# Patient Record
Sex: FEMALE | Race: BLACK OR AFRICAN AMERICAN | NOT HISPANIC OR LATINO | URBAN - METROPOLITAN AREA
[De-identification: names, ages, dates, MRNs, and addresses within clinical notes are randomized per-mention and may not be internally consistent; named-entity substitution may affect disease eponyms.]

---

## 2017-04-14 ENCOUNTER — OUTPATIENT (OUTPATIENT)
Dept: OUTPATIENT SERVICES | Facility: HOSPITAL | Age: 47
LOS: 1 days | End: 2017-04-14
Payer: COMMERCIAL

## 2017-04-14 ENCOUNTER — APPOINTMENT (OUTPATIENT)
Dept: ULTRASOUND IMAGING | Facility: IMAGING CENTER | Age: 47
End: 2017-04-14

## 2017-04-14 ENCOUNTER — APPOINTMENT (OUTPATIENT)
Dept: MAMMOGRAPHY | Facility: IMAGING CENTER | Age: 47
End: 2017-04-14

## 2017-04-14 DIAGNOSIS — Z00.8 ENCOUNTER FOR OTHER GENERAL EXAMINATION: ICD-10-CM

## 2017-04-14 DIAGNOSIS — Z33.2 ENCOUNTER FOR ELECTIVE TERMINATION OF PREGNANCY: Chronic | ICD-10-CM

## 2017-04-14 DIAGNOSIS — O03.9 COMPLETE OR UNSPECIFIED SPONTANEOUS ABORTION WITHOUT COMPLICATION: Chronic | ICD-10-CM

## 2017-04-14 DIAGNOSIS — D25.9 LEIOMYOMA OF UTERUS, UNSPECIFIED: Chronic | ICD-10-CM

## 2017-04-14 PROCEDURE — 76856 US EXAM PELVIC COMPLETE: CPT

## 2017-04-14 PROCEDURE — 76830 TRANSVAGINAL US NON-OB: CPT

## 2017-04-14 PROCEDURE — 77063 BREAST TOMOSYNTHESIS BI: CPT

## 2017-04-14 PROCEDURE — 77067 SCR MAMMO BI INCL CAD: CPT

## 2018-06-02 ENCOUNTER — APPOINTMENT (OUTPATIENT)
Dept: MAMMOGRAPHY | Facility: IMAGING CENTER | Age: 48
End: 2018-06-02

## 2018-07-12 ENCOUNTER — APPOINTMENT (OUTPATIENT)
Dept: MAMMOGRAPHY | Facility: IMAGING CENTER | Age: 48
End: 2018-07-12

## 2019-02-20 PROBLEM — Z00.00 ENCOUNTER FOR PREVENTIVE HEALTH EXAMINATION: Noted: 2019-02-20

## 2019-02-21 ENCOUNTER — OUTPATIENT (OUTPATIENT)
Dept: OUTPATIENT SERVICES | Facility: HOSPITAL | Age: 49
LOS: 1 days | End: 2019-02-21
Payer: COMMERCIAL

## 2019-02-21 ENCOUNTER — APPOINTMENT (OUTPATIENT)
Dept: MAMMOGRAPHY | Facility: IMAGING CENTER | Age: 49
End: 2019-02-21
Payer: COMMERCIAL

## 2019-02-21 DIAGNOSIS — Z00.00 ENCOUNTER FOR GENERAL ADULT MEDICAL EXAMINATION WITHOUT ABNORMAL FINDINGS: ICD-10-CM

## 2019-02-21 DIAGNOSIS — Z33.2 ENCOUNTER FOR ELECTIVE TERMINATION OF PREGNANCY: Chronic | ICD-10-CM

## 2019-02-21 DIAGNOSIS — D25.9 LEIOMYOMA OF UTERUS, UNSPECIFIED: Chronic | ICD-10-CM

## 2019-02-21 DIAGNOSIS — O03.9 COMPLETE OR UNSPECIFIED SPONTANEOUS ABORTION WITHOUT COMPLICATION: Chronic | ICD-10-CM

## 2019-02-21 PROCEDURE — 77067 SCR MAMMO BI INCL CAD: CPT | Mod: 26

## 2019-02-21 PROCEDURE — 77067 SCR MAMMO BI INCL CAD: CPT

## 2019-02-21 PROCEDURE — 77063 BREAST TOMOSYNTHESIS BI: CPT

## 2019-02-21 PROCEDURE — 77063 BREAST TOMOSYNTHESIS BI: CPT | Mod: 26

## 2019-03-06 ENCOUNTER — APPOINTMENT (OUTPATIENT)
Dept: ULTRASOUND IMAGING | Facility: IMAGING CENTER | Age: 49
End: 2019-03-06
Payer: COMMERCIAL

## 2019-03-06 ENCOUNTER — OUTPATIENT (OUTPATIENT)
Dept: OUTPATIENT SERVICES | Facility: HOSPITAL | Age: 49
LOS: 1 days | End: 2019-03-06
Payer: COMMERCIAL

## 2019-03-06 DIAGNOSIS — O03.9 COMPLETE OR UNSPECIFIED SPONTANEOUS ABORTION WITHOUT COMPLICATION: Chronic | ICD-10-CM

## 2019-03-06 DIAGNOSIS — Z00.8 ENCOUNTER FOR OTHER GENERAL EXAMINATION: ICD-10-CM

## 2019-03-06 DIAGNOSIS — Z33.2 ENCOUNTER FOR ELECTIVE TERMINATION OF PREGNANCY: Chronic | ICD-10-CM

## 2019-03-06 DIAGNOSIS — D25.9 LEIOMYOMA OF UTERUS, UNSPECIFIED: Chronic | ICD-10-CM

## 2019-03-06 PROCEDURE — 76775 US EXAM ABDO BACK WALL LIM: CPT | Mod: 26

## 2019-03-06 PROCEDURE — 76775 US EXAM ABDO BACK WALL LIM: CPT

## 2019-03-13 ENCOUNTER — APPOINTMENT (OUTPATIENT)
Dept: DERMATOLOGY | Facility: CLINIC | Age: 49
End: 2019-03-13
Payer: COMMERCIAL

## 2019-03-13 VITALS
DIASTOLIC BLOOD PRESSURE: 100 MMHG | HEIGHT: 64 IN | WEIGHT: 209 LBS | BODY MASS INDEX: 35.68 KG/M2 | SYSTOLIC BLOOD PRESSURE: 170 MMHG

## 2019-03-13 PROCEDURE — 99201 OFFICE OUTPATIENT NEW 10 MINUTES: CPT

## 2019-03-13 RX ORDER — AMLODIPINE BESYLATE 10 MG/1
10 TABLET ORAL
Qty: 90 | Refills: 0 | Status: ACTIVE | COMMUNITY
Start: 2018-09-27

## 2019-03-19 ENCOUNTER — APPOINTMENT (OUTPATIENT)
Dept: DERMATOLOGY | Facility: CLINIC | Age: 49
End: 2019-03-19
Payer: COMMERCIAL

## 2019-03-19 VITALS — SYSTOLIC BLOOD PRESSURE: 150 MMHG | DIASTOLIC BLOOD PRESSURE: 90 MMHG

## 2019-03-19 PROCEDURE — 99213 OFFICE O/P EST LOW 20 MIN: CPT

## 2019-05-21 ENCOUNTER — APPOINTMENT (OUTPATIENT)
Dept: DERMATOLOGY | Facility: CLINIC | Age: 49
End: 2019-05-21
Payer: COMMERCIAL

## 2019-05-21 PROCEDURE — 99214 OFFICE O/P EST MOD 30 MIN: CPT

## 2019-05-23 ENCOUNTER — LABORATORY RESULT (OUTPATIENT)
Age: 49
End: 2019-05-23

## 2019-05-23 ENCOUNTER — APPOINTMENT (OUTPATIENT)
Dept: DERMATOLOGY | Facility: CLINIC | Age: 49
End: 2019-05-23
Payer: COMMERCIAL

## 2019-05-23 DIAGNOSIS — L65.9 NONSCARRING HAIR LOSS, UNSPECIFIED: ICD-10-CM

## 2019-05-23 PROCEDURE — 11105 PUNCH BX SKIN EA SEP/ADDL: CPT

## 2019-05-23 PROCEDURE — 11104 PUNCH BX SKIN SINGLE LESION: CPT | Mod: 59

## 2019-05-23 PROCEDURE — 99213 OFFICE O/P EST LOW 20 MIN: CPT | Mod: 25

## 2019-06-03 ENCOUNTER — APPOINTMENT (OUTPATIENT)
Dept: DERMATOLOGY | Facility: CLINIC | Age: 49
End: 2019-06-03
Payer: COMMERCIAL

## 2019-06-03 PROCEDURE — 99212 OFFICE O/P EST SF 10 MIN: CPT

## 2019-06-27 ENCOUNTER — MEDICATION RENEWAL (OUTPATIENT)
Age: 49
End: 2019-06-27

## 2019-07-01 ENCOUNTER — APPOINTMENT (OUTPATIENT)
Dept: DERMATOLOGY | Facility: CLINIC | Age: 49
End: 2019-07-01
Payer: COMMERCIAL

## 2019-07-01 PROCEDURE — 99214 OFFICE O/P EST MOD 30 MIN: CPT

## 2019-07-29 LAB
ACE BLD-CCNC: 64 U/L
ALBUMIN SERPL ELPH-MCNC: 4 G/DL
ALP BLD-CCNC: 102 U/L
ALT SERPL-CCNC: 32 U/L
ANA PAT FLD IF-IMP: ABNORMAL
ANA SER IF-ACNC: ABNORMAL
ANION GAP SERPL CALC-SCNC: 11 MMOL/L
AST SERPL-CCNC: 31 U/L
BASOPHILS # BLD AUTO: 0.02 K/UL
BASOPHILS NFR BLD AUTO: 0.4 %
BILIRUB SERPL-MCNC: 0.2 MG/DL
BUN SERPL-MCNC: 27 MG/DL
CALCIUM SERPL-MCNC: 9.1 MG/DL
CHLORIDE SERPL-SCNC: 102 MMOL/L
CO2 SERPL-SCNC: 28 MMOL/L
CREAT SERPL-MCNC: 1.95 MG/DL
EOSINOPHIL # BLD AUTO: 0.25 K/UL
EOSINOPHIL NFR BLD AUTO: 5.2 %
GLUCOSE SERPL-MCNC: 129 MG/DL
HCT VFR BLD CALC: 37 %
HGB BLD-MCNC: 10.9 G/DL
IMM GRANULOCYTES NFR BLD AUTO: 0.2 %
LYMPHOCYTES # BLD AUTO: 1.23 K/UL
LYMPHOCYTES NFR BLD AUTO: 25.6 %
MAN DIFF?: NORMAL
MCHC RBC-ENTMCNC: 23.5 PG
MCHC RBC-ENTMCNC: 29.5 GM/DL
MCV RBC AUTO: 79.9 FL
MONOCYTES # BLD AUTO: 0.84 K/UL
MONOCYTES NFR BLD AUTO: 17.5 %
NEUTROPHILS # BLD AUTO: 2.45 K/UL
NEUTROPHILS NFR BLD AUTO: 51.1 %
PLATELET # BLD AUTO: 334 K/UL
POTASSIUM SERPL-SCNC: 3.7 MMOL/L
PROT SERPL-MCNC: 6.8 G/DL
RBC # BLD: 4.63 M/UL
RBC # FLD: 15.3 %
SODIUM SERPL-SCNC: 141 MMOL/L
WBC # FLD AUTO: 4.8 K/UL

## 2019-12-20 ENCOUNTER — RESULT REVIEW (OUTPATIENT)
Age: 49
End: 2019-12-20

## 2020-01-31 ENCOUNTER — APPOINTMENT (OUTPATIENT)
Dept: DERMATOLOGY | Facility: CLINIC | Age: 50
End: 2020-01-31
Payer: COMMERCIAL

## 2020-01-31 DIAGNOSIS — L30.4 ERYTHEMA INTERTRIGO: ICD-10-CM

## 2020-01-31 PROCEDURE — 99213 OFFICE O/P EST LOW 20 MIN: CPT

## 2020-02-05 LAB
M TB IFN-G BLD-IMP: NEGATIVE
QUANTIFERON TB PLUS MITOGEN MINUS NIL: 4.17 IU/ML
QUANTIFERON TB PLUS NIL: 0.08 IU/ML
QUANTIFERON TB PLUS TB1 MINUS NIL: -0.01 IU/ML
QUANTIFERON TB PLUS TB2 MINUS NIL: -0.02 IU/ML

## 2020-02-11 ENCOUNTER — APPOINTMENT (OUTPATIENT)
Dept: ULTRASOUND IMAGING | Facility: IMAGING CENTER | Age: 50
End: 2020-02-11
Payer: COMMERCIAL

## 2020-02-11 ENCOUNTER — OUTPATIENT (OUTPATIENT)
Dept: OUTPATIENT SERVICES | Facility: HOSPITAL | Age: 50
LOS: 1 days | End: 2020-02-11
Payer: COMMERCIAL

## 2020-02-11 DIAGNOSIS — Z33.2 ENCOUNTER FOR ELECTIVE TERMINATION OF PREGNANCY: Chronic | ICD-10-CM

## 2020-02-11 DIAGNOSIS — O03.9 COMPLETE OR UNSPECIFIED SPONTANEOUS ABORTION WITHOUT COMPLICATION: Chronic | ICD-10-CM

## 2020-02-11 DIAGNOSIS — D25.9 LEIOMYOMA OF UTERUS, UNSPECIFIED: Chronic | ICD-10-CM

## 2020-02-11 DIAGNOSIS — Z00.8 ENCOUNTER FOR OTHER GENERAL EXAMINATION: ICD-10-CM

## 2020-02-11 PROCEDURE — 76856 US EXAM PELVIC COMPLETE: CPT | Mod: 26

## 2020-02-11 PROCEDURE — 76830 TRANSVAGINAL US NON-OB: CPT

## 2020-02-11 PROCEDURE — 76856 US EXAM PELVIC COMPLETE: CPT

## 2020-02-11 PROCEDURE — 76830 TRANSVAGINAL US NON-OB: CPT | Mod: 26

## 2020-02-18 ENCOUNTER — APPOINTMENT (OUTPATIENT)
Dept: ULTRASOUND IMAGING | Facility: IMAGING CENTER | Age: 50
End: 2020-02-18
Payer: COMMERCIAL

## 2020-02-18 ENCOUNTER — APPOINTMENT (OUTPATIENT)
Dept: RADIOLOGY | Facility: IMAGING CENTER | Age: 50
End: 2020-02-18
Payer: COMMERCIAL

## 2020-02-18 ENCOUNTER — OUTPATIENT (OUTPATIENT)
Dept: OUTPATIENT SERVICES | Facility: HOSPITAL | Age: 50
LOS: 1 days | End: 2020-02-18
Payer: COMMERCIAL

## 2020-02-18 DIAGNOSIS — Z00.8 ENCOUNTER FOR OTHER GENERAL EXAMINATION: ICD-10-CM

## 2020-02-18 DIAGNOSIS — Z33.2 ENCOUNTER FOR ELECTIVE TERMINATION OF PREGNANCY: Chronic | ICD-10-CM

## 2020-02-18 DIAGNOSIS — D25.9 LEIOMYOMA OF UTERUS, UNSPECIFIED: Chronic | ICD-10-CM

## 2020-02-18 DIAGNOSIS — O03.9 COMPLETE OR UNSPECIFIED SPONTANEOUS ABORTION WITHOUT COMPLICATION: Chronic | ICD-10-CM

## 2020-02-18 PROCEDURE — 73564 X-RAY EXAM KNEE 4 OR MORE: CPT | Mod: 26,50

## 2020-02-18 PROCEDURE — 93970 EXTREMITY STUDY: CPT

## 2020-02-18 PROCEDURE — 73564 X-RAY EXAM KNEE 4 OR MORE: CPT

## 2020-02-18 PROCEDURE — 93970 EXTREMITY STUDY: CPT | Mod: 26

## 2020-02-24 ENCOUNTER — APPOINTMENT (OUTPATIENT)
Dept: ULTRASOUND IMAGING | Facility: IMAGING CENTER | Age: 50
End: 2020-02-24
Payer: COMMERCIAL

## 2020-02-24 ENCOUNTER — APPOINTMENT (OUTPATIENT)
Dept: MAMMOGRAPHY | Facility: IMAGING CENTER | Age: 50
End: 2020-02-24
Payer: COMMERCIAL

## 2020-02-24 ENCOUNTER — OUTPATIENT (OUTPATIENT)
Dept: OUTPATIENT SERVICES | Facility: HOSPITAL | Age: 50
LOS: 1 days | End: 2020-02-24
Payer: COMMERCIAL

## 2020-02-24 DIAGNOSIS — O03.9 COMPLETE OR UNSPECIFIED SPONTANEOUS ABORTION WITHOUT COMPLICATION: Chronic | ICD-10-CM

## 2020-02-24 DIAGNOSIS — Z33.2 ENCOUNTER FOR ELECTIVE TERMINATION OF PREGNANCY: Chronic | ICD-10-CM

## 2020-02-24 DIAGNOSIS — D25.9 LEIOMYOMA OF UTERUS, UNSPECIFIED: Chronic | ICD-10-CM

## 2020-02-24 DIAGNOSIS — Z00.8 ENCOUNTER FOR OTHER GENERAL EXAMINATION: ICD-10-CM

## 2020-02-24 PROCEDURE — 77063 BREAST TOMOSYNTHESIS BI: CPT

## 2020-02-24 PROCEDURE — 77067 SCR MAMMO BI INCL CAD: CPT | Mod: 26

## 2020-02-24 PROCEDURE — 77063 BREAST TOMOSYNTHESIS BI: CPT | Mod: 26

## 2020-02-24 PROCEDURE — 93975 VASCULAR STUDY: CPT

## 2020-02-24 PROCEDURE — 93975 VASCULAR STUDY: CPT | Mod: 26

## 2020-02-24 PROCEDURE — 77067 SCR MAMMO BI INCL CAD: CPT

## 2020-03-11 ENCOUNTER — NON-APPOINTMENT (OUTPATIENT)
Age: 50
End: 2020-03-11

## 2020-03-11 ENCOUNTER — APPOINTMENT (OUTPATIENT)
Dept: CARDIOLOGY | Facility: CLINIC | Age: 50
End: 2020-03-11
Payer: COMMERCIAL

## 2020-03-11 VITALS — DIASTOLIC BLOOD PRESSURE: 90 MMHG | SYSTOLIC BLOOD PRESSURE: 160 MMHG

## 2020-03-11 VITALS
BODY MASS INDEX: 36.54 KG/M2 | WEIGHT: 214 LBS | SYSTOLIC BLOOD PRESSURE: 169 MMHG | DIASTOLIC BLOOD PRESSURE: 95 MMHG | HEART RATE: 91 BPM | HEIGHT: 64 IN | OXYGEN SATURATION: 97 %

## 2020-03-11 DIAGNOSIS — R79.89 OTHER SPECIFIED ABNORMAL FINDINGS OF BLOOD CHEMISTRY: ICD-10-CM

## 2020-03-11 DIAGNOSIS — Z87.59 PERSONAL HISTORY OF OTHER COMPLICATIONS OF PREGNANCY, CHILDBIRTH AND THE PUERPERIUM: ICD-10-CM

## 2020-03-11 DIAGNOSIS — Z82.49 FAMILY HISTORY OF ISCHEMIC HEART DISEASE AND OTHER DISEASES OF THE CIRCULATORY SYSTEM: ICD-10-CM

## 2020-03-11 PROCEDURE — 99205 OFFICE O/P NEW HI 60 MIN: CPT

## 2020-03-11 PROCEDURE — 93000 ELECTROCARDIOGRAM COMPLETE: CPT

## 2020-03-11 RX ORDER — CLOBETASOL PROPIONATE 0.5 MG/G
0.05 CREAM TOPICAL
Qty: 30 | Refills: 0 | Status: DISCONTINUED | COMMUNITY
Start: 2019-01-04 | End: 2020-03-11

## 2020-03-11 RX ORDER — FOLIC ACID 1 MG/1
1 TABLET ORAL DAILY
Qty: 90 | Refills: 2 | Status: ACTIVE | COMMUNITY
Start: 2020-03-11

## 2020-03-11 RX ORDER — DOXYCYCLINE 100 MG/1
100 CAPSULE ORAL
Qty: 20 | Refills: 0 | Status: DISCONTINUED | COMMUNITY
Start: 2019-03-13 | End: 2020-03-11

## 2020-03-11 RX ORDER — HALOBETASOL PROPIONATE 0.5 MG/G
0.05 OINTMENT TOPICAL
Qty: 1 | Refills: 0 | Status: DISCONTINUED | COMMUNITY
Start: 2019-05-21 | End: 2020-03-11

## 2020-03-11 RX ORDER — DOXYCYCLINE 100 MG/1
100 CAPSULE ORAL TWICE DAILY
Qty: 60 | Refills: 3 | Status: DISCONTINUED | COMMUNITY
Start: 2019-07-01 | End: 2020-03-11

## 2020-03-11 RX ORDER — ASPIRIN 81 MG/1
81 TABLET ORAL DAILY
Qty: 90 | Refills: 3 | Status: ACTIVE | COMMUNITY
Start: 2020-03-11

## 2020-03-11 RX ORDER — CLOTRIMAZOLE AND BETAMETHASONE DIPROPIONATE 10; .5 MG/G; MG/G
1-0.05 CREAM TOPICAL
Qty: 30 | Refills: 0 | Status: DISCONTINUED | COMMUNITY
Start: 2018-09-27 | End: 2020-03-11

## 2020-03-11 RX ORDER — HYDROCORTISONE 25 MG/G
2.5 CREAM TOPICAL
Qty: 1 | Refills: 1 | Status: DISCONTINUED | COMMUNITY
Start: 2019-05-21 | End: 2020-03-11

## 2020-03-11 RX ORDER — LIDOCAINE HYDROCHLORIDE 20 MG/ML
2 JELLY TOPICAL
Qty: 30 | Refills: 0 | Status: DISCONTINUED | COMMUNITY
Start: 2018-09-29 | End: 2020-03-11

## 2020-03-11 RX ORDER — TRETINOIN 0.25 MG/G
0.03 CREAM TOPICAL
Qty: 1 | Refills: 2 | Status: DISCONTINUED | COMMUNITY
Start: 2019-07-02 | End: 2020-03-11

## 2020-03-11 RX ORDER — KETOCONAZOLE 20 MG/G
2 CREAM TOPICAL
Qty: 1 | Refills: 0 | Status: DISCONTINUED | COMMUNITY
Start: 2019-05-21 | End: 2020-03-11

## 2020-03-11 RX ORDER — LOSARTAN POTASSIUM AND HYDROCHLOROTHIAZIDE 12.5; 5 MG/1; MG/1
50-12.5 TABLET ORAL
Qty: 30 | Refills: 0 | Status: DISCONTINUED | COMMUNITY
Start: 2018-10-08 | End: 2020-03-11

## 2020-03-11 RX ORDER — TACROLIMUS 1 MG/G
0.1 OINTMENT TOPICAL TWICE DAILY
Qty: 1 | Refills: 1 | Status: DISCONTINUED | COMMUNITY
Start: 2020-01-31 | End: 2020-03-11

## 2020-03-29 NOTE — PHYSICAL EXAM
[General Appearance - Well Developed] : well developed [Normal Appearance] : normal appearance [Well Groomed] : well groomed [General Appearance - Well Nourished] : well nourished [No Deformities] : no deformities [General Appearance - In No Acute Distress] : no acute distress [Normal Conjunctiva] : the conjunctiva exhibited no abnormalities [Eyelids - No Xanthelasma] : the eyelids demonstrated no xanthelasmas [Normal Oral Mucosa] : normal oral mucosa [No Oral Pallor] : no oral pallor [No Oral Cyanosis] : no oral cyanosis [Normal Oropharynx] : normal oropharynx [Normal Jugular Venous A Waves Present] : normal jugular venous A waves present [Normal Jugular Venous V Waves Present] : normal jugular venous V waves present [No Jugular Venous Gaines A Waves] : no jugular venous gaines A waves [Heart Rate And Rhythm] : heart rate and rhythm were normal [Heart Sounds] : normal S1 and S2 [Murmurs] : no murmurs present [Arterial Pulses Normal] : the arterial pulses were normal [Edema] : no peripheral edema present [Veins - Varicosity Changes] : no varicosital changes were noted in the lower extremities [] : no respiratory distress [Respiration, Rhythm And Depth] : normal respiratory rhythm and effort [Exaggerated Use Of Accessory Muscles For Inspiration] : no accessory muscle use [Auscultation Breath Sounds / Voice Sounds] : lungs were clear to auscultation bilaterally [Bowel Sounds] : normal bowel sounds [Abdomen Soft] : soft [Abdomen Tenderness] : non-tender [Abnormal Walk] : normal gait [Gait - Sufficient For Exercise Testing] : the gait was sufficient for exercise testing [Nail Clubbing] : no clubbing of the fingernails [Cyanosis, Localized] : no localized cyanosis [Skin Color & Pigmentation] : normal skin color and pigmentation [No Venous Stasis] : no venous stasis [No Xanthoma] : no  xanthoma was observed [Oriented To Time, Place, And Person] : oriented to person, place, and time [Impaired Insight] : insight and judgment were intact [Affect] : the affect was normal [Mood] : the mood was normal [No Anxiety] : not feeling anxious

## 2020-03-29 NOTE — DISCUSSION/SUMMARY
[FreeTextEntry1] : Patient is a 49 year-old woman with recent CVA in the setting of hypertensive crisis. Blood pressure remains elevated at today's visit. Will titrate up antihypertensive regimen. \par Currently at maximally tolerated doses of losartan, amlodipine, HCTZ, and doxazosin. Will increase labetalol.\par Diet and exercise with goal of adding lean muscle and reducing fat to address insulin resistant state. Repeat A1c in 3 months.\par Weight loss will also likely improve blood pressure control.\par \par Plan for repeat MRI with Dr. Bro tomorrow.\par Patient will attempt to obtain echocardiogram results from [facility].

## 2020-03-29 NOTE — HISTORY OF PRESENT ILLNESS
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 She reports that this resolved with medications but without intervention.\par \par Dermatologic biopsy with concern for sarcoidosis in 2020. No work-up has been done to evaluate heart or lungs for sarcoidosis, but patient has no history of syncope, palpitations, or conduction disease on ECG.\par \par PMD: Boaz Galvez MD [phone]\par Dermatologist: \par Gyn: Crys Fiore MD [phone]\par Hematologist: Socorro Trujillo MD [phone]

## 2020-03-29 NOTE — REASON FOR VISIT
[Initial Evaluation] : an initial evaluation of [Hypertension] : hypertension [FreeTextEntry1] : Patient referred here by her neurologist, Silvana Bro MD.

## 2020-07-27 ENCOUNTER — APPOINTMENT (OUTPATIENT)
Dept: CARDIOLOGY | Facility: CLINIC | Age: 50
End: 2020-07-27
Payer: COMMERCIAL

## 2020-07-27 ENCOUNTER — NON-APPOINTMENT (OUTPATIENT)
Age: 50
End: 2020-07-27

## 2020-07-27 VITALS
DIASTOLIC BLOOD PRESSURE: 86 MMHG | WEIGHT: 198 LBS | TEMPERATURE: 97.4 F | HEIGHT: 60 IN | OXYGEN SATURATION: 100 % | BODY MASS INDEX: 38.87 KG/M2 | SYSTOLIC BLOOD PRESSURE: 136 MMHG | RESPIRATION RATE: 17 BRPM | HEART RATE: 78 BPM

## 2020-07-27 PROCEDURE — 99215 OFFICE O/P EST HI 40 MIN: CPT

## 2020-07-27 PROCEDURE — 93000 ELECTROCARDIOGRAM COMPLETE: CPT

## 2020-07-27 NOTE — HISTORY OF PRESENT ILLNESS
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 She reports that this resolved with medications but without intervention.\par \par Dermatologic biopsy with concern for sarcoidosis in 2020. No work-up has been done to evaluate heart or lungs for sarcoidosis, but patient has no history of syncope, palpitations, or conduction disease on ECG.\par \par PMD: Boaz Galvez MD [phone]\par Dermatologist: \par Gyn: Crys Fiore MD [phone]\par Hematologist: Socorro Trujillo MD [phone]\par Nephrologist: Khai Alaniz MD [phone]

## 2020-07-27 NOTE — REASON FOR VISIT
[Initial Evaluation] : an initial evaluation of [Hypertension] : hypertension [FreeTextEntry1] : Patient referred here by her neurologist, Silvana Bro MD.\par \par July 2020 - On July 15, 2020, patient was at work and noted right arm and right leg weakness. She dragged herself to the ER. Stroke protocol was called. By the time of the CT scan, her symptoms had entirely resolved. She was discharged the same day.

## 2020-07-27 NOTE — DISCUSSION/SUMMARY
[FreeTextEntry1] : Patient is a 49 year-old woman with recent CVA in the setting of hypertensive crisis. Blood pressure is better controlled at today's visit. Will continue antihypertensive regimen. \par Currently at maximally tolerated doses of losartan, amlodipine, HCTZ, and doxazosin. \par Will continue labetalol at 400 mg BID.\par \par Diet and exercise with goal of adding lean muscle and reducing fat to address insulin resistant state. Repeat A1c in 3 months.\par Weight loss will also likely improve blood pressure control.\par \par In light of recent cryptogenic CVA, will check RITESH (patient provided with number to call [facility], per her preference), Holter, and MCOT to evaluate for PAF.

## 2020-07-27 NOTE — PHYSICAL EXAM
[General Appearance - Well Developed] : well developed [Normal Appearance] : normal appearance [Well Groomed] : well groomed [General Appearance - Well Nourished] : well nourished [No Deformities] : no deformities [General Appearance - In No Acute Distress] : no acute distress [Normal Oral Mucosa] : normal oral mucosa [Normal Conjunctiva] : the conjunctiva exhibited no abnormalities [Eyelids - No Xanthelasma] : the eyelids demonstrated no xanthelasmas [No Oral Cyanosis] : no oral cyanosis [No Oral Pallor] : no oral pallor [Normal Oropharynx] : normal oropharynx [Normal Jugular Venous A Waves Present] : normal jugular venous A waves present [Normal Jugular Venous V Waves Present] : normal jugular venous V waves present [No Jugular Venous Gaines A Waves] : no jugular venous gaines A waves [] : no respiratory distress [Exaggerated Use Of Accessory Muscles For Inspiration] : no accessory muscle use [Respiration, Rhythm And Depth] : normal respiratory rhythm and effort [Auscultation Breath Sounds / Voice Sounds] : lungs were clear to auscultation bilaterally [Heart Sounds] : normal S1 and S2 [Murmurs] : no murmurs present [Heart Rate And Rhythm] : heart rate and rhythm were normal [Arterial Pulses Normal] : the arterial pulses were normal [Edema] : no peripheral edema present [Abdomen Soft] : soft [Veins - Varicosity Changes] : no varicosital changes were noted in the lower extremities [Bowel Sounds] : normal bowel sounds [Gait - Sufficient For Exercise Testing] : the gait was sufficient for exercise testing [Abnormal Walk] : normal gait [Abdomen Tenderness] : non-tender [Cyanosis, Localized] : no localized cyanosis [Nail Clubbing] : no clubbing of the fingernails [Skin Color & Pigmentation] : normal skin color and pigmentation [No Venous Stasis] : no venous stasis [No Xanthoma] : no  xanthoma was observed [Oriented To Time, Place, And Person] : oriented to person, place, and time [Impaired Insight] : insight and judgment were intact [Mood] : the mood was normal [Affect] : the affect was normal [No Anxiety] : not feeling anxious

## 2020-07-29 LAB
25(OH)D3 SERPL-MCNC: 10.3 NG/ML
ALBUMIN SERPL ELPH-MCNC: 4.3 G/DL
ALP BLD-CCNC: 187 U/L
ALT SERPL-CCNC: 36 U/L
ANION GAP SERPL CALC-SCNC: 16 MMOL/L
AST SERPL-CCNC: 37 U/L
BASOPHILS # BLD AUTO: 0.02 K/UL
BASOPHILS NFR BLD AUTO: 0.3 %
BILIRUB SERPL-MCNC: 0.2 MG/DL
BUN SERPL-MCNC: 44 MG/DL
CALCIUM SERPL-MCNC: 10.5 MG/DL
CHLORIDE SERPL-SCNC: 99 MMOL/L
CHOLEST SERPL-MCNC: 143 MG/DL
CHOLEST/HDLC SERPL: 2.9 RATIO
CO2 SERPL-SCNC: 25 MMOL/L
CREAT SERPL-MCNC: 3.31 MG/DL
EOSINOPHIL # BLD AUTO: 0.29 K/UL
EOSINOPHIL NFR BLD AUTO: 4.9 %
ESTIMATED AVERAGE GLUCOSE: 235 MG/DL
GLUCOSE SERPL-MCNC: 122 MG/DL
HBA1C MFR BLD HPLC: 9.8 %
HCT VFR BLD CALC: 36.6 %
HDLC SERPL-MCNC: 49 MG/DL
HGB BLD-MCNC: 11.1 G/DL
IMM GRANULOCYTES NFR BLD AUTO: 0.3 %
LDLC SERPL CALC-MCNC: 68 MG/DL
LYMPHOCYTES # BLD AUTO: 1.25 K/UL
LYMPHOCYTES NFR BLD AUTO: 21.1 %
MAN DIFF?: NORMAL
MCHC RBC-ENTMCNC: 28 PG
MCHC RBC-ENTMCNC: 30.3 GM/DL
MCV RBC AUTO: 92.4 FL
MONOCYTES # BLD AUTO: 0.78 K/UL
MONOCYTES NFR BLD AUTO: 13.2 %
NEUTROPHILS # BLD AUTO: 3.56 K/UL
NEUTROPHILS NFR BLD AUTO: 60.2 %
PLATELET # BLD AUTO: 339 K/UL
POTASSIUM SERPL-SCNC: 4.1 MMOL/L
PROT SERPL-MCNC: 7.2 G/DL
RBC # BLD: 3.96 M/UL
RBC # FLD: 12.9 %
SARS-COV-2 IGG SERPL IA-ACNC: 0.72 INDEX
SARS-COV-2 IGG SERPL QL IA: NEGATIVE
SODIUM SERPL-SCNC: 139 MMOL/L
TRIGL SERPL-MCNC: 131 MG/DL
TSH SERPL-ACNC: 1.5 UIU/ML
WBC # FLD AUTO: 5.92 K/UL

## 2020-08-03 ENCOUNTER — NON-APPOINTMENT (OUTPATIENT)
Age: 50
End: 2020-08-03

## 2020-08-12 ENCOUNTER — APPOINTMENT (OUTPATIENT)
Dept: CARDIOLOGY | Facility: CLINIC | Age: 50
End: 2020-08-12

## 2020-08-13 ENCOUNTER — NON-APPOINTMENT (OUTPATIENT)
Age: 50
End: 2020-08-13

## 2020-11-19 ENCOUNTER — NON-APPOINTMENT (OUTPATIENT)
Age: 50
End: 2020-11-19

## 2020-11-19 ENCOUNTER — APPOINTMENT (OUTPATIENT)
Dept: CARDIOLOGY | Facility: CLINIC | Age: 50
End: 2020-11-19
Payer: COMMERCIAL

## 2020-11-19 VITALS — DIASTOLIC BLOOD PRESSURE: 80 MMHG | SYSTOLIC BLOOD PRESSURE: 140 MMHG

## 2020-11-19 VITALS
WEIGHT: 194 LBS | SYSTOLIC BLOOD PRESSURE: 152 MMHG | OXYGEN SATURATION: 100 % | TEMPERATURE: 97.3 F | BODY MASS INDEX: 37.89 KG/M2 | HEART RATE: 85 BPM | DIASTOLIC BLOOD PRESSURE: 88 MMHG

## 2020-11-19 PROCEDURE — 99072 ADDL SUPL MATRL&STAF TM PHE: CPT

## 2020-11-19 PROCEDURE — 93000 ELECTROCARDIOGRAM COMPLETE: CPT

## 2020-11-19 PROCEDURE — 99215 OFFICE O/P EST HI 40 MIN: CPT

## 2020-11-19 NOTE — PHYSICAL EXAM
[General Appearance - Well Developed] : well developed [Normal Appearance] : normal appearance [Well Groomed] : well groomed [General Appearance - Well Nourished] : well nourished [No Deformities] : no deformities [General Appearance - In No Acute Distress] : no acute distress [Normal Conjunctiva] : the conjunctiva exhibited no abnormalities [Eyelids - No Xanthelasma] : the eyelids demonstrated no xanthelasmas [Normal Oral Mucosa] : normal oral mucosa [No Oral Pallor] : no oral pallor [No Oral Cyanosis] : no oral cyanosis [Normal Oropharynx] : normal oropharynx [Normal Jugular Venous A Waves Present] : normal jugular venous A waves present [Normal Jugular Venous V Waves Present] : normal jugular venous V waves present [No Jugular Venous Gaines A Waves] : no jugular venous gaines A waves [] : no respiratory distress [Respiration, Rhythm And Depth] : normal respiratory rhythm and effort [Exaggerated Use Of Accessory Muscles For Inspiration] : no accessory muscle use [Auscultation Breath Sounds / Voice Sounds] : lungs were clear to auscultation bilaterally [Heart Rate And Rhythm] : heart rate and rhythm were normal [Heart Sounds] : normal S1 and S2 [Murmurs] : no murmurs present [Arterial Pulses Normal] : the arterial pulses were normal [Edema] : no peripheral edema present [Veins - Varicosity Changes] : no varicosital changes were noted in the lower extremities [Bowel Sounds] : normal bowel sounds [Abdomen Soft] : soft [Abdomen Tenderness] : non-tender [Abnormal Walk] : normal gait [Gait - Sufficient For Exercise Testing] : the gait was sufficient for exercise testing [Nail Clubbing] : no clubbing of the fingernails [Cyanosis, Localized] : no localized cyanosis [Skin Color & Pigmentation] : normal skin color and pigmentation [No Venous Stasis] : no venous stasis [No Xanthoma] : no  xanthoma was observed [Oriented To Time, Place, And Person] : oriented to person, place, and time [Impaired Insight] : insight and judgment were intact [Affect] : the affect was normal [Mood] : the mood was normal [No Anxiety] : not feeling anxious

## 2020-11-20 LAB
25(OH)D3 SERPL-MCNC: 35.9 NG/ML
ALBUMIN SERPL ELPH-MCNC: 4.2 G/DL
ALP BLD-CCNC: 159 U/L
ALT SERPL-CCNC: 23 U/L
ANION GAP SERPL CALC-SCNC: 11 MMOL/L
AST SERPL-CCNC: 25 U/L
BASOPHILS # BLD AUTO: 0.03 K/UL
BASOPHILS NFR BLD AUTO: 0.4 %
BILIRUB SERPL-MCNC: <0.2 MG/DL
BUN SERPL-MCNC: 46 MG/DL
CALCIUM SERPL-MCNC: 11 MG/DL
CHLORIDE SERPL-SCNC: 99 MMOL/L
CHOLEST SERPL-MCNC: 109 MG/DL
CO2 SERPL-SCNC: 28 MMOL/L
CREAT SERPL-MCNC: 3.73 MG/DL
EOSINOPHIL # BLD AUTO: 0.42 K/UL
EOSINOPHIL NFR BLD AUTO: 6.1 %
ESTIMATED AVERAGE GLUCOSE: 151 MG/DL
GLUCOSE SERPL-MCNC: 118 MG/DL
HBA1C MFR BLD HPLC: 6.9 %
HCT VFR BLD CALC: 35.3 %
HDLC SERPL-MCNC: 45 MG/DL
HGB BLD-MCNC: 10.6 G/DL
IMM GRANULOCYTES NFR BLD AUTO: 0.3 %
LDLC SERPL CALC-MCNC: 32 MG/DL
LDLC SERPL DIRECT ASSAY-MCNC: 41 MG/DL
LYMPHOCYTES # BLD AUTO: 1.45 K/UL
LYMPHOCYTES NFR BLD AUTO: 21.1 %
MAN DIFF?: NORMAL
MCHC RBC-ENTMCNC: 26.6 PG
MCHC RBC-ENTMCNC: 30 GM/DL
MCV RBC AUTO: 88.5 FL
MONOCYTES # BLD AUTO: 0.98 K/UL
MONOCYTES NFR BLD AUTO: 14.2 %
NEUTROPHILS # BLD AUTO: 3.98 K/UL
NEUTROPHILS NFR BLD AUTO: 57.9 %
NONHDLC SERPL-MCNC: 65 MG/DL
PLATELET # BLD AUTO: 292 K/UL
POTASSIUM SERPL-SCNC: 4 MMOL/L
PROT SERPL-MCNC: 7 G/DL
RBC # BLD: 3.99 M/UL
RBC # FLD: 14.1 %
SARS-COV-2 IGG SERPL IA-ACNC: 0.09 INDEX
SARS-COV-2 IGG SERPL QL IA: NEGATIVE
SODIUM SERPL-SCNC: 137 MMOL/L
TRIGL SERPL-MCNC: 165 MG/DL
TSH SERPL-ACNC: 2.03 UIU/ML
WBC # FLD AUTO: 6.88 K/UL

## 2020-12-01 NOTE — DISCUSSION/SUMMARY
[FreeTextEntry1] : Patient is a 50 year-old woman with recent CVA in the setting of hypertensive crisis. Blood pressure is better controlled at today's visit. Will continue antihypertensive regimen. \par Currently at maximally tolerated doses of losartan, amlodipine, HCTZ, and doxazosin. \par Will continue labetalol at 400 mg BID.\par Creatinine has continued to rise - followed by nephrology.\par \par Diet and exercise with goal of adding lean muscle and reducing fat to address insulin resistant state. Repeat A1c in 3 months.\par Weight loss will also likely improve blood pressure control.\par \par Holter and 6 week monitor were negative for paroxysmal atrial fibrillation. In light of recent cryptogenic CVA, patient encouraged to have RITESH (patient provided with number to call to schedule at [facility], per her preference) to evaluate for PFO and cardiac source of emboli.

## 2020-12-01 NOTE — HISTORY OF PRESENT ILLNESS
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 She reports that this resolved with medications but without intervention.\par \par Dermatologic biopsy with concern for sarcoidosis in 2020. No work-up has been done to evaluate heart or lungs for sarcoidosis, but patient has no history of syncope, palpitations, or conduction disease on ECG.\par \par 2020 - On July 15, 2020, patient was at work and noted right arm and right leg weakness. She dragged herself to the ER. Stroke protocol was called. By the time of the CT scan, her symptoms had entirely resolved. She was discharged the same day. \par \par PMD: Boaz Galvez MD [phone]\par Dermatologist: \par Gyn: Crys Fiore MD [phone]\par Hematologist: Socorro Trujillo MD [phone]\par Nephrologist: Khai Alaniz MD [phone]

## 2020-12-01 NOTE — REASON FOR VISIT
[Follow-Up - Clinic] : a clinic follow-up of [Hypertension] : hypertension [FreeTextEntry1] : Patient referred here by her neurologist, Silvana Bro MD.\par \par November 2020 - Patient returns today in her usual state of health. She has not had recurrence of neurologic symptoms. She has also not called [facility] to schedule her RITESH because she is nervous about the procedure. In the interim since her last visit, her creatinine has risen and she was told to schedule a renal biopsy. She has not yet scheduled this procedure either.\par

## 2020-12-16 ENCOUNTER — APPOINTMENT (OUTPATIENT)
Dept: CARDIOLOGY | Facility: CLINIC | Age: 50
End: 2020-12-16

## 2021-02-17 ENCOUNTER — RESULT REVIEW (OUTPATIENT)
Age: 51
End: 2021-02-17

## 2021-03-08 ENCOUNTER — APPOINTMENT (OUTPATIENT)
Dept: ULTRASOUND IMAGING | Facility: IMAGING CENTER | Age: 51
End: 2021-03-08

## 2021-03-31 ENCOUNTER — OUTPATIENT (OUTPATIENT)
Dept: OUTPATIENT SERVICES | Facility: HOSPITAL | Age: 51
LOS: 1 days | End: 2021-03-31
Payer: COMMERCIAL

## 2021-03-31 ENCOUNTER — APPOINTMENT (OUTPATIENT)
Dept: MAMMOGRAPHY | Facility: IMAGING CENTER | Age: 51
End: 2021-03-31
Payer: COMMERCIAL

## 2021-03-31 DIAGNOSIS — D25.9 LEIOMYOMA OF UTERUS, UNSPECIFIED: Chronic | ICD-10-CM

## 2021-03-31 DIAGNOSIS — O03.9 COMPLETE OR UNSPECIFIED SPONTANEOUS ABORTION WITHOUT COMPLICATION: Chronic | ICD-10-CM

## 2021-03-31 DIAGNOSIS — Z33.2 ENCOUNTER FOR ELECTIVE TERMINATION OF PREGNANCY: Chronic | ICD-10-CM

## 2021-03-31 DIAGNOSIS — Z00.8 ENCOUNTER FOR OTHER GENERAL EXAMINATION: ICD-10-CM

## 2021-03-31 PROCEDURE — 77067 SCR MAMMO BI INCL CAD: CPT

## 2021-03-31 PROCEDURE — 77063 BREAST TOMOSYNTHESIS BI: CPT

## 2021-03-31 PROCEDURE — 77063 BREAST TOMOSYNTHESIS BI: CPT | Mod: 26

## 2021-03-31 PROCEDURE — 77067 SCR MAMMO BI INCL CAD: CPT | Mod: 26

## 2021-05-07 ENCOUNTER — EMERGENCY (EMERGENCY)
Facility: HOSPITAL | Age: 51
LOS: 1 days | Discharge: HOME | End: 2021-05-07
Attending: EMERGENCY MEDICINE | Admitting: EMERGENCY MEDICINE
Payer: COMMERCIAL

## 2021-05-07 VITALS
RESPIRATION RATE: 16 BRPM | TEMPERATURE: 98 F | HEIGHT: 64 IN | HEART RATE: 92 BPM | SYSTOLIC BLOOD PRESSURE: 163 MMHG | DIASTOLIC BLOOD PRESSURE: 85 MMHG | OXYGEN SATURATION: 100 %

## 2021-05-07 DIAGNOSIS — D25.9 LEIOMYOMA OF UTERUS, UNSPECIFIED: Chronic | ICD-10-CM

## 2021-05-07 DIAGNOSIS — Z33.2 ENCOUNTER FOR ELECTIVE TERMINATION OF PREGNANCY: Chronic | ICD-10-CM

## 2021-05-07 DIAGNOSIS — O03.9 COMPLETE OR UNSPECIFIED SPONTANEOUS ABORTION WITHOUT COMPLICATION: Chronic | ICD-10-CM

## 2021-05-07 PROCEDURE — 99285 EMERGENCY DEPT VISIT HI MDM: CPT

## 2021-05-07 NOTE — ED ADULT TRIAGE NOTE - CHIEF COMPLAINT QUOTE
c/o vaginal bleeding x 2 weeks. passing large clots (larger than fist). has been having generalized weakness. denies any pain. denies dizziness, sob. takes daily aspirin. hx fibroids, stroke 1.5 years ago, HTN.

## 2021-05-08 VITALS — HEIGHT: 64 IN | WEIGHT: 197.98 LBS

## 2021-05-08 LAB
ALBUMIN SERPL ELPH-MCNC: 3.8 G/DL — SIGNIFICANT CHANGE UP (ref 3.3–5)
ALP SERPL-CCNC: 138 U/L — HIGH (ref 40–120)
ALT FLD-CCNC: 14 U/L — SIGNIFICANT CHANGE UP (ref 4–33)
ANION GAP SERPL CALC-SCNC: 11 MMOL/L — SIGNIFICANT CHANGE UP (ref 7–14)
AST SERPL-CCNC: 15 U/L — SIGNIFICANT CHANGE UP (ref 4–32)
BASOPHILS # BLD AUTO: 0.02 K/UL — SIGNIFICANT CHANGE UP (ref 0–0.2)
BASOPHILS NFR BLD AUTO: 0.2 % — SIGNIFICANT CHANGE UP (ref 0–2)
BILIRUB SERPL-MCNC: <0.2 MG/DL — SIGNIFICANT CHANGE UP (ref 0.2–1.2)
BUN SERPL-MCNC: 26 MG/DL — HIGH (ref 7–23)
CALCIUM SERPL-MCNC: 10.1 MG/DL — SIGNIFICANT CHANGE UP (ref 8.4–10.5)
CHLORIDE SERPL-SCNC: 100 MMOL/L — SIGNIFICANT CHANGE UP (ref 98–107)
CO2 SERPL-SCNC: 22 MMOL/L — SIGNIFICANT CHANGE UP (ref 22–31)
CREAT SERPL-MCNC: 2.82 MG/DL — HIGH (ref 0.5–1.3)
EOSINOPHIL # BLD AUTO: 0.21 K/UL — SIGNIFICANT CHANGE UP (ref 0–0.5)
EOSINOPHIL NFR BLD AUTO: 2.5 % — SIGNIFICANT CHANGE UP (ref 0–6)
GLUCOSE SERPL-MCNC: 339 MG/DL — HIGH (ref 70–99)
HCG SERPL-ACNC: <5 MIU/ML — SIGNIFICANT CHANGE UP
HCT VFR BLD CALC: 34.3 % — LOW (ref 34.5–45)
HGB BLD-MCNC: 10.7 G/DL — LOW (ref 11.5–15.5)
IANC: 5.99 K/UL — SIGNIFICANT CHANGE UP (ref 1.5–8.5)
IMM GRANULOCYTES NFR BLD AUTO: 0.5 % — SIGNIFICANT CHANGE UP (ref 0–1.5)
LYMPHOCYTES # BLD AUTO: 1.4 K/UL — SIGNIFICANT CHANGE UP (ref 1–3.3)
LYMPHOCYTES # BLD AUTO: 16.4 % — SIGNIFICANT CHANGE UP (ref 13–44)
MCHC RBC-ENTMCNC: 27.3 PG — SIGNIFICANT CHANGE UP (ref 27–34)
MCHC RBC-ENTMCNC: 31.2 GM/DL — LOW (ref 32–36)
MCV RBC AUTO: 87.5 FL — SIGNIFICANT CHANGE UP (ref 80–100)
MONOCYTES # BLD AUTO: 0.88 K/UL — SIGNIFICANT CHANGE UP (ref 0–0.9)
MONOCYTES NFR BLD AUTO: 10.3 % — SIGNIFICANT CHANGE UP (ref 2–14)
NEUTROPHILS # BLD AUTO: 5.99 K/UL — SIGNIFICANT CHANGE UP (ref 1.8–7.4)
NEUTROPHILS NFR BLD AUTO: 70.1 % — SIGNIFICANT CHANGE UP (ref 43–77)
NRBC # BLD: 0 /100 WBCS — SIGNIFICANT CHANGE UP
NRBC # FLD: 0 K/UL — SIGNIFICANT CHANGE UP
PLATELET # BLD AUTO: 198 K/UL — SIGNIFICANT CHANGE UP (ref 150–400)
POTASSIUM SERPL-MCNC: 4 MMOL/L — SIGNIFICANT CHANGE UP (ref 3.5–5.3)
POTASSIUM SERPL-SCNC: 4 MMOL/L — SIGNIFICANT CHANGE UP (ref 3.5–5.3)
PROT SERPL-MCNC: 7.1 G/DL — SIGNIFICANT CHANGE UP (ref 6–8.3)
RBC # BLD: 3.92 M/UL — SIGNIFICANT CHANGE UP (ref 3.8–5.2)
RBC # FLD: 13 % — SIGNIFICANT CHANGE UP (ref 10.3–14.5)
SODIUM SERPL-SCNC: 133 MMOL/L — LOW (ref 135–145)
WBC # BLD: 8.54 K/UL — SIGNIFICANT CHANGE UP (ref 3.8–10.5)
WBC # FLD AUTO: 8.54 K/UL — SIGNIFICANT CHANGE UP (ref 3.8–10.5)

## 2021-05-08 PROCEDURE — 76830 TRANSVAGINAL US NON-OB: CPT | Mod: 26

## 2021-05-08 NOTE — ED PROVIDER NOTE - PSH
delivery delivered  2002  Fibroid  excision of fibroid in   H/O myomectomy  2005  History of  Section    Miscarriage  x 2  Termination of pregnancy (fetus)  x 2

## 2021-05-08 NOTE — ED PROVIDER NOTE - CLINICAL SUMMARY MEDICAL DECISION MAKING FREE TEXT BOX
Joseph Frankel PGY2: 51 yo F with known fibroids presenting for 2 weeks of vaginal bleeding. VSS. Patient looks well and is non toxic appearing. PE as above. Most likely fibroids vs menopause. Also consider pregnancy(ectopic/miscarraige). Must consider secondary anemia as patient has been bleeding for two weeks but is not symptomatic. Will get labs, tvus, reassess.

## 2021-05-08 NOTE — ED PROVIDER NOTE - PATIENT PORTAL LINK FT
You can access the FollowMyHealth Patient Portal offered by [facility] by registering at the following website: [email or web address]. By joining [facility]’s FollowMyHealth portal, you will also be able to view your health information using other applications (apps) compatible with our system.

## 2021-05-08 NOTE — ED PROVIDER NOTE - NS ED ROS FT
Gen: Denies fever, chills, generalized weakness  CV: Denies chest pain, palpitations  HEENT: Denies neck pain, headache, vision changes  Skin: Denies rash, erythema, color changes  Resp: Denies SOB, cough  Endo: Denies sensitivity to heat, cold, increased urination  GI: Denies constipation, nausea, vomiting, diarrhea  Msk: Denies LE swelling, extremity pain  : Denies dysuria, increased frequency  Neuro: Denies LOC, focal weakness, numbness, tingling  Psych: Denies hx of psych, SI, HI

## 2021-05-08 NOTE — ED PROVIDER NOTE - ATTENDING CONTRIBUTION TO CARE
51 y/o F with h/o HTN, fibroids here with vaginal bleeding.  Pt reports her periods are typically regular every 28 days, lasting 5-7 days.  Last normal period was at the end of April.  2 weeks later pt started to have vaginal bleeding again, using 2-3 pads per day.  No associated abd pain, weakness, lightheadedness, ha, vision changes, cp, sob, n/v.  Well appearing, lying comfortably in stretcher, awake and alert, nontoxic.  VSS.  Lungs cta bl.  Cards nl S1/S2, RRR, no MRG.  Abd soft ntnd.  AUB likely due to known uterine fibroids, will r/o pregnancy, anemia, reassess for outpatient GYN follow-up.

## 2021-05-08 NOTE — ED ADULT NURSE NOTE - OBJECTIVE STATEMENT
received pt in room 17 c/o vaginal bleeding after intercourse last week.  states bleeding decreased and had intercourse the following day and bleeding increased. also passing clots. using 2-3 pads an hour.  c/o weakness but no cp, sob, nausea, sweating, dizziness.  occ abd cramping and chronic lower back pain.  states her periods are regular- every 28 days.  lmp 2 weeks ago.  hs of cva 1 1/2 yrs ago with residual right sided weakness.  being examined by md.  for labs, urine and sono.

## 2021-05-08 NOTE — ED PROVIDER NOTE - PMH
CHF (congestive heart failure)  episode prior to placed on heart medications  CRI (Chronic Renal Insufficiency)    DM (Diabetes Mellitus)    Fibroids  uterine fibroids S/P Surgical Resolution  HTN (Hypertension)    Hypertension    Leiomyoma

## 2021-05-08 NOTE — ED PROVIDER NOTE - OBJECTIVE STATEMENT
49 yo F with pmh of HTN, stroke last year, CKD? presenting for vaginal bleeding x2 weeks. Not on AC. Has not had menopause yet. LMP 2 weeks ago. States she has known fibroids. Not using contraception. Is going through 2 pads an hour. Mild pelvic and back cramping. Denies n/v, sob, generalized weakness, cp, lightheadedness, syncope.

## 2021-05-08 NOTE — ED PROVIDER NOTE - NSFOLLOWUPINSTRUCTIONS_ED_ALL_ED_FT
You were seen for vaginal bleeding.     It is most likely caused by fibroids.    Please follow up with OBGYN this week.    Your creatinine was also elevated. Please follow up with your doctor for this.     SEEK IMMEDIATE MEDICAL CARE IF YOU HAVE ANY OF THE FOLLOWING SYMPTOMS: extreme weakness/chest pain/shortness of breath, black or bloody stools, vomiting blood, fainting, fever, or any signs of dehydration.

## 2021-05-08 NOTE — ED PROVIDER NOTE - PROGRESS NOTE DETAILS
Joseph Frankel PGY2: Patient with mild anemia of 10.7 but she reports history of anemia. Patient also has known creatinine elevation. TVUS with evidence of fibroid. Patient has obgyn and renal follow up. Encouraged to see them this wee. Discussed plan and return precautions with patient who understands and agrees. All questions answered.

## 2021-05-08 NOTE — ED PROVIDER NOTE - PHYSICAL EXAMINATION
Gen: Alert and oriented. Lying comfortably in bed. Answering questions appropriately  HEENT: extra occular movements intact, no nasal discharge, mucous membranes moist  CV: Regular rate and rhythm, +S1/S2, no murmurs/rubs/gallops,   Resp: Clear to ausculation bilaterally, no wheezes/rhonchi/rales  GI: Abdomen soft non-distended, non tender to palpation, no masses  Pelvic (chaperoned by RN Mock): mild amount of blood coming from os. Not a strong flow. No other discharge appreciated.  MSK: No open wounds, no bruising, no LE edema, Homans sign negative bl  Neuro: A&Ox4, following commands, moving all four extremities spontaneously  Psych: appropriate mood

## 2021-05-12 ENCOUNTER — RESULT REVIEW (OUTPATIENT)
Age: 51
End: 2021-05-12

## 2021-06-17 ENCOUNTER — INPATIENT (INPATIENT)
Facility: HOSPITAL | Age: 51
LOS: 2 days | Discharge: HOME | End: 2021-06-20
Attending: HOSPITALIST | Admitting: HOSPITALIST
Payer: COMMERCIAL

## 2021-06-17 ENCOUNTER — OUTPATIENT (OUTPATIENT)
Dept: OUTPATIENT SERVICES | Facility: HOSPITAL | Age: 51
LOS: 1 days | End: 2021-06-17
Payer: COMMERCIAL

## 2021-06-17 VITALS
DIASTOLIC BLOOD PRESSURE: 81 MMHG | RESPIRATION RATE: 16 BRPM | HEART RATE: 86 BPM | SYSTOLIC BLOOD PRESSURE: 137 MMHG | HEIGHT: 63 IN | TEMPERATURE: 98 F | OXYGEN SATURATION: 100 %

## 2021-06-17 VITALS
RESPIRATION RATE: 14 BRPM | TEMPERATURE: 99 F | OXYGEN SATURATION: 97 % | SYSTOLIC BLOOD PRESSURE: 132 MMHG | HEART RATE: 77 BPM | WEIGHT: 182.1 LBS | DIASTOLIC BLOOD PRESSURE: 84 MMHG | HEIGHT: 63 IN

## 2021-06-17 DIAGNOSIS — D25.9 LEIOMYOMA OF UTERUS, UNSPECIFIED: Chronic | ICD-10-CM

## 2021-06-17 DIAGNOSIS — E11.65 TYPE 2 DIABETES MELLITUS WITH HYPERGLYCEMIA: ICD-10-CM

## 2021-06-17 DIAGNOSIS — Z33.2 ENCOUNTER FOR ELECTIVE TERMINATION OF PREGNANCY: Chronic | ICD-10-CM

## 2021-06-17 DIAGNOSIS — O03.9 COMPLETE OR UNSPECIFIED SPONTANEOUS ABORTION WITHOUT COMPLICATION: Chronic | ICD-10-CM

## 2021-06-17 DIAGNOSIS — Z98.890 OTHER SPECIFIED POSTPROCEDURAL STATES: Chronic | ICD-10-CM

## 2021-06-17 DIAGNOSIS — N92.0 EXCESSIVE AND FREQUENT MENSTRUATION WITH REGULAR CYCLE: ICD-10-CM

## 2021-06-17 DIAGNOSIS — N92.1 EXCESSIVE AND FREQUENT MENSTRUATION WITH IRREGULAR CYCLE: ICD-10-CM

## 2021-06-17 LAB
A1C WITH ESTIMATED AVERAGE GLUCOSE RESULT: 15.8 % — HIGH (ref 4–5.6)
ALBUMIN SERPL ELPH-MCNC: 3.7 G/DL — SIGNIFICANT CHANGE UP (ref 3.3–5)
ALP SERPL-CCNC: 107 U/L — SIGNIFICANT CHANGE UP (ref 40–120)
ALT FLD-CCNC: 18 U/L — SIGNIFICANT CHANGE UP (ref 4–33)
ANION GAP SERPL CALC-SCNC: 11 MMOL/L — SIGNIFICANT CHANGE UP (ref 7–14)
ANION GAP SERPL CALC-SCNC: 11 MMOL/L — SIGNIFICANT CHANGE UP (ref 7–14)
AST SERPL-CCNC: 39 U/L — HIGH (ref 4–32)
B-OH-BUTYR SERPL-SCNC: 0.2 MMOL/L — SIGNIFICANT CHANGE UP (ref 0–0.4)
BASE EXCESS BLDV CALC-SCNC: 0.9 MMOL/L — SIGNIFICANT CHANGE UP (ref -3–2)
BASOPHILS # BLD AUTO: 0.03 K/UL — SIGNIFICANT CHANGE UP (ref 0–0.2)
BASOPHILS NFR BLD AUTO: 0.4 % — SIGNIFICANT CHANGE UP (ref 0–2)
BILIRUB SERPL-MCNC: <0.2 MG/DL — SIGNIFICANT CHANGE UP (ref 0.2–1.2)
BLOOD GAS VENOUS - CREATININE: 2.4 MG/DL — HIGH (ref 0.5–1.3)
BLOOD GAS VENOUS COMPREHENSIVE RESULT: SIGNIFICANT CHANGE UP
BUN SERPL-MCNC: 28 MG/DL — HIGH (ref 7–23)
BUN SERPL-MCNC: 30 MG/DL — HIGH (ref 7–23)
CALCIUM SERPL-MCNC: 9.5 MG/DL — SIGNIFICANT CHANGE UP (ref 8.4–10.5)
CALCIUM SERPL-MCNC: 9.8 MG/DL — SIGNIFICANT CHANGE UP (ref 8.4–10.5)
CHLORIDE BLDV-SCNC: 95 MMOL/L — LOW (ref 96–108)
CHLORIDE SERPL-SCNC: 90 MMOL/L — LOW (ref 98–107)
CHLORIDE SERPL-SCNC: 95 MMOL/L — LOW (ref 98–107)
CO2 SERPL-SCNC: 22 MMOL/L — SIGNIFICANT CHANGE UP (ref 22–31)
CO2 SERPL-SCNC: 24 MMOL/L — SIGNIFICANT CHANGE UP (ref 22–31)
CREAT SERPL-MCNC: 2.24 MG/DL — HIGH (ref 0.5–1.3)
CREAT SERPL-MCNC: 2.4 MG/DL — HIGH (ref 0.5–1.3)
EOSINOPHIL # BLD AUTO: 0.08 K/UL — SIGNIFICANT CHANGE UP (ref 0–0.5)
EOSINOPHIL NFR BLD AUTO: 1.1 % — SIGNIFICANT CHANGE UP (ref 0–6)
ESTIMATED AVERAGE GLUCOSE: 407 MG/DL — HIGH (ref 68–114)
GAS PNL BLDV: 123 MMOL/L — LOW (ref 136–146)
GLUCOSE BLDV-MCNC: 802 MG/DL — CRITICAL HIGH (ref 70–99)
GLUCOSE SERPL-MCNC: 620 MG/DL — CRITICAL HIGH (ref 70–99)
GLUCOSE SERPL-MCNC: 807 MG/DL — CRITICAL HIGH (ref 70–99)
HCG SERPL-ACNC: <5 MIU/ML — SIGNIFICANT CHANGE UP
HCO3 BLDV-SCNC: 23 MMOL/L — SIGNIFICANT CHANGE UP (ref 20–27)
HCT VFR BLD CALC: 39.1 % — SIGNIFICANT CHANGE UP (ref 34.5–45)
HCT VFR BLD CALC: 40.2 % — SIGNIFICANT CHANGE UP (ref 34.5–45)
HCT VFR BLDA CALC: 41.2 % — SIGNIFICANT CHANGE UP (ref 34.5–46.5)
HGB BLD CALC-MCNC: 13.4 G/DL — SIGNIFICANT CHANGE UP (ref 11.5–15.5)
HGB BLD-MCNC: 12.6 G/DL — SIGNIFICANT CHANGE UP (ref 11.5–15.5)
HGB BLD-MCNC: 12.8 G/DL — SIGNIFICANT CHANGE UP (ref 11.5–15.5)
IANC: 5.17 K/UL — SIGNIFICANT CHANGE UP (ref 1.5–8.5)
IMM GRANULOCYTES NFR BLD AUTO: 0.6 % — SIGNIFICANT CHANGE UP (ref 0–1.5)
LACTATE BLDV-MCNC: 1.1 MMOL/L — SIGNIFICANT CHANGE UP (ref 0.5–2)
LYMPHOCYTES # BLD AUTO: 1.25 K/UL — SIGNIFICANT CHANGE UP (ref 1–3.3)
LYMPHOCYTES # BLD AUTO: 17.4 % — SIGNIFICANT CHANGE UP (ref 13–44)
MCHC RBC-ENTMCNC: 27.8 PG — SIGNIFICANT CHANGE UP (ref 27–34)
MCHC RBC-ENTMCNC: 28.5 PG — SIGNIFICANT CHANGE UP (ref 27–34)
MCHC RBC-ENTMCNC: 31.3 GM/DL — LOW (ref 32–36)
MCHC RBC-ENTMCNC: 32.7 GM/DL — SIGNIFICANT CHANGE UP (ref 32–36)
MCV RBC AUTO: 87.1 FL — SIGNIFICANT CHANGE UP (ref 80–100)
MCV RBC AUTO: 88.5 FL — SIGNIFICANT CHANGE UP (ref 80–100)
MONOCYTES # BLD AUTO: 0.63 K/UL — SIGNIFICANT CHANGE UP (ref 0–0.9)
MONOCYTES NFR BLD AUTO: 8.8 % — SIGNIFICANT CHANGE UP (ref 2–14)
NEUTROPHILS # BLD AUTO: 5.17 K/UL — SIGNIFICANT CHANGE UP (ref 1.8–7.4)
NEUTROPHILS NFR BLD AUTO: 71.7 % — SIGNIFICANT CHANGE UP (ref 43–77)
NRBC # BLD: 0 /100 WBCS — SIGNIFICANT CHANGE UP
NRBC # BLD: 0 /100 WBCS — SIGNIFICANT CHANGE UP
NRBC # FLD: 0 K/UL — SIGNIFICANT CHANGE UP
NRBC # FLD: 0 K/UL — SIGNIFICANT CHANGE UP
NT-PROBNP SERPL-SCNC: 334 PG/ML — HIGH
PCO2 BLDV: 50 MMHG — SIGNIFICANT CHANGE UP (ref 41–51)
PH BLDV: 7.34 — SIGNIFICANT CHANGE UP (ref 7.32–7.43)
PLATELET # BLD AUTO: 231 K/UL — SIGNIFICANT CHANGE UP (ref 150–400)
PLATELET # BLD AUTO: 241 K/UL — SIGNIFICANT CHANGE UP (ref 150–400)
PO2 BLDV: 27 MMHG — LOW (ref 35–40)
POTASSIUM BLDV-SCNC: 4.6 MMOL/L — HIGH (ref 3.4–4.5)
POTASSIUM SERPL-MCNC: 4.4 MMOL/L — SIGNIFICANT CHANGE UP (ref 3.5–5.3)
POTASSIUM SERPL-MCNC: 5.8 MMOL/L — HIGH (ref 3.5–5.3)
POTASSIUM SERPL-SCNC: 4.4 MMOL/L — SIGNIFICANT CHANGE UP (ref 3.5–5.3)
POTASSIUM SERPL-SCNC: 5.8 MMOL/L — HIGH (ref 3.5–5.3)
PROT SERPL-MCNC: 7.3 G/DL — SIGNIFICANT CHANGE UP (ref 6–8.3)
RBC # BLD: 4.49 M/UL — SIGNIFICANT CHANGE UP (ref 3.8–5.2)
RBC # BLD: 4.54 M/UL — SIGNIFICANT CHANGE UP (ref 3.8–5.2)
RBC # FLD: 13.2 % — SIGNIFICANT CHANGE UP (ref 10.3–14.5)
RBC # FLD: 13.3 % — SIGNIFICANT CHANGE UP (ref 10.3–14.5)
SAO2 % BLDV: 44.7 % — LOW (ref 60–85)
SARS-COV-2 RNA SPEC QL NAA+PROBE: SIGNIFICANT CHANGE UP
SODIUM SERPL-SCNC: 123 MMOL/L — LOW (ref 135–145)
SODIUM SERPL-SCNC: 130 MMOL/L — LOW (ref 135–145)
TROPONIN T, HIGH SENSITIVITY RESULT: 50 NG/L — SIGNIFICANT CHANGE UP
WBC # BLD: 6.14 K/UL — SIGNIFICANT CHANGE UP (ref 3.8–10.5)
WBC # BLD: 7.2 K/UL — SIGNIFICANT CHANGE UP (ref 3.8–10.5)
WBC # FLD AUTO: 6.14 K/UL — SIGNIFICANT CHANGE UP (ref 3.8–10.5)
WBC # FLD AUTO: 7.2 K/UL — SIGNIFICANT CHANGE UP (ref 3.8–10.5)

## 2021-06-17 PROCEDURE — 76700 US EXAM ABDOM COMPLETE: CPT | Mod: 26

## 2021-06-17 PROCEDURE — 93010 ELECTROCARDIOGRAM REPORT: CPT

## 2021-06-17 RX ORDER — INSULIN LISPRO 100/ML
VIAL (ML) SUBCUTANEOUS
Refills: 0 | Status: DISCONTINUED | OUTPATIENT
Start: 2021-06-17 | End: 2021-06-18

## 2021-06-17 RX ORDER — GLUCAGON INJECTION, SOLUTION 0.5 MG/.1ML
1 INJECTION, SOLUTION SUBCUTANEOUS ONCE
Refills: 0 | Status: DISCONTINUED | OUTPATIENT
Start: 2021-06-17 | End: 2021-06-18

## 2021-06-17 RX ORDER — DEXTROSE 50 % IN WATER 50 %
12.5 SYRINGE (ML) INTRAVENOUS ONCE
Refills: 0 | Status: DISCONTINUED | OUTPATIENT
Start: 2021-06-17 | End: 2021-06-18

## 2021-06-17 RX ORDER — SODIUM CHLORIDE 9 MG/ML
1000 INJECTION, SOLUTION INTRAVENOUS
Refills: 0 | Status: DISCONTINUED | OUTPATIENT
Start: 2021-06-17 | End: 2021-06-18

## 2021-06-17 RX ORDER — SODIUM CHLORIDE 9 MG/ML
1000 INJECTION, SOLUTION INTRAVENOUS ONCE
Refills: 0 | Status: COMPLETED | OUTPATIENT
Start: 2021-06-17 | End: 2021-06-17

## 2021-06-17 RX ORDER — DEXTROSE 50 % IN WATER 50 %
15 SYRINGE (ML) INTRAVENOUS ONCE
Refills: 0 | Status: DISCONTINUED | OUTPATIENT
Start: 2021-06-17 | End: 2021-06-18

## 2021-06-17 RX ORDER — DEXTROSE 50 % IN WATER 50 %
25 SYRINGE (ML) INTRAVENOUS ONCE
Refills: 0 | Status: DISCONTINUED | OUTPATIENT
Start: 2021-06-17 | End: 2021-06-18

## 2021-06-17 RX ORDER — INSULIN GLARGINE 100 [IU]/ML
13 INJECTION, SOLUTION SUBCUTANEOUS AT BEDTIME
Refills: 0 | Status: DISCONTINUED | OUTPATIENT
Start: 2021-06-17 | End: 2021-06-18

## 2021-06-17 RX ORDER — SODIUM CHLORIDE 9 MG/ML
1000 INJECTION INTRAMUSCULAR; INTRAVENOUS; SUBCUTANEOUS ONCE
Refills: 0 | Status: COMPLETED | OUTPATIENT
Start: 2021-06-17 | End: 2021-06-17

## 2021-06-17 RX ADMIN — SODIUM CHLORIDE 1000 MILLILITER(S): 9 INJECTION, SOLUTION INTRAVENOUS at 22:10

## 2021-06-17 RX ADMIN — SODIUM CHLORIDE 1000 MILLILITER(S): 9 INJECTION INTRAMUSCULAR; INTRAVENOUS; SUBCUTANEOUS at 19:09

## 2021-06-17 NOTE — H&P PST ADULT - NSICDXPROBLEM_GEN_ALL_CORE_FT
PROBLEM DIAGNOSES  Problem: Excessive and frequent menstruation  Assessment and Plan: Pt scheduled for dilation curettage hysteroscopy on 6/28/2021.  labs done results pending, ekg done. Urine cup provided.  Preop teaching done, pt able to verbalize understanding.   dm- no diabetic medications day of procedure  meds day of procedure-  clonidine, labetolol, amlodipin, doxazosin, pepcid.  hx of chf, cva pt instructed to continue asa, discussed with surgical coordinator Merna.  Pt scheduled for medical eval as per surgeon, pst will also request due to mulitple comorbidities

## 2021-06-17 NOTE — H&P PST ADULT - NSICDXPASTMEDICALHX_GEN_ALL_CORE_FT
PAST MEDICAL HISTORY:  CHF (congestive heart failure) 2011    CVA (cerebral vascular accident) ischemic 2019    Diabetes mellitus type 2    Excessive and frequent menstruation     Hyperlipidemia     Hypertension

## 2021-06-17 NOTE — ED PROVIDER NOTE - NS ED ROS FT
General: denies fever, chills  HENT: denies nasal congestion, rhinorrhea  Eyes: denies visual changes, blurred vision  CV: denies chest pain, palpitations  Resp: denies difficulty breathing, cough  Abdominal: positive abdominal pain; denies nausea, vomiting, diarrhea  : increased urination   MSK: denies muscle aches, leg swelling  Neuro: denies headaches, numbness, tingling  Skin: denies rashes, bruises

## 2021-06-17 NOTE — ED PROVIDER NOTE - OBJECTIVE STATEMENT
49yo F w/ history of fibroids, HTN (on clonidine patch) coming in for new onset diabetes. Patient was at presurgical testing (scheduled for D&C) and was found to have glucose in the 600s. Patient notes for the past month she's had increased fatigue that was initially attributed to the vaginal bleeding but patient also notes increased urinary and thirst as well as occasional upper abdominal pain but no nausea/vomiting/diarrhea. Patient otherwise has been in her normal state of health; denies fevers/chills, chest pain, or SOB. 51yo F w/ history of fibroids, HTN (on clonidine patch) coming in for uncontrolled diabetes. Patient was at presurgical testing (scheduled for D&C) and was found to have glucose in the 600s. Patient notes for the past month she's had increased fatigue that was initially attributed to the vaginal bleeding but patient also notes increased urinary and thirst as well as occasional upper abdominal pain but no nausea/vomiting/diarrhea. Patient otherwise has been in her normal state of health; denies fevers/chills, chest pain, or SOB. Patient typically takes 10 units of insulin and 50 of glipizide but notes she has not taken the glipizide for ~3-4 weeks now for unknown reasons.

## 2021-06-17 NOTE — ED PROVIDER NOTE - PROGRESS NOTE DETAILS
Joseph, PGY1: Patient reassessed and sugars continue to be in the +600s and is on the 3rd bolus of fluids. Will provide 13 of lantis as per endo recs and will admit to hospital given Cr 2.4 (baseline 1.9)

## 2021-06-17 NOTE — ED ADULT NURSE NOTE - NSIMPLEMENTINTERV_GEN_ALL_ED
Implemented All Universal Safety Interventions:  [place] to call system. Call bell, personal items and telephone within reach. Instruct patient to call for assistance. Room bathroom lighting operational. Non-slip footwear when patient is off stretcher. Physically safe environment: no spills, clutter or unnecessary equipment. Stretcher in lowest position, wheels locked, appropriate side rails in place.

## 2021-06-17 NOTE — PROVIDER CONTACT NOTE (CRITICAL VALUE NOTIFICATION) - BACKGROUND
50yr old female with h/o CHF, DM, CVA, presents to PST for pre surgical evaluation for D&C on6/28/21.

## 2021-06-17 NOTE — H&P PST ADULT - HISTORY OF PRESENT ILLNESS
49y/o female scheduled for dilation curettage hysteroscopy on 6/28/2021.  Pt states, " abnormal normal from 4/2021 to 5/2021, US shows fibroids, attempted endometrial bx."

## 2021-06-17 NOTE — H&P PST ADULT - NSANTHOSAYNRD_GEN_A_CORE
No. JANETTE screening performed.  STOP BANG Legend: 0-2 = LOW Risk; 3-4 = INTERMEDIATE Risk; 5-8 = HIGH Risk

## 2021-06-17 NOTE — ED PROVIDER NOTE - ATTENDING CONTRIBUTION TO CARE
ming: pt here with weakness, went to pre-op for vaginal bleeding, but found to have glucose >600.  here it is 800, no gap, bump in creatinine.  hct is ok.  pt awake, alert and appropriate.  abd soft nt. no signs of acute infection.  has not been taking januvia recently (3 weeks) but has been taking her prescribed insulin.  a1c is 15.  hydrating and will admit to manage incrased insulin in th setting of renal insuffiency    I performed a history and physical exam of the patient and discussed their management with the resident and /or advanced care provider. I reviewed the resident and /or ACP's note and agree with the documented findings and plan of care. My medical decison making and observations are found above.

## 2021-06-17 NOTE — ED ADULT NURSE NOTE - PMH
CHF (congestive heart failure)  2011  CVA (cerebral vascular accident)  ischemic 2019  Diabetes mellitus  type 2  Excessive and frequent menstruation    Hyperlipidemia    Hypertension

## 2021-06-17 NOTE — ED PROVIDER NOTE - CLINICAL SUMMARY MEDICAL DECISION MAKING FREE TEXT BOX
51yo F w/ history of fibroids, HTN (on clonidine patch) coming in for new onset diabetes + RUQ, likely secondary to cholelithasis vs choledocolithasis  Plan: labs + iv hydration + RUQUS; likely CDU vs admit for endo consult 49yo F w/ history of fibroids, HTN (on clonidine patch) coming in for new onset diabetes + RUQ, likely secondary to cholelithasis vs choledocolithasis  Plan: labs + iv hydration + RUQUS; likely CDU vs admit for endo consult    ming: pt here with weakness, went to pre-op for vaginal bleeding, but found to have glucose >600.  here it is 800, no gap, bump in creatinine.  hct is ok.  pt awake, alert and appropriate.  abd soft nt. no signs of acute infection.  has not been taking januvia recently (3 weeks) but has been taking her prescribed insulin.  a1c is 15.  hydrating and will admit to manage incrased insulin in th setting of renal insuffiency

## 2021-06-17 NOTE — ED ADULT NURSE REASSESSMENT NOTE - NS ED NURSE REASSESS COMMENT FT1
Pt returned from US. Pt aaox4. Vital signs reassessed as noted. FS reassessed; MD aware. Pt denies N+V, abdominal pain. Will continue to monitor.

## 2021-06-17 NOTE — PROVIDER CONTACT NOTE (CRITICAL VALUE NOTIFICATION) - SITUATION
50yr old female presents to [facility] for pre surgical evaluation for D&C on6/28/21. Serum glucose 620

## 2021-06-17 NOTE — CHART NOTE - NSCHARTNOTEFT_GEN_A_CORE
51yo F w/ history of fibroids, HTN (on clonidine patch) coming in for new onset diabetes. Patient was at presurgical testing (scheduled for D&C) and was found to have glucose in the 600s. Patient notes for the past month she's had increased fatigue that was initially attributed to the vaginal bleeding but patient also notes increased urinary and thirst as well as occasional upper abdominal pain but no nausea/vomiting/diarrhea. Patient otherwise has been in her normal state of health; denies fevers/chills, chest pain, or SOB. HbA1    Endocrine consulted with question of whether pt can be DCd with prompt outpatient Endocrine follow up once glucose levels improve.   Recommend to monitor pt at least in CDU to ensure safer discharge. Pt will need insulin teaching, diabetes education/nutrition consult, and scripts for insulin and supplies prior to DC. Will also allow for assessment of insulin requirement and help in insulin dosing on DC.  A1C 15.8, with BMP glucose 620 on arrival, not in DKA, so unlikely Type 1 DM. No weight in system. GFR 24-28.     Recommend moderate correction scale q4h until BG<300, then can start CC diet with 51yo F w/ history of fibroids, HTN (on clonidine patch) coming in for new onset diabetes. Patient was at presurgical testing (scheduled for D&C) and was found to have glucose in the 600s. Patient notes for the past month she's had increased fatigue that was initially attributed to the vaginal bleeding but patient also notes increased urinary and thirst as well as occasional upper abdominal pain but no nausea/vomiting/diarrhea. Patient otherwise has been in her normal state of health; denies fevers/chills, chest pain, or SOB.     Endocrine consulted with question of whether pt can be DCd with prompt outpatient Endocrine follow up once glucose levels improve.   Recommend to monitor pt at least in CDU to ensure safer discharge. Pt will need insulin teaching, diabetes education/nutrition consult, and scripts for insulin and supplies prior to DC. Will also allow for assessment of insulin requirement and help in insulin dosing on DC.  A1C 15.8, with BMP glucose 620 on arrival. Not in DKA, so unlikely Type 1 DM. No weight in system. GFR 24-28.     No weight in system still. Per team, planned to do weight based dosing with 0.3-0.5 u/kg. Given CKD4, advise 0.3 u/kg for TTD (half as lantus dose and half divided into three for pre-meal admelog). Recommend to hold diet for now and give moderate correction scale q4h until BG<300, then can start CC diet with pre-meal admelog and low pre-meal and low bedtime correction scales.    DW Team    Shirlene Velez DO, Endocrine Fellow   Pager [phone] from 9-5PM. After hours and on weekends please call [phone]. 49yo F w/ history of fibroids, HTN (on clonidine patch) coming in for new onset diabetes. Patient was at presurgical testing (scheduled for D&C) and was found to have glucose in the 600s. Patient notes for the past month she's had increased fatigue that was initially attributed to the vaginal bleeding but patient also notes increased urinary and thirst as well as occasional upper abdominal pain but no nausea/vomiting/diarrhea. Patient otherwise has been in her normal state of health; denies fevers/chills, chest pain, or SOB.     Endocrine consulted with question of whether pt can be DCd with prompt outpatient Endocrine follow up once glucose levels improve.   Recommend to monitor pt at least in CDU to ensure safer discharge. Pt will need insulin teaching, diabetes education/nutrition consult, and scripts for insulin and supplies prior to DC. Will also allow for assessment of insulin requirement and help in insulin dosing on DC.  A1C 15.8, with BMP glucose 620 on arrival. Not in DKA, so unlikely Type 1 DM. No weight in system. GFR 24-28.     No weight in system still. Per team, planned to do weight based dosing with 0.3-0.5 u/kg. Given CKD4, advise 0.3 u/kg for TTD (half as lantus dose and half divided into three for pre-meal admelog). Recommend give lantus dose STAT and STAT dose of moderate correction scale. Recommend to hold diet for now and give moderate correction scale q4h until BG<300, then can start CC diet with pre-meal admelog and low pre-meal and low bedtime correction scales.    DW Team    Shirlene Veelz DO, Endocrine Fellow   Pager [phone] from 9-5PM. After hours and on weekends please call [phone].

## 2021-06-17 NOTE — PROVIDER CONTACT NOTE (CRITICAL VALUE NOTIFICATION) - ACTION/TREATMENT ORDERED:
Notified PCP Dr Garcia. spoke to Quynh his RN. will follow up with pt. unable to reach call the pt multiple time. fs on the day of surgery. Medical evaluation prior surgery. Notified PCP Dr Garcia. spoke to Quynh his RN. will follow up with pt. unable to reach call the pt multiple time till 5pm instructed the pt to go to [facility] on the day of surgery. Medical evaluation prior surgery.

## 2021-06-17 NOTE — ED ADULT TRIAGE NOTE - CHIEF COMPLAINT QUOTE
Pt states she was getting pre surgical testing done and was told her a1c was 15 and bs was 652. Pt only c/o fatigue. PMH: DM2, HTN   FS HI

## 2021-06-17 NOTE — ED PROVIDER NOTE - PHYSICAL EXAMINATION
GENERAL: well appearing in no acute distress, non-toxic appearing  HEAD: normocephalic, atraumatic  HENT: airway intact, neck supple; oral mucosa dry  EYES: normal conjunctiva, EOMI  CARDIAC: regular rate and rhythm, normal S1S2, no appreciable murmurs, 2+ pulses in UE/LE b/l  PULM: normal breath sounds, clear to ascultation bilaterally, no rales, rhonchi, wheezing  GI: abdomen nondistended, soft, RUQ TTP w/ + valdez's sign; no guarding, rebound tenderness  : no CVA tenderness b/l, no suprapubic tenderness  NEURO: no focal motor or sensory deficits  MSK: no peripheral edema, no calf tenderness b/l  SKIN: well-perfused, extremities warm, no visible rashes

## 2021-06-18 DIAGNOSIS — E78.5 HYPERLIPIDEMIA, UNSPECIFIED: ICD-10-CM

## 2021-06-18 DIAGNOSIS — E11.65 TYPE 2 DIABETES MELLITUS WITH HYPERGLYCEMIA: ICD-10-CM

## 2021-06-18 DIAGNOSIS — I10 ESSENTIAL (PRIMARY) HYPERTENSION: ICD-10-CM

## 2021-06-18 DIAGNOSIS — N18.9 CHRONIC KIDNEY DISEASE, UNSPECIFIED: ICD-10-CM

## 2021-06-18 DIAGNOSIS — I50.9 HEART FAILURE, UNSPECIFIED: ICD-10-CM

## 2021-06-18 DIAGNOSIS — D21.9 BENIGN NEOPLASM OF CONNECTIVE AND OTHER SOFT TISSUE, UNSPECIFIED: ICD-10-CM

## 2021-06-18 DIAGNOSIS — Z29.9 ENCOUNTER FOR PROPHYLACTIC MEASURES, UNSPECIFIED: ICD-10-CM

## 2021-06-18 DIAGNOSIS — E11.22 TYPE 2 DIABETES MELLITUS WITH DIABETIC CHRONIC KIDNEY DISEASE: ICD-10-CM

## 2021-06-18 DIAGNOSIS — I63.9 CEREBRAL INFARCTION, UNSPECIFIED: ICD-10-CM

## 2021-06-18 LAB
ALBUMIN SERPL ELPH-MCNC: 3.4 G/DL — SIGNIFICANT CHANGE UP (ref 3.3–5)
ALP SERPL-CCNC: 90 U/L — SIGNIFICANT CHANGE UP (ref 40–120)
ALT FLD-CCNC: 12 U/L — SIGNIFICANT CHANGE UP (ref 4–33)
ANION GAP SERPL CALC-SCNC: 11 MMOL/L — SIGNIFICANT CHANGE UP (ref 7–14)
AST SERPL-CCNC: 17 U/L — SIGNIFICANT CHANGE UP (ref 4–32)
BILIRUB SERPL-MCNC: 0.2 MG/DL — SIGNIFICANT CHANGE UP (ref 0.2–1.2)
BUN SERPL-MCNC: 21 MG/DL — SIGNIFICANT CHANGE UP (ref 7–23)
CALCIUM SERPL-MCNC: 10.1 MG/DL — SIGNIFICANT CHANGE UP (ref 8.4–10.5)
CHLORIDE SERPL-SCNC: 104 MMOL/L — SIGNIFICANT CHANGE UP (ref 98–107)
CO2 SERPL-SCNC: 23 MMOL/L — SIGNIFICANT CHANGE UP (ref 22–31)
CREAT SERPL-MCNC: 1.79 MG/DL — HIGH (ref 0.5–1.3)
GLUCOSE SERPL-MCNC: 78 MG/DL — SIGNIFICANT CHANGE UP (ref 70–99)
HCT VFR BLD CALC: 36.3 % — SIGNIFICANT CHANGE UP (ref 34.5–45)
HGB BLD-MCNC: 11.9 G/DL — SIGNIFICANT CHANGE UP (ref 11.5–15.5)
MAGNESIUM SERPL-MCNC: 2 MG/DL — SIGNIFICANT CHANGE UP (ref 1.6–2.6)
MCHC RBC-ENTMCNC: 28.5 PG — SIGNIFICANT CHANGE UP (ref 27–34)
MCHC RBC-ENTMCNC: 32.8 GM/DL — SIGNIFICANT CHANGE UP (ref 32–36)
MCV RBC AUTO: 86.8 FL — SIGNIFICANT CHANGE UP (ref 80–100)
NRBC # BLD: 0 /100 WBCS — SIGNIFICANT CHANGE UP
NRBC # FLD: 0 K/UL — SIGNIFICANT CHANGE UP
PHOSPHATE SERPL-MCNC: 2 MG/DL — LOW (ref 2.5–4.5)
PLATELET # BLD AUTO: 223 K/UL — SIGNIFICANT CHANGE UP (ref 150–400)
POTASSIUM SERPL-MCNC: 3.7 MMOL/L — SIGNIFICANT CHANGE UP (ref 3.5–5.3)
POTASSIUM SERPL-SCNC: 3.7 MMOL/L — SIGNIFICANT CHANGE UP (ref 3.5–5.3)
PROT SERPL-MCNC: 5.9 G/DL — LOW (ref 6–8.3)
RBC # BLD: 4.18 M/UL — SIGNIFICANT CHANGE UP (ref 3.8–5.2)
RBC # FLD: 13.2 % — SIGNIFICANT CHANGE UP (ref 10.3–14.5)
SODIUM SERPL-SCNC: 138 MMOL/L — SIGNIFICANT CHANGE UP (ref 135–145)
WBC # BLD: 6.13 K/UL — SIGNIFICANT CHANGE UP (ref 3.8–10.5)
WBC # FLD AUTO: 6.13 K/UL — SIGNIFICANT CHANGE UP (ref 3.8–10.5)

## 2021-06-18 PROCEDURE — 99223 1ST HOSP IP/OBS HIGH 75: CPT

## 2021-06-18 RX ORDER — GLUCAGON INJECTION, SOLUTION 0.5 MG/.1ML
1 INJECTION, SOLUTION SUBCUTANEOUS ONCE
Refills: 0 | Status: DISCONTINUED | OUTPATIENT
Start: 2021-06-18 | End: 2021-06-20

## 2021-06-18 RX ORDER — DEXTROSE 50 % IN WATER 50 %
25 SYRINGE (ML) INTRAVENOUS ONCE
Refills: 0 | Status: DISCONTINUED | OUTPATIENT
Start: 2021-06-18 | End: 2021-06-18

## 2021-06-18 RX ORDER — INSULIN LISPRO 100/ML
VIAL (ML) SUBCUTANEOUS AT BEDTIME
Refills: 0 | Status: DISCONTINUED | OUTPATIENT
Start: 2021-06-18 | End: 2021-06-18

## 2021-06-18 RX ORDER — INSULIN LISPRO 100/ML
5 VIAL (ML) SUBCUTANEOUS
Refills: 0 | Status: DISCONTINUED | OUTPATIENT
Start: 2021-06-18 | End: 2021-06-19

## 2021-06-18 RX ORDER — SODIUM CHLORIDE 9 MG/ML
1000 INJECTION, SOLUTION INTRAVENOUS
Refills: 0 | Status: DISCONTINUED | OUTPATIENT
Start: 2021-06-18 | End: 2021-06-20

## 2021-06-18 RX ORDER — SODIUM CHLORIDE 9 MG/ML
1000 INJECTION, SOLUTION INTRAVENOUS
Refills: 0 | Status: DISCONTINUED | OUTPATIENT
Start: 2021-06-18 | End: 2021-06-18

## 2021-06-18 RX ORDER — INSULIN LISPRO 100/ML
VIAL (ML) SUBCUTANEOUS AT BEDTIME
Refills: 0 | Status: DISCONTINUED | OUTPATIENT
Start: 2021-06-18 | End: 2021-06-20

## 2021-06-18 RX ORDER — AMLODIPINE BESYLATE 2.5 MG/1
10 TABLET ORAL DAILY
Refills: 0 | Status: DISCONTINUED | OUTPATIENT
Start: 2021-06-18 | End: 2021-06-20

## 2021-06-18 RX ORDER — INSULIN LISPRO 100/ML
VIAL (ML) SUBCUTANEOUS EVERY 4 HOURS
Refills: 0 | Status: DISCONTINUED | OUTPATIENT
Start: 2021-06-18 | End: 2021-06-18

## 2021-06-18 RX ORDER — DEXTROSE 50 % IN WATER 50 %
25 SYRINGE (ML) INTRAVENOUS ONCE
Refills: 0 | Status: DISCONTINUED | OUTPATIENT
Start: 2021-06-18 | End: 2021-06-20

## 2021-06-18 RX ORDER — DEXTROSE 50 % IN WATER 50 %
12.5 SYRINGE (ML) INTRAVENOUS ONCE
Refills: 0 | Status: DISCONTINUED | OUTPATIENT
Start: 2021-06-18 | End: 2021-06-20

## 2021-06-18 RX ORDER — INSULIN LISPRO 100/ML
4 VIAL (ML) SUBCUTANEOUS
Refills: 0 | Status: DISCONTINUED | OUTPATIENT
Start: 2021-06-18 | End: 2021-06-18

## 2021-06-18 RX ORDER — LABETALOL HCL 100 MG
400 TABLET ORAL
Refills: 0 | Status: DISCONTINUED | OUTPATIENT
Start: 2021-06-18 | End: 2021-06-20

## 2021-06-18 RX ORDER — GLUCAGON INJECTION, SOLUTION 0.5 MG/.1ML
1 INJECTION, SOLUTION SUBCUTANEOUS ONCE
Refills: 0 | Status: DISCONTINUED | OUTPATIENT
Start: 2021-06-18 | End: 2021-06-18

## 2021-06-18 RX ORDER — INSULIN GLARGINE 100 [IU]/ML
12 INJECTION, SOLUTION SUBCUTANEOUS AT BEDTIME
Refills: 0 | Status: DISCONTINUED | OUTPATIENT
Start: 2021-06-18 | End: 2021-06-18

## 2021-06-18 RX ORDER — DOXAZOSIN MESYLATE 4 MG
8 TABLET ORAL AT BEDTIME
Refills: 0 | Status: DISCONTINUED | OUTPATIENT
Start: 2021-06-18 | End: 2021-06-20

## 2021-06-18 RX ORDER — ACETAMINOPHEN 500 MG
650 TABLET ORAL EVERY 6 HOURS
Refills: 0 | Status: DISCONTINUED | OUTPATIENT
Start: 2021-06-18 | End: 2021-06-20

## 2021-06-18 RX ORDER — ERGOCALCIFEROL 1.25 MG/1
50000 CAPSULE ORAL
Refills: 0 | Status: DISCONTINUED | OUTPATIENT
Start: 2021-06-18 | End: 2021-06-20

## 2021-06-18 RX ORDER — INSULIN GLARGINE 100 [IU]/ML
16 INJECTION, SOLUTION SUBCUTANEOUS AT BEDTIME
Refills: 0 | Status: DISCONTINUED | OUTPATIENT
Start: 2021-06-18 | End: 2021-06-18

## 2021-06-18 RX ORDER — DEXTROSE 50 % IN WATER 50 %
15 SYRINGE (ML) INTRAVENOUS ONCE
Refills: 0 | Status: DISCONTINUED | OUTPATIENT
Start: 2021-06-18 | End: 2021-06-18

## 2021-06-18 RX ORDER — LOSARTAN POTASSIUM 100 MG/1
25 TABLET, FILM COATED ORAL DAILY
Refills: 0 | Status: DISCONTINUED | OUTPATIENT
Start: 2021-06-18 | End: 2021-06-20

## 2021-06-18 RX ORDER — DEXTROSE 50 % IN WATER 50 %
15 SYRINGE (ML) INTRAVENOUS ONCE
Refills: 0 | Status: DISCONTINUED | OUTPATIENT
Start: 2021-06-18 | End: 2021-06-20

## 2021-06-18 RX ORDER — INSULIN LISPRO 100/ML
6 VIAL (ML) SUBCUTANEOUS
Refills: 0 | Status: DISCONTINUED | OUTPATIENT
Start: 2021-06-18 | End: 2021-06-18

## 2021-06-18 RX ORDER — INSULIN GLARGINE 100 [IU]/ML
16 INJECTION, SOLUTION SUBCUTANEOUS AT BEDTIME
Refills: 0 | Status: DISCONTINUED | OUTPATIENT
Start: 2021-06-18 | End: 2021-06-19

## 2021-06-18 RX ORDER — INSULIN LISPRO 100/ML
VIAL (ML) SUBCUTANEOUS
Refills: 0 | Status: DISCONTINUED | OUTPATIENT
Start: 2021-06-18 | End: 2021-06-20

## 2021-06-18 RX ORDER — FOLIC ACID 0.8 MG
1 TABLET ORAL DAILY
Refills: 0 | Status: DISCONTINUED | OUTPATIENT
Start: 2021-06-18 | End: 2021-06-20

## 2021-06-18 RX ORDER — ATORVASTATIN CALCIUM 80 MG/1
80 TABLET, FILM COATED ORAL AT BEDTIME
Refills: 0 | Status: DISCONTINUED | OUTPATIENT
Start: 2021-06-18 | End: 2021-06-20

## 2021-06-18 RX ORDER — DEXTROSE 50 % IN WATER 50 %
12.5 SYRINGE (ML) INTRAVENOUS ONCE
Refills: 0 | Status: DISCONTINUED | OUTPATIENT
Start: 2021-06-18 | End: 2021-06-18

## 2021-06-18 RX ORDER — ASPIRIN/CALCIUM CARB/MAGNESIUM 324 MG
81 TABLET ORAL DAILY
Refills: 0 | Status: DISCONTINUED | OUTPATIENT
Start: 2021-06-18 | End: 2021-06-20

## 2021-06-18 RX ADMIN — Medication 81 MILLIGRAM(S): at 12:23

## 2021-06-18 RX ADMIN — Medication 3: at 17:34

## 2021-06-18 RX ADMIN — Medication 1 MILLIGRAM(S): at 12:23

## 2021-06-18 RX ADMIN — Medication 400 MILLIGRAM(S): at 17:34

## 2021-06-18 RX ADMIN — Medication 5 UNIT(S): at 17:35

## 2021-06-18 RX ADMIN — Medication 8 MILLIGRAM(S): at 22:23

## 2021-06-18 RX ADMIN — AMLODIPINE BESYLATE 10 MILLIGRAM(S): 2.5 TABLET ORAL at 12:23

## 2021-06-18 RX ADMIN — INSULIN GLARGINE 13 UNIT(S): 100 INJECTION, SOLUTION SUBCUTANEOUS at 00:22

## 2021-06-18 RX ADMIN — ATORVASTATIN CALCIUM 80 MILLIGRAM(S): 80 TABLET, FILM COATED ORAL at 22:27

## 2021-06-18 RX ADMIN — Medication 3: at 22:23

## 2021-06-18 RX ADMIN — INSULIN GLARGINE 16 UNIT(S): 100 INJECTION, SOLUTION SUBCUTANEOUS at 22:22

## 2021-06-18 RX ADMIN — Medication 12: at 02:07

## 2021-06-18 RX ADMIN — Medication 2: at 05:52

## 2021-06-18 NOTE — H&P ADULT - PROBLEM SELECTOR PLAN 3
- Continue PO Rosuvastatin 20mg QD  - Check lipids  -  on proper diet and exercise - Continue statin  - Check lipids  -  on proper diet and exercise

## 2021-06-18 NOTE — H&P ADULT - PROBLEM SELECTOR PLAN 2
- Pt scheduled for outpatient D&C for fibroid removal  - Tylenol 650mg Q6 PRN for abdominal pain  - Monitor for AUB  - Check CBCs  - f/u outpatient GYN - Pt scheduled for outpatient D&C for fibroid removal  - Tylenol 650mg Q6 PRN for abdominal pain  - Monitor for AUB  - Check CBCs  - consider GYN c/s

## 2021-06-18 NOTE — H&P ADULT - NSHPSOCIALHISTORY_GEN_ALL_CORE
Marital Status: Lives in [place] in a private home with  and her son.    Occupation: Registered Nurse @ [employer]    Tobacco Use: Denies past or current use    ETOH Use: Socially - about 2 beers on weekends    Drug Use: Denies past or current use    Flu Vaccine:                            Pneumonia Vaccine:                                 COVID Vaccine:

## 2021-06-18 NOTE — H&P ADULT - HISTORY OF PRESENT ILLNESS
51 y/o female c pmhx significant for CHF, CVA (2019) on aspirin, HTN, fibroids, HLD, and T2DM, presents to the [facility] ED with uncontrolled diabetes. Patient was being seen at pre-surgical testing (for schedueld D&C) where she was found to have a glucose level of >600. Patient was asymptomatic at the time of arrival. Her diabetic medications are Insulin 10units QD and Januvia 50mg QD. Patient admitted that she likely hasn't taken her Januvia in "about 3 weeks". She is unsure why she did not take the Januvia, saying that "it is normally in a case with my other medications but the other day I checked and it wasn't there". She noted fatigue since early May, which she attributed to her vaginal bleeding that start right around the same time. She admits to increased thirst and to increased urinary frequency. Pt admits to dizziness, lightheadedness and abdominal pain 2/2 her fibroid at this time. She denies any nausea, vomiting, diarrhea, SOB, fevers, chills, chest pain, palpitations. She denies recent sick contacts or illness. 51 y/o female, with a PmHx of CHF, CVA (2019) on aspirin, CKD (baseline Cr: 1.8-1.9), HTN, uterine fibroids, HLD, and T2DM, presents to the [facility] ED with uncontrolled diabetes. Patient was being seen at pre-surgical testing (for scheduled D&C). She was getting a D & C for a uterine fibroid that was causing her to have irregular heavy bleeding and the plan for to do a biopsy and a D & C. While in pre-surgical testing, she was found to have a blood glucose > 600 so she was sent to the [facility] ED for an evaluation. Patient was asymptomatic at the time of arrival. Her diabetic medications are Insulin 10units QD and Januvia 50mg QD. Patient admitted that she likely hasn't taken her Januvia in "about 3 weeks". She states she ran out of the Januvia, saying that "it is normally in a case with my other medications but the other day I checked and it wasn't there". She was still taking her basal insulin. She noted fatigue since early May, which she attributed to her vaginal bleeding that start right around the same time. She also admits to increased thirst and to increased urinary frequency. Pt admits to dizziness, lightheadedness and abdominal pain 2/2 her fibroid at this time. She denies any nausea, vomiting, diarrhea, SOB, fevers, chills, chest pain, palpitations. She denies recent sick contacts or illness. In the [facility] ED, she was found to have a serum glucose of 807. She was seen by [facility]. She was not in DKA since she had a neg Beta Hydroxy Butyrate. She is now being admitted to medicine for EMILY on CKD and for uncontrolled DM with a HgbA1-c of 15.2.

## 2021-06-18 NOTE — H&P ADULT - PROBLEM SELECTOR PLAN 8
- DVT ppx: SCDs, early ambulation as tolerated - hold dvt chemical prophylaxis due to fibroid bleeding; will order HAL stockings for now

## 2021-06-18 NOTE — H&P ADULT - NEGATIVE GENERAL GENITOURINARY SYMPTOMS
no hematuria/no renal colic/no flank pain L/no flank pain R/no urine discoloration/no gas in urine/no bladder infections/no incontinence/no dysuria/no urinary hesitancy

## 2021-06-18 NOTE — H&P ADULT - NSHPPHYSICALEXAM_GEN_ALL_CORE
Vital Signs Last 24 Hrs  T(C): 37.1 (18 Jun 2021 08:46), Max: 37.1 (18 Jun 2021 08:46)  T(F): 98.8 (18 Jun 2021 08:46), Max: 98.8 (18 Jun 2021 08:46)  HR: 70 (18 Jun 2021 08:46) (60 - 86)  BP: 161/94 (18 Jun 2021 08:46) (137/81 - 168/60)  BP(mean): --  RR: 17 (18 Jun 2021 08:46) (16 - 18)  SpO2: 100% (18 Jun 2021 08:46) (100% - 100%)    EKG: NSR @ 64, QTc 444, T-wave inversion Leads I, aVL Vital Signs Last 24 Hrs  T(C): 37.1 (18 Jun 2021 08:46), Max: 37.1 (18 Jun 2021 08:46)  T(F): 98.8 (18 Jun 2021 08:46), Max: 98.8 (18 Jun 2021 08:46)  HR: 70 (18 Jun 2021 08:46) (60 - 86)  BP: 161/94 (18 Jun 2021 08:46) (137/81 - 168/60)  BP(mean): --  RR: 17 (18 Jun 2021 08:46) (16 - 18)  SpO2: 100% (18 Jun 2021 08:46) (100% - 100%)

## 2021-06-18 NOTE — H&P ADULT - ASSESSMENT
49 y/o female c pmhx significant for CHF, CVA (2019) on aspirin, HTN, fibroids, HLD, and T2DM, presents to the [facility] ED with uncontrolled diabetes. In the ED, patient was found to have blood glucose of >800 w/ A1c >15. Patient being admitted for diabetic management. 49 y/o female, with a PmHx of CHF, CVA (2019) on aspirin, CKD, HTN, uterine fibroids, HLD, and T2DM, presents to the [facility] ED with uncontrolled diabetes. Found to have EMILY on CKD and uncontrolled DM with a HgbA1-c of 15.2.

## 2021-06-18 NOTE — H&P ADULT - NEGATIVE ENMT SYMPTOMS
no hearing difficulty/no ear pain/no tinnitus/no vertigo/no sinus symptoms/no nasal congestion/no nasal discharge/no nasal obstruction/no post-nasal discharge/no nose bleeds/no recurrent cold sores/no abnormal taste sensation/no gum bleeding/no throat pain/no dysphagia

## 2021-06-18 NOTE — CONSULT NOTE ADULT - PROBLEM SELECTOR RECOMMENDATION 9
- A1c 15.2%, goal is < 7%  - discussed with pt complications of uncontrolled DM2, which include retinopathy, need for dialysis, amputations, etc  - adjust Lantus to 16 units qhs  - adjust Admelog to 6 units before meals  - low correction scale qac and qhs  - consistent carb diet  - check FS qac and qhs   - RD consult  - will follow  - for discharge: dc on basal bolus insulin (which she is agreeable to), doses TBD. She can follow up in the office if she wishes - [phone] - [address], [address], [facility] - A1c 15.2%, goal is < 7%  - discussed with pt complications of uncontrolled DM2, which include retinopathy, need for dialysis, amputations, etc  - adjust Lantus to 16 units qhs  - adjust Admelog to 5 units before meals  - low correction scale qac and qhs  - consistent carb diet  - check FS qac and qhs   - RD consult  - will follow  - for discharge: dc on basal bolus insulin (which she is agreeable to), doses TBD. She can follow up in the office if she wishes - [phone] - [address], [address], [facility]

## 2021-06-18 NOTE — CONSULT NOTE ADULT - PROBLEM SELECTOR RECOMMENDATION 2
- pt hypertensive, goal BP < 130/80  - on Amlodipine, Clonidine, and Labetalol - adjust as needed to achieve goal BP

## 2021-06-18 NOTE — CONSULT NOTE ADULT - ASSESSMENT
50 year old woman with PMH CHF, CVA (2019) on aspirin, HTN, HLD, fibroids, uncontrolled DM2, presents to the [facility] ED with severe hyperglycemia to the 800s, in setting of severely uncontrolled DM2 with A1c of 15.2%. BG goal 100-180 mg/dL. High risk patient with high level decision making, at high risk of worsening microvascular and macrovascular complications.

## 2021-06-18 NOTE — H&P ADULT - PROBLEM SELECTOR PLAN 6
- Monitor for JVD, edema, pulmonary congestion  - Monitor fluid input/output  - Check BMP - Monitor for JVD, edema, pulmonary congestion  - Monitor fluid input/output

## 2021-06-18 NOTE — CONSULT NOTE ADULT - SUBJECTIVE AND OBJECTIVE BOX
HPI:  Patient is a 50 year old woman with PMH CHF, CVA (2019) on aspirin, HTN, HLD, fibroids, uncontrolled DM2, presents to the [facility] ED with severe hyperglycemia to the 800s. Patient was being seen at pre-surgical testing (for schedueld D&C) where she was found to have a glucose level of >600, was instructed to come to the ED.  HbA1c is 15.2%.  Patient states that she was diagnosed with DM2 years ago, follows with her PCP. She is on Lantus 10 units qhs (misses 1-2 times a week) and also takes Januvia 50 mg daily. However, she states that she keeps her pills in a pill box, and it was missing the Januvia, so she hasn't taken it in weeks. Checks BG twice a day and notes that her BG has been running in the 300s and higher, sometimes her glucometer would read high. Despite this, she states that she had no idea her DM was uncontrolled, until she went to presurgical testing yesterday and was told that her BG was high. She does not have sx of neuropathy. She has not seen optho recently, no known retinopathy, has never received laser or injections to the eyes. She has CKD stage 3b. She states that a creatinine of 1.8 is normal for her. Diet wise, she does not think she has made any changes to her diet recently, although she does have an erratic eating schedule. 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     PAST MEDICAL & SURGICAL HISTORY:  Hypertension    Diabetes mellitus  type 2    Hyperlipidemia    CHF (congestive heart failure)      CVA (cerebral vascular accident)  ischemic     Excessive and frequent menstruation    Fibroid    S/P myomectomy  2005     delivery delivered        FAMILY HISTORY:  FH: HTN (hypertension)    DM2 in father       Social History:  no cigarette use  no alcohol use  registered nurse at [employer], works in the ED    Outpatient Medications:  · 	cloNIDine 0.2 mg/24 hr transdermal film, extended release: Last Dose Taken:  , 1 patch transdermal once a week, Sat  · 	labetalol 200 mg oral tablet: Last Dose Taken:  , 2 tab(s) orally 2 times a day  · 	rosuvastatin 20 mg oral tablet: Last Dose Taken:  , 1 tab(s) orally once a day  · 	olmesartan 5 mg oral tablet: Last Dose Taken:  , 2 tab(s) orally once a day  · 	Januvia 50 mg oral tablet: Last Dose Taken:  , 1 tab(s) orally once a day  · 	folic acid 1 mg oral tablet: Last Dose Taken:  , 2 tab(s) orally once a day  · 	amLODIPine 10 mg oral tablet: Last Dose Taken:  , 1 tab(s) orally once a day  · 	aspirin 81 mg oral tablet: Last Dose Taken:  , 1 tab(s) orally once a day  · 	doxazosin 8 mg oral tablet: Last Dose Taken:  , 1 tab(s) orally once a day (at bedtime)  · 	Vitamin D2 50,000 intl units (1.25 mg) oral capsule: Last Dose Taken:  , 1 cap(s) orally once a week on   · 	insulin glargine 100 units/mL subcutaneous solution: Last Dose Taken:  , 10 unit(s) subcutaneous once a day (at bedtime)      MEDICATIONS  (STANDING):  amLODIPine   Tablet 10 milliGRAM(s) Oral daily  aspirin enteric coated 81 milliGRAM(s) Oral daily  atorvastatin 80 milliGRAM(s) Oral at bedtime  cloNIDine Patch 0.2 mG/24Hr(s) 1 patch Transdermal <User Schedule>  dextrose 40% Gel 15 Gram(s) Oral once  dextrose 5%. 1000 milliLiter(s) (50 mL/Hr) IV Continuous <Continuous>  dextrose 5%. 1000 milliLiter(s) (100 mL/Hr) IV Continuous <Continuous>  dextrose 50% Injectable 25 Gram(s) IV Push once  dextrose 50% Injectable 12.5 Gram(s) IV Push once  dextrose 50% Injectable 25 Gram(s) IV Push once  doxazosin 8 milliGRAM(s) Oral at bedtime  ergocalciferol 51789 Unit(s) Oral <User Schedule>  folic acid 1 milliGRAM(s) Oral daily  glucagon  Injectable 1 milliGRAM(s) IntraMuscular once  insulin glargine Injectable (LANTUS) 12 Unit(s) SubCutaneous at bedtime  insulin lispro (ADMELOG) corrective regimen sliding scale   SubCutaneous three times a day before meals  insulin lispro (ADMELOG) corrective regimen sliding scale   SubCutaneous at bedtime  insulin lispro Injectable (ADMELOG) 4 Unit(s) SubCutaneous three times a day before meals  labetalol 400 milliGRAM(s) Oral two times a day    MEDICATIONS  (PRN):  acetaminophen   Tablet .. 650 milliGRAM(s) Oral every 6 hours PRN Temp greater or equal to 38C (100.4F), Mild Pain (1 - 3), Moderate Pain (4 - 6)      Allergies  No Known Allergies    Review of Systems:  Constitutional: No fever  Eyes: No blurry vision  Neuro: No tremors  HEENT: No pain  Cardiovascular: No chest pain, palpitations  Respiratory: No SOB, no cough  GI: No nausea, vomiting, abdominal pain  : No dysuria  Skin: no rash  Endocrine: + polyuria, polydipsia    ALL OTHER SYSTEMS REVIEWED AND NEGATIVE    PHYSICAL EXAM:  VITALS: T(C): 37.1 (21 @ 08:46)  T(F): 98.8 (21 @ 08:46), Max: 98.8 (21 @ 08:46)  HR: 70 (21 @ 08:46) (60 - 86)  BP: 161/94 (21 @ 08:46) (137/81 - 168/60)  RR:  (16 - 18)  SpO2:  (100% - 100%)  Wt(kg): --  GENERAL: NAD, well-developed  EYES: No proptosis, anicteric  HEENT:  Atraumatic, Normocephalic  THYROID: Normal size, no palpable nodules  RESPIRATORY: Clear to auscultation bilaterally; No rales, rhonchi, wheezing  CARDIOVASCULAR: Regular rate and rhythm; No murmurs; no peripheral edema  GI: Soft, nontender, non distended, normal bowel sounds  SKIN: Dry, intact, No ulcers on feet  MUSCULOSKELETAL: Full range of motion, normal strength  PSYCH: Alert and oriented x 3, reactive affect, euthymic mood       POCT Blood Glucose.: 176 mg/dL (21 @ 05:48) A 2  POCT Blood Glucose.: 426 mg/dL (21 @ 01:47) A 12  POCT Blood Glucose.: 456 mg/dL (21 @ 00:17) L 13  POCT Blood Glucose.: >600 mg/dL (21 @ 21:35)  POCT Blood Glucose.: >600 mg/dL (21 @ 18:12)                          11.9   6.13  )-----------( 223      ( 2021 08:12 )             36.3       18    138  |  104  |  21  ----------------------------<  78  3.7   |  23  |  1.79<H>    EGFR if : 37<L>  EGFR if non : 32<L>    Ca    10.1        Mg     2.0       Phos  2.0     18    TPro  5.9<L>  /  Alb  3.4  /  TBili  0.2  /  DBili  x   /  AST  17  /  ALT  12  /  AlkPhos  90        HbA1c 15.2%

## 2021-06-18 NOTE — H&P ADULT - GASTROINTESTINAL COMMENTS
Admits to recent decrease in BM frequency (usually 1 per day; Last BM over 2 days ago); abdominal pain 2/2 fibroid Large palpable mass in RLQ, crosses midline, known fibroid; Negative Muir's Sign

## 2021-06-18 NOTE — H&P ADULT - PROBLEM SELECTOR PLAN 4
- Continue Clonidine patch, PO Labetalol 400mg BID, Amlodipine 10mg QD, Doxazosin 8mg QD  - DASH Diet  - Monitor vitals  -  on proper diet and exercise - Continue Clonidine patch, PO Labetalol 400mg BID, Amlodipine 10mg QD, Doxazosin 8mg QD  - DASH Diet  - Monitor BP  -  on proper diet and exercise

## 2021-06-18 NOTE — H&P ADULT - NSICDXPASTMEDICALHX_GEN_ALL_CORE_FT
There are no Wet Read(s) to document.
PAST MEDICAL HISTORY:  CHF (congestive heart failure) 2011    CVA (cerebral vascular accident) ischemic 2019    Diabetes mellitus type 2    Excessive and frequent menstruation     Fibroid     Hyperlipidemia     Hypertension

## 2021-06-18 NOTE — H&P ADULT - PROBLEM SELECTOR PLAN 5
- Avoid nephrotoxic medications, renally dose meds  - Check UA  - Monitor GFR, Creatinine, BUN  - Monitor Electrolytes, CBC - Avoid nephrotoxic medications, renally dose meds  - Check UA  - Monitor GFR, Creatinine, BUN  - Monitor Electrolytes, CBC  - Consider renal consult if worsens

## 2021-06-18 NOTE — CONSULT NOTE ADULT - PROBLEM SELECTOR RECOMMENDATION 3
- c/w Lipitor 80 mg qhs    Lilia Johnson MD   Pager # [phone]  On evenings and weekends, please call the office at [phone] or page endocrine fellow on call. Please note that this patient may be followed by different provider tomorrow. If no answer, contact the office.

## 2021-06-18 NOTE — H&P ADULT - PROBLEM SELECTOR PLAN 1
- Pt with uncontrolled T2DM, on 10units Insulin and non-compliant with Januvia 50mg. Pt found to have Glucose >800 on ED arrival, no at 78. A1c 15.2. Admitted for glucose control and monitoring  - Hold oral hypoglycemics  - Start Lantus 13 units QPM  - Start Insulin Sliding Scale, monitor Carbohydrates  - Gentle Hydration with NS  - Check Glucose FS before and after meals  - Monitor CBC, Electrolytes,   - Monitor for S&S of DKA  - Teach proper use of medications, ensure w/ teach back method  -  on proper diet and exercise - Pt with uncontrolled T2DM, on 10units Insulin and non-compliant with Januvia 50mg. Pt found to have Glucose >800 on ED arrival, no at 78. A1c 15.2. Admitted for glucose control and monitoring  - Hold oral hypoglycemics  - Start Lantus 12 units QPM  - House Endocrinology c/s following  - Start Insulin Sliding Scale, monitor Carbohydrates  - Check Glucose FS before and after meals  - Monitor CBC, Electrolytes,   - Teach proper use of medications, ensure w/ teach back method  -  on proper diet and exercise

## 2021-06-18 NOTE — H&P ADULT - ATTENDING COMMENTS
50 year old woman PMH CHF, CKD3 (baseline Cr 1.8-1.9), CVA (2019) on aspirin, HTN, HLD, menorrhagia in setting fibroids pending D&C, uncontrolled DM type 2 on insulin presents after uncontrolled FS noted on outpatient presurgical testing for D&C. Admitted with severe hyperglycemia to the 800s, in setting of severely uncontrolled DM2 with A1c of 15.2% and EMILY.     Patient reports feeling better  Euvolemic on exam    Started on Lantus 16 units qhs, Admelog 6 units TID before meals, FS achs, SSI premeals. Appreciate Endocrine recs  goal SBP<130: c/w Amlodipine, Clonidine, and Labetalol.   Can resume ARB now that Cr has downtrended back to baseline  c/w lipitor 80mg for HLD  Needs outpatient optho and podiatry followup. Obtain RD consult  No longer bleeding and stopped approximately 3 weeks ago. LMP: end of May, regular cycles, usually heavy. H/H stable  EKG with T-wave inversion Leads I, aVL but no active angina but high risk for cardiac disease in setting of uncontrolled diabetes.   No previous EKG to review. Obtain TTE. Euvolemic given hx of CHF (unclear if systolic vs diastolic)  consistent carb-DASH diet

## 2021-06-19 LAB
ALBUMIN SERPL ELPH-MCNC: 3.3 G/DL — SIGNIFICANT CHANGE UP (ref 3.3–5)
ALP SERPL-CCNC: 90 U/L — SIGNIFICANT CHANGE UP (ref 40–120)
ALT FLD-CCNC: 14 U/L — SIGNIFICANT CHANGE UP (ref 4–33)
ANION GAP SERPL CALC-SCNC: 11 MMOL/L — SIGNIFICANT CHANGE UP (ref 7–14)
AST SERPL-CCNC: 21 U/L — SIGNIFICANT CHANGE UP (ref 4–32)
BILIRUB SERPL-MCNC: 0.2 MG/DL — SIGNIFICANT CHANGE UP (ref 0.2–1.2)
BUN SERPL-MCNC: 20 MG/DL — SIGNIFICANT CHANGE UP (ref 7–23)
CALCIUM SERPL-MCNC: 9.9 MG/DL — SIGNIFICANT CHANGE UP (ref 8.4–10.5)
CHLORIDE SERPL-SCNC: 102 MMOL/L — SIGNIFICANT CHANGE UP (ref 98–107)
CO2 SERPL-SCNC: 23 MMOL/L — SIGNIFICANT CHANGE UP (ref 22–31)
COVID-19 SPIKE DOMAIN AB INTERP: POSITIVE
COVID-19 SPIKE DOMAIN ANTIBODY RESULT: 202 U/ML — HIGH
CREAT SERPL-MCNC: 1.81 MG/DL — HIGH (ref 0.5–1.3)
GLUCOSE SERPL-MCNC: 286 MG/DL — HIGH (ref 70–99)
HCT VFR BLD CALC: 39.2 % — SIGNIFICANT CHANGE UP (ref 34.5–45)
HGB BLD-MCNC: 12.6 G/DL — SIGNIFICANT CHANGE UP (ref 11.5–15.5)
MCHC RBC-ENTMCNC: 28.2 PG — SIGNIFICANT CHANGE UP (ref 27–34)
MCHC RBC-ENTMCNC: 32.1 GM/DL — SIGNIFICANT CHANGE UP (ref 32–36)
MCV RBC AUTO: 87.7 FL — SIGNIFICANT CHANGE UP (ref 80–100)
NRBC # BLD: 0 /100 WBCS — SIGNIFICANT CHANGE UP
NRBC # FLD: 0 K/UL — SIGNIFICANT CHANGE UP
PLATELET # BLD AUTO: 224 K/UL — SIGNIFICANT CHANGE UP (ref 150–400)
POTASSIUM SERPL-MCNC: 3.9 MMOL/L — SIGNIFICANT CHANGE UP (ref 3.5–5.3)
POTASSIUM SERPL-SCNC: 3.9 MMOL/L — SIGNIFICANT CHANGE UP (ref 3.5–5.3)
PROT SERPL-MCNC: 6.2 G/DL — SIGNIFICANT CHANGE UP (ref 6–8.3)
RBC # BLD: 4.47 M/UL — SIGNIFICANT CHANGE UP (ref 3.8–5.2)
RBC # FLD: 13.2 % — SIGNIFICANT CHANGE UP (ref 10.3–14.5)
SARS-COV-2 IGG+IGM SERPL QL IA: 202 U/ML — HIGH
SARS-COV-2 IGG+IGM SERPL QL IA: POSITIVE
SODIUM SERPL-SCNC: 136 MMOL/L — SIGNIFICANT CHANGE UP (ref 135–145)
WBC # BLD: 5.19 K/UL — SIGNIFICANT CHANGE UP (ref 3.8–10.5)
WBC # FLD AUTO: 5.19 K/UL — SIGNIFICANT CHANGE UP (ref 3.8–10.5)

## 2021-06-19 PROCEDURE — 99233 SBSQ HOSP IP/OBS HIGH 50: CPT

## 2021-06-19 RX ORDER — INSULIN GLARGINE 100 [IU]/ML
20 INJECTION, SOLUTION SUBCUTANEOUS AT BEDTIME
Refills: 0 | Status: DISCONTINUED | OUTPATIENT
Start: 2021-06-19 | End: 2021-06-20

## 2021-06-19 RX ORDER — INSULIN LISPRO 100/ML
7 VIAL (ML) SUBCUTANEOUS
Refills: 0 | Status: DISCONTINUED | OUTPATIENT
Start: 2021-06-19 | End: 2021-06-20

## 2021-06-19 RX ADMIN — Medication 8 MILLIGRAM(S): at 22:36

## 2021-06-19 RX ADMIN — Medication 5 UNIT(S): at 08:58

## 2021-06-19 RX ADMIN — ERGOCALCIFEROL 50000 UNIT(S): 1.25 CAPSULE ORAL at 12:19

## 2021-06-19 RX ADMIN — Medication 5 UNIT(S): at 12:21

## 2021-06-19 RX ADMIN — Medication 1: at 17:36

## 2021-06-19 RX ADMIN — Medication 2: at 09:00

## 2021-06-19 RX ADMIN — Medication 400 MILLIGRAM(S): at 17:29

## 2021-06-19 RX ADMIN — Medication 400 MILLIGRAM(S): at 05:39

## 2021-06-19 RX ADMIN — LOSARTAN POTASSIUM 25 MILLIGRAM(S): 100 TABLET, FILM COATED ORAL at 05:39

## 2021-06-19 RX ADMIN — INSULIN GLARGINE 20 UNIT(S): 100 INJECTION, SOLUTION SUBCUTANEOUS at 23:07

## 2021-06-19 RX ADMIN — ATORVASTATIN CALCIUM 80 MILLIGRAM(S): 80 TABLET, FILM COATED ORAL at 22:36

## 2021-06-19 RX ADMIN — Medication 1 PATCH: at 09:01

## 2021-06-19 RX ADMIN — Medication 1 PATCH: at 19:43

## 2021-06-19 RX ADMIN — Medication 3: at 12:22

## 2021-06-19 RX ADMIN — Medication 3: at 23:06

## 2021-06-19 RX ADMIN — AMLODIPINE BESYLATE 10 MILLIGRAM(S): 2.5 TABLET ORAL at 05:39

## 2021-06-19 RX ADMIN — Medication 81 MILLIGRAM(S): at 12:19

## 2021-06-19 RX ADMIN — Medication 1 MILLIGRAM(S): at 12:20

## 2021-06-19 RX ADMIN — Medication 7 UNIT(S): at 17:35

## 2021-06-19 NOTE — DIETITIAN INITIAL EVALUATION ADULT. - OTHER INFO
PO intake improving during hospitalization. Denies any GI issues (nausea/vomiting/diarrhea/constipation.) Denies any chewing or swallowing difficulties at this time. NKFA.     Provided pt with handout Type 2 Diabetes Nutrition Therapy. Discussed carbohydrate sources, carbohydrate portions, protein sources, mixed meals, and nutrition label reading. Encouraged increased consumption of vegetables, fiber, and healthy fats. Snack ideas provided. Informed pt of current A1c, goal A1c, and goal fingersticks.     Concerning that patient's A1c increased rapidly from 6.7 to 15.2 in 5 months. Contacted PA regarding possibility of pancreatic insufficiency? liver enzymes WNL.

## 2021-06-19 NOTE — PROGRESS NOTE ADULT - PROBLEM SELECTOR PLAN 2
- pt hypertensive, goal BP < 130/80  - on Amlodipine, Clonidine, and Labetalol - adjust as needed to achieve goal BP  - Defer medication management to primary team

## 2021-06-19 NOTE — PROGRESS NOTE ADULT - SUBJECTIVE AND OBJECTIVE BOX
CC: uncontrolled DM 2 with hyperglycemia     HPI: Patient is a 50 year old woman with PMH CHF, CVA (2019) on aspirin, HTN, HLD, fibroids, uncontrolled DM2, presents to the [facility] ED with severe hyperglycemia to the 800s. Patient was being seen at pre-surgical testing (for schedvelasquez D&C) where she was found to have a glucose level of >600, was instructed to come to the ED.  HbA1c is 15.2%.  Patient states that she was diagnosed with DM2 years ago, follows with her PCP. She is on Lantus 10 units qhs (misses 1-2 times a week) and also takes Januvia 50 mg daily. However, she states that she keeps her pills in a pill box, and it was missing the Januvia, so she hasn't taken it in weeks. Checks BG twice a day and notes that her BG has been running in the 300s and higher, sometimes her glucometer would read high. Despite this, she states that she had no idea her DM was uncontrolled, until she went to presurgical testing yesterday and was told that her BG was high. She does not have sx of neuropathy. She has not seen optho recently, no known retinopathy, has never received laser or injections to the eyes. She has CKD stage 3b. She states that a creatinine of 1.8 is normal for her. Diet wise, she does not think she has made any changes to her diet recently, although she does have an erratic eating schedule. 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     Interval History: Denies complaints, agreeable to basal/bolus insulin, reviewed medication management/hypoglycemia management     PAST MEDICAL & SURGICAL HISTORY:  Hypertension    Diabetes mellitus  type 2    Hyperlipidemia    CHF (congestive heart failure)  2011    CVA (cerebral vascular accident)  ischemic 2019    Excessive and frequent menstruation    Fibroid    S/P myomectomy  2005     delivery delivered        FAMILY HISTORY:  FH: HTN (hypertension)    DM2 in father       Social History:  no cigarette use  no alcohol use  registered nurse at [employer], works in the ED    Outpatient Medications:  · 	cloNIDine 0.2 mg/24 hr transdermal film, extended release: Last Dose Taken:  , 1 patch transdermal once a week, Sat  · 	labetalol 200 mg oral tablet: Last Dose Taken:  , 2 tab(s) orally 2 times a day  · 	rosuvastatin 20 mg oral tablet: Last Dose Taken:  , 1 tab(s) orally once a day  · 	olmesartan 5 mg oral tablet: Last Dose Taken:  , 2 tab(s) orally once a day  · 	Januvia 50 mg oral tablet: Last Dose Taken:  , 1 tab(s) orally once a day  · 	folic acid 1 mg oral tablet: Last Dose Taken:  , 2 tab(s) orally once a day  · 	amLODIPine 10 mg oral tablet: Last Dose Taken:  , 1 tab(s) orally once a day  · 	aspirin 81 mg oral tablet: Last Dose Taken:  , 1 tab(s) orally once a day  · 	doxazosin 8 mg oral tablet: Last Dose Taken:  , 1 tab(s) orally once a day (at bedtime)  · 	Vitamin D2 50,000 intl units (1.25 mg) oral capsule: Last Dose Taken:  , 1 cap(s) orally once a week on   · 	insulin glargine 100 units/mL subcutaneous solution: Last Dose Taken:  , 10 unit(s) subcutaneous once a day (at bedtime)          Allergies  No Known Allergies    Review of Systems:  Constitutional: No fever  Eyes: No blurry vision  Neuro: No tremors  HEENT: No pain  Cardiovascular: No chest pain, palpitations  Respiratory: No SOB, no cough  GI: No nausea, vomiting, abdominal pain  : No dysuria  Skin: no rash  Endocrine: + polyuria, polydipsia    ALL OTHER SYSTEMS REVIEWED AND NEGATIVE    PHYSICAL EXAM:  Vital Signs Last 24 Hrs  T(C): 36.3 (2021 05:35), Max: 36.8 (2021 20:00)  T(F): 97.4 (2021 05:35), Max: 98.3 (2021 20:00)  HR: 80 (2021 05:35) (75 - 80)  BP: 155/90 (2021 05:35) (149/80 - 164/87)  BP(mean): --  RR: 18 (2021 05:35) (17 - 18)  SpO2: 100% (2021 05:35) (100% - 100%)  GENERAL: NAD, well-developed  EYES: No proptosis, anicteric  HEENT:  Atraumatic, Normocephalic  RESPIRATORY: Unlabored respirations   GI: Soft, nontender, non distended, normal bowel sounds  PSYCH: Alert and oriented x 3, reactive affect, euthymic mood     CAPILLARY BLOOD GLUCOSE      POCT Blood Glucose.: 291 mg/dL (2021 12:11)  POCT Blood Glucose.: 231 mg/dL (2021 08:13)  POCT Blood Glucose.: 363 mg/dL (2021 21:24)  POCT Blood Glucose.: 280 mg/dL (2021 17:28)    A1C with Estimated Average Glucose Result: 15.2 % (21 @ 19:12)  A1C with Estimated Average Glucose Result: 15.8 % (21 @ 13:50)        136  |  102  |  20  ----------------------------<  286<H>  3.9   |  23  |  1.81<H>    Ca    9.9      2021 07:47  Phos  2.0       Mg     2.0         TPro  6.2  /  Alb  3.3  /  TBili  0.2  /  DBili  x   /  AST  21  /  ALT  14  /  AlkPhos  90   Chol 70 LDL -- HDL 32<L> Trig 77    Diet, DASH/TLC:   Sodium & Cholesterol Restricted  Consistent Carbohydrate No Snacks (CSTCHO)  Low Fat (LOWFAT)  No Concentrated Potassium  No Concentrated Phosphorus (21 @ 13:16) [Active]

## 2021-06-19 NOTE — DIETITIAN INITIAL EVALUATION ADULT. - PERTINENT LABORATORY DATA
06-19 Na 136 mmol/L Glu 286 mg/dL<H> K+ 3.9 mmol/L Cr 1.81 mg/dL<H> BUN 20 mg/dL Phos n/a A1C with Estimated Average Glucose Result: 15.2 % (06-17-21 @ 19:12)  A1C with Estimated Average Glucose Result: 15.8 % (06-17-21 @ 13:50)    06-19 @ 12:11 POCT 291 mg/dL  06-19 @ 08:13 POCT 231 mg/dL  06-18 @ 21:24 POCT 363 mg/dL  06-18 @ 17:28 POCT 280 mg/dL

## 2021-06-19 NOTE — PROGRESS NOTE ADULT - PROBLEM SELECTOR PLAN 3
- c/w Lipitor 80 mg qhs  - follow fasting lipid panel as outpatient     35 minutes with greater than 50% of time spent counseling / coordinating care for uncontrolled diabetes.  Medication management, glucose targets, hypoglycemia management, follow up care needed.     ANTONIETTA Bah-BC  Nurse Practitioner   Division of Endocrinology  Pager: [phone] pager [phone]    If out of hospital/unavailable when paged, please note: patient will be cared for by another provider on the endocrine service.  Please call the endocrine answering service for assistance to reach covering provider ([phone]). For non-urgent matters, please email [email or web address] for assistance.

## 2021-06-19 NOTE — DIETITIAN INITIAL EVALUATION ADULT. - ORAL INTAKE PTA/DIET HISTORY
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 Usually eats ham and cheese croissant for breakfast, salad from [employer] for lunch, skips dinner. Reports her A1c was 6.7 5 months ago. Admits to drinking sugary beverages.

## 2021-06-19 NOTE — PROGRESS NOTE ADULT - PROBLEM SELECTOR PLAN 2
- Pt scheduled for outpatient D&C for fibroid removal  - Tylenol 650mg Q6 PRN for abdominal pain  - Monitor for AUB  - Check CBCs  - consider GYN c/s - Pt scheduled for outpatient D&C for fibroid removal  - Tylenol 650mg Q6 PRN for abdominal pain  - Monitor for AUB  - Check CBCs  - outpt Gyn f/u

## 2021-06-19 NOTE — PROGRESS NOTE ADULT - SUBJECTIVE AND OBJECTIVE BOX
Patient is a 50y old  Female who presents with a chief complaint of Uncontrolled T2DM, EMILY on CKD (18 Jun 2021 11:14)      SUBJECTIVE / OVERNIGHT EVENTS:    MEDICATIONS  (STANDING):  amLODIPine   Tablet 10 milliGRAM(s) Oral daily  aspirin enteric coated 81 milliGRAM(s) Oral daily  atorvastatin 80 milliGRAM(s) Oral at bedtime  cloNIDine Patch 0.2 mG/24Hr(s) 1 patch Transdermal <User Schedule>  dextrose 40% Gel 15 Gram(s) Oral once  dextrose 5%. 1000 milliLiter(s) (50 mL/Hr) IV Continuous <Continuous>  dextrose 5%. 1000 milliLiter(s) (100 mL/Hr) IV Continuous <Continuous>  dextrose 50% Injectable 25 Gram(s) IV Push once  dextrose 50% Injectable 12.5 Gram(s) IV Push once  dextrose 50% Injectable 25 Gram(s) IV Push once  doxazosin 8 milliGRAM(s) Oral at bedtime  ergocalciferol 68584 Unit(s) Oral <User Schedule>  folic acid 1 milliGRAM(s) Oral daily  glucagon  Injectable 1 milliGRAM(s) IntraMuscular once  insulin glargine Injectable (LANTUS) 16 Unit(s) SubCutaneous at bedtime  insulin lispro (ADMELOG) corrective regimen sliding scale   SubCutaneous three times a day before meals  insulin lispro (ADMELOG) corrective regimen sliding scale   SubCutaneous at bedtime  insulin lispro Injectable (ADMELOG) 5 Unit(s) SubCutaneous three times a day before meals  labetalol 400 milliGRAM(s) Oral two times a day  losartan 25 milliGRAM(s) Oral daily    MEDICATIONS  (PRN):  acetaminophen   Tablet .. 650 milliGRAM(s) Oral every 6 hours PRN Temp greater or equal to 38C (100.4F), Mild Pain (1 - 3), Moderate Pain (4 - 6)      Vital Signs Last 24 Hrs  T(C): 36.3 (19 Jun 2021 05:35), Max: 36.8 (18 Jun 2021 20:00)  T(F): 97.4 (19 Jun 2021 05:35), Max: 98.3 (18 Jun 2021 20:00)  HR: 80 (19 Jun 2021 05:35) (75 - 85)  BP: 155/90 (19 Jun 2021 05:35) (147/80 - 164/87)  BP(mean): --  RR: 18 (19 Jun 2021 05:35) (17 - 18)  SpO2: 100% (19 Jun 2021 05:35) (100% - 100%)  CAPILLARY BLOOD GLUCOSE      POCT Blood Glucose.: 231 mg/dL (19 Jun 2021 08:13)  POCT Blood Glucose.: 363 mg/dL (18 Jun 2021 21:24)  POCT Blood Glucose.: 280 mg/dL (18 Jun 2021 17:28)  POCT Blood Glucose.: 123 mg/dL (18 Jun 2021 11:31)    I&O's Summary      PHYSICAL EXAM:  GENERAL: NAD, well-developed  HEAD:  Atraumatic, Normocephalic  EYES: EOMI, PERRLA, conjunctiva and sclera clear  NECK: Supple, No JVD  CHEST/LUNG: Clear to auscultation bilaterally; No wheeze  HEART: Regular rate and rhythm; No murmurs, rubs, or gallops  ABDOMEN: Soft, Nontender, Nondistended; Bowel sounds present  EXTREMITIES:  2+ Peripheral Pulses, No clubbing, cyanosis, or edema  PSYCH: AAOx3  NEUROLOGY: non-focal  SKIN: No rashes or lesions    LABS:                        12.6   5.19  )-----------( 224      ( 19 Jun 2021 07:47 )             39.2     06-19    136  |  102  |  20  ----------------------------<  286<H>  3.9   |  23  |  1.81<H>    Ca    9.9      19 Jun 2021 07:47  Phos  2.0     06-18  Mg     2.0     06-18    TPro  6.2  /  Alb  3.3  /  TBili  0.2  /  DBili  x   /  AST  21  /  ALT  14  /  AlkPhos  90  06-19              RADIOLOGY & ADDITIONAL TESTS:    Imaging Personally Reviewed:    Consultant(s) Notes Reviewed:      Care Discussed with Consultants/Other Providers:   Patient is a 50y old  Female who presents with a chief complaint of Uncontrolled T2DM, EMILY on CKD (18 Jun 2021 11:14)      SUBJECTIVE / OVERNIGHT EVENTS: patient seen and examined by bedside, pt denies headache, dizziness, SOB, CP, Palpitations , N/V/D, abdominal pain at this time, pt wants to go home today         MEDICATIONS  (STANDING):  amLODIPine   Tablet 10 milliGRAM(s) Oral daily  aspirin enteric coated 81 milliGRAM(s) Oral daily  atorvastatin 80 milliGRAM(s) Oral at bedtime  cloNIDine Patch 0.2 mG/24Hr(s) 1 patch Transdermal <User Schedule>  dextrose 40% Gel 15 Gram(s) Oral once  dextrose 5%. 1000 milliLiter(s) (50 mL/Hr) IV Continuous <Continuous>  dextrose 5%. 1000 milliLiter(s) (100 mL/Hr) IV Continuous <Continuous>  dextrose 50% Injectable 25 Gram(s) IV Push once  dextrose 50% Injectable 12.5 Gram(s) IV Push once  dextrose 50% Injectable 25 Gram(s) IV Push once  doxazosin 8 milliGRAM(s) Oral at bedtime  ergocalciferol 61974 Unit(s) Oral <User Schedule>  folic acid 1 milliGRAM(s) Oral daily  glucagon  Injectable 1 milliGRAM(s) IntraMuscular once  insulin glargine Injectable (LANTUS) 16 Unit(s) SubCutaneous at bedtime  insulin lispro (ADMELOG) corrective regimen sliding scale   SubCutaneous three times a day before meals  insulin lispro (ADMELOG) corrective regimen sliding scale   SubCutaneous at bedtime  insulin lispro Injectable (ADMELOG) 5 Unit(s) SubCutaneous three times a day before meals  labetalol 400 milliGRAM(s) Oral two times a day  losartan 25 milliGRAM(s) Oral daily    MEDICATIONS  (PRN):  acetaminophen   Tablet .. 650 milliGRAM(s) Oral every 6 hours PRN Temp greater or equal to 38C (100.4F), Mild Pain (1 - 3), Moderate Pain (4 - 6)      Vital Signs Last 24 Hrs  T(C): 36.3 (19 Jun 2021 05:35), Max: 36.8 (18 Jun 2021 20:00)  T(F): 97.4 (19 Jun 2021 05:35), Max: 98.3 (18 Jun 2021 20:00)  HR: 80 (19 Jun 2021 05:35) (75 - 85)  BP: 155/90 (19 Jun 2021 05:35) (147/80 - 164/87)  BP(mean): --  RR: 18 (19 Jun 2021 05:35) (17 - 18)  SpO2: 100% (19 Jun 2021 05:35) (100% - 100%)  CAPILLARY BLOOD GLUCOSE      POCT Blood Glucose.: 231 mg/dL (19 Jun 2021 08:13)  POCT Blood Glucose.: 363 mg/dL (18 Jun 2021 21:24)  POCT Blood Glucose.: 280 mg/dL (18 Jun 2021 17:28)  POCT Blood Glucose.: 123 mg/dL (18 Jun 2021 11:31)    I&O's Summary      PHYSICAL EXAM:  GENERAL: NAD, well-developed  HEAD:  Atraumatic, Normocephalic  EYES: EOMI, PERRLA, conjunctiva and sclera clear  CHEST/LUNG: Clear to auscultation bilaterally; No wheeze  HEART: Regular rate and rhythm;   ABDOMEN: Soft, Nontender, Nondistended; Bowel sounds present  EXTREMITIES:  2+ Peripheral Pulses, No clubbing, cyanosis, or edema  PSYCH: AAOx3  NEUROLOGY: non-focal  SKIN: No rashes or lesions    LABS:                        12.6   5.19  )-----------( 224      ( 19 Jun 2021 07:47 )             39.2     06-19    136  |  102  |  20  ----------------------------<  286<H>  3.9   |  23  |  1.81<H>    Ca    9.9      19 Jun 2021 07:47  Phos  2.0     06-18  Mg     2.0     06-18    TPro  6.2  /  Alb  3.3  /  TBili  0.2  /  DBili  x   /  AST  21  /  ALT  14  /  AlkPhos  90  06-19              RADIOLOGY & ADDITIONAL TESTS:    Imaging Personally Reviewed:    Consultant(s) Notes Reviewed:  Endo     Care Discussed with Consultants/Other Providers:

## 2021-06-19 NOTE — DIETITIAN INITIAL EVALUATION ADULT. - PROBLEM SELECTOR PLAN 1
- Pt with uncontrolled T2DM, on 10units Insulin and non-compliant with Januvia 50mg. Pt found to have Glucose >800 on ED arrival, no at 78. A1c 15.2. Admitted for glucose control and monitoring  - Hold oral hypoglycemics  - Start Lantus 12 units QPM  - House Endocrinology c/s following  - Start Insulin Sliding Scale, monitor Carbohydrates  - Check Glucose FS before and after meals  - Monitor CBC, Electrolytes,   - Teach proper use of medications, ensure w/ teach back method  -  on proper diet and exercise

## 2021-06-19 NOTE — PROGRESS NOTE ADULT - PROBLEM SELECTOR PLAN 3
- Continue statin  - Check lipids  -  on proper diet and exercise - Continue statin  - lipids noted , Endo recommend to c/w lipitor 80 mg QHS   -  on proper diet and exercise

## 2021-06-19 NOTE — PROGRESS NOTE ADULT - PROBLEM SELECTOR PLAN 5
- Avoid nephrotoxic medications, renally dose meds  - Check UA  - Monitor GFR, Creatinine, BUN  - Monitor Electrolytes, CBC  - Consider renal consult if worsens

## 2021-06-19 NOTE — DIETITIAN INITIAL EVALUATION ADULT. - PROBLEM SELECTOR PLAN 3
[address] THERAPY MISSED TREATMENT NOTE  [facility] ICU STEPDOWN TELEMETRY 4K  [id number]      Date: 2021  Patient Name: Tammy Diaz        CSN: [id number]   : 1968  (52 y.o.)  Gender: male   Referring Practitioner: Betzy Mcdonnell MD  Diagnosis: Fall on [address] TREATMENT: OT treatment attempted Pt. johanny recommended to hold treatment this date. Pt. Currently at dialysis, with confusion this morning and low BP's. Will attempt treatment next date. - Continue statin  - Check lipids  -  on proper diet and exercise

## 2021-06-19 NOTE — PROGRESS NOTE ADULT - PROBLEM SELECTOR PLAN 1
- Pt with uncontrolled T2DM, on 10units Insulin and non-compliant with Januvia 50mg. Pt found to have Glucose >800 on ED arrival, no at 78. A1c 15.2. Admitted for glucose control and monitoring  - Hold oral hypoglycemics  - Start Lantus 12 units QPM  - House Endocrinology c/s following  - Start Insulin Sliding Scale, monitor Carbohydrates  - Check Glucose FS before and after meals  - Monitor CBC, Electrolytes,   - Teach proper use of medications, ensure w/ teach back method  -  on proper diet and exercise - Pt with uncontrolled T2DM, on 10units Insulin and non-compliant with Januvia 50mg. Pt found to have Glucose >800 on ED arrival, no at 78. A1c 15.2. Admitted for glucose control and monitoring  - Hold oral hypoglycemics  - House Endocrinology c/s following, recommend increase Lantus to 20 units and Admelog to 7 Units AC   - Start Insulin Sliding Scale, monitor Carbohydrates  - Check Glucose FS before and after meals  - Monitor CBC, Electrolytes,   - Diabetic education , Teach proper use of medications, ensure w/ teach back method  -  on proper diet and exercise

## 2021-06-19 NOTE — DIETITIAN INITIAL EVALUATION ADULT. - PROBLEM SELECTOR PLAN 2
- Pt scheduled for outpatient D&C for fibroid removal  - Tylenol 650mg Q6 PRN for abdominal pain  - Monitor for AUB  - Check CBCs  - consider GYN c/s

## 2021-06-19 NOTE — DIETITIAN INITIAL EVALUATION ADULT. - PROBLEM SELECTOR PLAN 4
- Continue Clonidine patch, PO Labetalol 400mg BID, Amlodipine 10mg QD, Doxazosin 8mg QD  - DASH Diet  - Monitor BP  -  on proper diet and exercise

## 2021-06-19 NOTE — PROGRESS NOTE ADULT - PROBLEM SELECTOR PLAN 1
- A1c 15.2%, goal is < 7%  - discussed with pt complications of uncontrolled DM2, which include retinopathy, need for dialysis, amputations, etc  - also reviewed hypoglycemia management   - glucose target 100-180 mg/dL  - above goal with hyperglycemia   - increase Lantus to 20 units qhs  - increase Admelog to 7 units before meals (HOLD if not eating)  - low correction scale qac and qhs  - consistent carb diet  - check FS qac and qhs   - RD consult  - will follow  - for discharge: dc on basal bolus insulin (which she is agreeable to), doses TBD. She can follow up in the office if she wishes - [phone] - [address] [facility], [address], [facility].

## 2021-06-19 NOTE — DIETITIAN INITIAL EVALUATION ADULT. - PERTINENT MEDS FT
MEDICATIONS  (STANDING):  amLODIPine   Tablet 10 milliGRAM(s) Oral daily  aspirin enteric coated 81 milliGRAM(s) Oral daily  atorvastatin 80 milliGRAM(s) Oral at bedtime  cloNIDine Patch 0.2 mG/24Hr(s) 1 patch Transdermal <User Schedule>  dextrose 40% Gel 15 Gram(s) Oral once  dextrose 5%. 1000 milliLiter(s) (50 mL/Hr) IV Continuous <Continuous>  dextrose 5%. 1000 milliLiter(s) (100 mL/Hr) IV Continuous <Continuous>  dextrose 50% Injectable 25 Gram(s) IV Push once  dextrose 50% Injectable 12.5 Gram(s) IV Push once  dextrose 50% Injectable 25 Gram(s) IV Push once  doxazosin 8 milliGRAM(s) Oral at bedtime  ergocalciferol 46598 Unit(s) Oral <User Schedule>  folic acid 1 milliGRAM(s) Oral daily  glucagon  Injectable 1 milliGRAM(s) IntraMuscular once  insulin glargine Injectable (LANTUS) 20 Unit(s) SubCutaneous at bedtime  insulin lispro (ADMELOG) corrective regimen sliding scale   SubCutaneous three times a day before meals  insulin lispro (ADMELOG) corrective regimen sliding scale   SubCutaneous at bedtime  insulin lispro Injectable (ADMELOG) 7 Unit(s) SubCutaneous three times a day before meals  labetalol 400 milliGRAM(s) Oral two times a day  losartan 25 milliGRAM(s) Oral daily

## 2021-06-20 ENCOUNTER — TRANSCRIPTION ENCOUNTER (OUTPATIENT)
Age: 51
End: 2021-06-20

## 2021-06-20 VITALS
TEMPERATURE: 98 F | OXYGEN SATURATION: 100 % | SYSTOLIC BLOOD PRESSURE: 121 MMHG | RESPIRATION RATE: 18 BRPM | HEART RATE: 80 BPM | DIASTOLIC BLOOD PRESSURE: 85 MMHG

## 2021-06-20 LAB
ALBUMIN SERPL ELPH-MCNC: 3.2 G/DL — LOW (ref 3.3–5)
ALP SERPL-CCNC: 100 U/L — SIGNIFICANT CHANGE UP (ref 40–120)
ALT FLD-CCNC: 20 U/L — SIGNIFICANT CHANGE UP (ref 4–33)
ANION GAP SERPL CALC-SCNC: 12 MMOL/L — SIGNIFICANT CHANGE UP (ref 7–14)
AST SERPL-CCNC: 35 U/L — HIGH (ref 4–32)
BILIRUB SERPL-MCNC: 0.2 MG/DL — SIGNIFICANT CHANGE UP (ref 0.2–1.2)
BUN SERPL-MCNC: 17 MG/DL — SIGNIFICANT CHANGE UP (ref 7–23)
CALCIUM SERPL-MCNC: 9.7 MG/DL — SIGNIFICANT CHANGE UP (ref 8.4–10.5)
CHLORIDE SERPL-SCNC: 100 MMOL/L — SIGNIFICANT CHANGE UP (ref 98–107)
CO2 SERPL-SCNC: 22 MMOL/L — SIGNIFICANT CHANGE UP (ref 22–31)
CREAT SERPL-MCNC: 1.97 MG/DL — HIGH (ref 0.5–1.3)
GLUCOSE SERPL-MCNC: 323 MG/DL — HIGH (ref 70–99)
HCT VFR BLD CALC: 37.7 % — SIGNIFICANT CHANGE UP (ref 34.5–45)
HGB BLD-MCNC: 12 G/DL — SIGNIFICANT CHANGE UP (ref 11.5–15.5)
MCHC RBC-ENTMCNC: 28.4 PG — SIGNIFICANT CHANGE UP (ref 27–34)
MCHC RBC-ENTMCNC: 31.8 GM/DL — LOW (ref 32–36)
MCV RBC AUTO: 89.1 FL — SIGNIFICANT CHANGE UP (ref 80–100)
NRBC # BLD: 0 /100 WBCS — SIGNIFICANT CHANGE UP
NRBC # FLD: 0 K/UL — SIGNIFICANT CHANGE UP
PLATELET # BLD AUTO: 211 K/UL — SIGNIFICANT CHANGE UP (ref 150–400)
POTASSIUM SERPL-MCNC: 3.8 MMOL/L — SIGNIFICANT CHANGE UP (ref 3.5–5.3)
POTASSIUM SERPL-SCNC: 3.8 MMOL/L — SIGNIFICANT CHANGE UP (ref 3.5–5.3)
PROT SERPL-MCNC: 5.9 G/DL — LOW (ref 6–8.3)
RBC # BLD: 4.23 M/UL — SIGNIFICANT CHANGE UP (ref 3.8–5.2)
RBC # FLD: 13.2 % — SIGNIFICANT CHANGE UP (ref 10.3–14.5)
SODIUM SERPL-SCNC: 134 MMOL/L — LOW (ref 135–145)
WBC # BLD: 5.44 K/UL — SIGNIFICANT CHANGE UP (ref 3.8–10.5)
WBC # FLD AUTO: 5.44 K/UL — SIGNIFICANT CHANGE UP (ref 3.8–10.5)

## 2021-06-20 PROCEDURE — 99233 SBSQ HOSP IP/OBS HIGH 50: CPT

## 2021-06-20 PROCEDURE — 99239 HOSP IP/OBS DSCHRG MGMT >30: CPT

## 2021-06-20 RX ORDER — ROSUVASTATIN CALCIUM 5 MG/1
1 TABLET ORAL
Qty: 30 | Refills: 0
Start: 2021-06-20 | End: 2021-07-19

## 2021-06-20 RX ORDER — INSULIN GLARGINE 100 [IU]/ML
24 INJECTION, SOLUTION SUBCUTANEOUS
Qty: 5 | Refills: 0
Start: 2021-06-20 | End: 2021-07-19

## 2021-06-20 RX ORDER — INSULIN ASPART 100 [IU]/ML
7 INJECTION, SOLUTION SUBCUTANEOUS
Qty: 5 | Refills: 0
Start: 2021-06-20 | End: 2021-07-19

## 2021-06-20 RX ADMIN — Medication 7 UNIT(S): at 12:48

## 2021-06-20 RX ADMIN — LOSARTAN POTASSIUM 25 MILLIGRAM(S): 100 TABLET, FILM COATED ORAL at 05:37

## 2021-06-20 RX ADMIN — AMLODIPINE BESYLATE 10 MILLIGRAM(S): 2.5 TABLET ORAL at 05:37

## 2021-06-20 RX ADMIN — Medication 4: at 12:48

## 2021-06-20 RX ADMIN — Medication 1 PATCH: at 08:51

## 2021-06-20 RX ADMIN — Medication 400 MILLIGRAM(S): at 05:37

## 2021-06-20 RX ADMIN — Medication 1 MILLIGRAM(S): at 12:40

## 2021-06-20 RX ADMIN — Medication 81 MILLIGRAM(S): at 12:40

## 2021-06-20 RX ADMIN — Medication 3: at 08:57

## 2021-06-20 RX ADMIN — Medication 7 UNIT(S): at 08:48

## 2021-06-20 NOTE — DISCHARGE NOTE PROVIDER - HOSPITAL COURSE
51 y/o female, with a PmHx of CHF, CVA (2019) on aspirin, CKD, HTN, uterine fibroids, HLD, and T2DM, presents to the [facility] ED with uncontrolled diabetes. Found to have EMILY on CKD and uncontrolled DM with a HgbA1-c of 15.2.  Pt with uncontrolled T2DM, on 10units Insulin and non-compliant with Januvia 50mg. Pt found to have Glucose >800 on ED arrival, A1c 15.2. Admitted for glucose control and monitoring. Endocrine was consulted.    Endo c/s:  Uncontrolled type 2 diabetes mellitus with stage 3 chronic kidney disease. A1c 15.2%, goal is < 7%  - discussed with pt complications of uncontrolled DM2, which include retinopathy, need for dialysis, amputations, etc  - also reviewed hypoglycemia management   - glucose target 100-180 mg/dL  - above goal with hyperglycemia   - increase Lantus to 24 units qhs  - Continue Admelog 7 units before meals TID as this dose was recently increased (HOLD if not eating)  - low correction scale qac and low qhs scale  - consistent carb diet  - check FS qac and qhs   - RD consult completed  - Review use of insulin pen by bedside RN prior to discharge today  - for discharge:  Basaglar pen 24 units qhs  Novolog pen 7/7/7 units premeal TID  BD janusz pen needles  glucometer, test strips, lancets  reviewed DM diet, decreasing carb content in meals, choosing healthier carb options.      Discussed with Dr. Hernandez and with Gerson Bhakta, pt is cleared for discharge home today.   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   Pt states she has glucometer and supplies at home including, test strips, lancets, alcohol pads.   Novolog kwik pen and Basaglar pen covered by pt's insurance.

## 2021-06-20 NOTE — PROGRESS NOTE ADULT - TIME BILLING
counselling on diabetes, nutrition  Discussed troubleshooting if glucose is high or low at home after discharge  provide discharge recommendations to primary team

## 2021-06-20 NOTE — PROGRESS NOTE ADULT - NSPROGADDITIONALINFOA_GEN_ALL_CORE
plan of care was d/w pt and ACP pt wants to go home today, case d/w Endo by ACP, endo ok with dc home today and outpt f/u, dc recs from endo noted   Patient hemodynamically stable for discharge home  Time spent in discharge process is 50 min  plan of care was d/w pt and ACP

## 2021-06-20 NOTE — PROGRESS NOTE ADULT - PROBLEM SELECTOR PLAN 1
- Pt with uncontrolled T2DM, on 10units Insulin and non-compliant with Januvia 50mg. Pt found to have Glucose >800 on ED arrival, no at 78. A1c 15.2. Admitted for glucose control and monitoring  - Hold oral hypoglycemics  - House Endocrinology c/s following, recommend increase Lantus to 20 units and Admelog to 7 Units AC   - Start Insulin Sliding Scale, monitor Carbohydrates  - Check Glucose FS before and after meals  - Monitor CBC, Electrolytes,   - Diabetic education , Teach proper use of medications, ensure w/ teach back method  -  on proper diet and exercise - Pt with uncontrolled T2DM, on 10units Insulin and non-compliant with Januvia 50mg. Pt found to have Glucose >800 on ED arrival, no at 78. A1c 15.2. Admitted for glucose control and monitoring  - Hold oral hypoglycemics  - House Endocrinology c/s following, recommend increase Lantus to 24 units and Admelog to 7 Units AC   - Start Insulin Sliding Scale, monitor Carbohydrates  - Check Glucose FS before and after meals  - Monitor CBC, Electrolytes,   - Diabetic education , Teach proper use of medications, ensure w/ teach back method  -  on proper diet and exercise

## 2021-06-20 NOTE — PROGRESS NOTE ADULT - PROBLEM SELECTOR PLAN 2
- Pt scheduled for outpatient D&C for fibroid removal  - Tylenol 650mg Q6 PRN for abdominal pain  - Monitor for AUB  - Check CBCs  - outpt Gyn f/u

## 2021-06-20 NOTE — PROGRESS NOTE ADULT - PROBLEM SELECTOR PLAN 3
- Continue statin  - lipids noted , Endo recommend to c/w lipitor 80 mg QHS   -  on proper diet and exercise - Continue statin  - lipids noted  LDL 23 ,case d/w endo, recommend to decrease crestor to 10 mg QHS , pt advised to repeat lipid profile in 6-10 weeks   -  on proper diet and exercise

## 2021-06-20 NOTE — PROGRESS NOTE ADULT - PROBLEM SELECTOR PROBLEM 3
Hyperlipidemia, unspecified hyperlipidemia type
Hyperlipidemia, unspecified hyperlipidemia type
Hyperlipidemia
Hyperlipidemia

## 2021-06-20 NOTE — PROGRESS NOTE ADULT - ASSESSMENT
49 y/o female, with a PmHx of CHF, CVA (2019) on aspirin, CKD, HTN, uterine fibroids, HLD, and T2DM, presents to the [facility] ED with uncontrolled diabetes. Found to have EMILY on CKD and uncontrolled DM with a HgbA1-c of 15.2.
50 year old woman with PMH CHF, CVA (2019) on aspirin, HTN, HLD, fibroids, uncontrolled DM2, presents to the [facility] ED with severe hyperglycemia to the 800s, in setting of severely uncontrolled DM2 with A1c of 15.2%. BG goal 100-180 mg/dL. High risk patient with high level decision making, at high risk of worsening microvascular and macrovascular complications. 
50 year old woman with PMH CHF, CVA (2019) on aspirin, HTN, HLD, fibroids, uncontrolled DM2, presents to the [facility] ED with severe hyperglycemia to the 800s, in setting of severely uncontrolled DM2 with A1c of 15.2%. BG goal 100-180 mg/dL. Endocrine team consulted for uncontrolled diabetes. Patient is high risk with high level decision making due to uncontrolled diabetes which places patient at high risk for cardiovascular and cerebrovascular events. Patient with lability of glucose requiring close monitoring and insulin adjustments.
51 y/o female, with a PmHx of CHF, CVA (2019) on aspirin, CKD, HTN, uterine fibroids, HLD, and T2DM, presents to the [facility] ED with uncontrolled diabetes. Found to have EMILY on CKD and uncontrolled DM with a HgbA1-c of 15.2.

## 2021-06-20 NOTE — DISCHARGE NOTE NURSING/CASE MANAGEMENT/SOCIAL WORK - PATIENT PORTAL LINK FT
You can access the FollowMyHealth Patient Portal offered by [facility] by registering at the following website: [email or web address]. By joining [facility]’s FollowMyHealth portal, you will also be able to view your health information using other applications (apps) compatible with our system.

## 2021-06-20 NOTE — PROGRESS NOTE ADULT - PROBLEM SELECTOR PLAN 6
- Monitor for JVD, edema, pulmonary congestion  - Monitor fluid input/output
- Monitor for JVD, edema, pulmonary congestion  - Monitor fluid input/output

## 2021-06-20 NOTE — PROGRESS NOTE ADULT - REASON FOR ADMISSION
Uncontrolled T2DM, EMILY on CKD
Uncontrolled T2DM, MEILY on CKD
Uncontrolled T2DM, EMILY on CKD
Uncontrolled T2DM, EMILY on CKD

## 2021-06-20 NOTE — PROGRESS NOTE ADULT - SUBJECTIVE AND OBJECTIVE BOX
Patient is a 50y old  Female who presents with a chief complaint of Uncontrolled T2DM, EMILY on CKD (19 Jun 2021 13:04)      SUBJECTIVE / OVERNIGHT EVENTS:    MEDICATIONS  (STANDING):  amLODIPine   Tablet 10 milliGRAM(s) Oral daily  aspirin enteric coated 81 milliGRAM(s) Oral daily  atorvastatin 80 milliGRAM(s) Oral at bedtime  cloNIDine Patch 0.2 mG/24Hr(s) 1 patch Transdermal <User Schedule>  dextrose 40% Gel 15 Gram(s) Oral once  dextrose 5%. 1000 milliLiter(s) (50 mL/Hr) IV Continuous <Continuous>  dextrose 5%. 1000 milliLiter(s) (100 mL/Hr) IV Continuous <Continuous>  dextrose 50% Injectable 25 Gram(s) IV Push once  dextrose 50% Injectable 12.5 Gram(s) IV Push once  dextrose 50% Injectable 25 Gram(s) IV Push once  doxazosin 8 milliGRAM(s) Oral at bedtime  ergocalciferol 87216 Unit(s) Oral <User Schedule>  folic acid 1 milliGRAM(s) Oral daily  glucagon  Injectable 1 milliGRAM(s) IntraMuscular once  insulin glargine Injectable (LANTUS) 20 Unit(s) SubCutaneous at bedtime  insulin lispro (ADMELOG) corrective regimen sliding scale   SubCutaneous three times a day before meals  insulin lispro (ADMELOG) corrective regimen sliding scale   SubCutaneous at bedtime  insulin lispro Injectable (ADMELOG) 7 Unit(s) SubCutaneous three times a day before meals  labetalol 400 milliGRAM(s) Oral two times a day  losartan 25 milliGRAM(s) Oral daily    MEDICATIONS  (PRN):  acetaminophen   Tablet .. 650 milliGRAM(s) Oral every 6 hours PRN Temp greater or equal to 38C (100.4F), Mild Pain (1 - 3), Moderate Pain (4 - 6)      Vital Signs Last 24 Hrs  T(C): 36.7 (20 Jun 2021 05:35), Max: 36.9 (19 Jun 2021 13:14)  T(F): 98 (20 Jun 2021 05:35), Max: 98.4 (19 Jun 2021 13:14)  HR: 64 (20 Jun 2021 05:35) (64 - 78)  BP: 144/81 (20 Jun 2021 05:35) (130/77 - 153/82)  BP(mean): --  RR: 17 (20 Jun 2021 05:35) (17 - 18)  SpO2: 100% (20 Jun 2021 05:35) (99% - 100%)  CAPILLARY BLOOD GLUCOSE      POCT Blood Glucose.: 277 mg/dL (20 Jun 2021 08:11)  POCT Blood Glucose.: 386 mg/dL (19 Jun 2021 22:15)  POCT Blood Glucose.: 154 mg/dL (19 Jun 2021 17:22)  POCT Blood Glucose.: 291 mg/dL (19 Jun 2021 12:11)    I&O's Summary      PHYSICAL EXAM:  GENERAL: NAD, well-developed  HEAD:  Atraumatic, Normocephalic  EYES: EOMI, PERRLA, conjunctiva and sclera clear  NECK: Supple, No JVD  CHEST/LUNG: Clear to auscultation bilaterally; No wheeze  HEART: Regular rate and rhythm; No murmurs, rubs, or gallops  ABDOMEN: Soft, Nontender, Nondistended; Bowel sounds present  EXTREMITIES:  2+ Peripheral Pulses, No clubbing, cyanosis, or edema  PSYCH: AAOx3  NEUROLOGY: non-focal  SKIN: No rashes or lesions    LABS:                        12.0   5.44  )-----------( 211      ( 20 Jun 2021 08:11 )             37.7     06-20    134<L>  |  100  |  17  ----------------------------<  323<H>  3.8   |  22  |  1.97<H>    Ca    9.7      20 Jun 2021 08:11    TPro  5.9<L>  /  Alb  3.2<L>  /  TBili  0.2  /  DBili  x   /  AST  35<H>  /  ALT  20  /  AlkPhos  100  06-20              RADIOLOGY & ADDITIONAL TESTS:    Imaging Personally Reviewed:    Consultant(s) Notes Reviewed:      Care Discussed with Consultants/Other Providers:   Patient is a 50y old  Female who presents with a chief complaint of Uncontrolled T2DM, EMILY on CKD (19 Jun 2021 13:04)      SUBJECTIVE / OVERNIGHT EVENTS: patient seen and examined by bedside, feeling better, denies headache, dizziness, SOB, CP, Palpitations , N/V/D, abdominal pain  pt wants to go home today       MEDICATIONS  (STANDING):  amLODIPine   Tablet 10 milliGRAM(s) Oral daily  aspirin enteric coated 81 milliGRAM(s) Oral daily  atorvastatin 80 milliGRAM(s) Oral at bedtime  cloNIDine Patch 0.2 mG/24Hr(s) 1 patch Transdermal <User Schedule>  dextrose 40% Gel 15 Gram(s) Oral once  dextrose 5%. 1000 milliLiter(s) (50 mL/Hr) IV Continuous <Continuous>  dextrose 5%. 1000 milliLiter(s) (100 mL/Hr) IV Continuous <Continuous>  dextrose 50% Injectable 25 Gram(s) IV Push once  dextrose 50% Injectable 12.5 Gram(s) IV Push once  dextrose 50% Injectable 25 Gram(s) IV Push once  doxazosin 8 milliGRAM(s) Oral at bedtime  ergocalciferol 00572 Unit(s) Oral <User Schedule>  folic acid 1 milliGRAM(s) Oral daily  glucagon  Injectable 1 milliGRAM(s) IntraMuscular once  insulin glargine Injectable (LANTUS) 20 Unit(s) SubCutaneous at bedtime  insulin lispro (ADMELOG) corrective regimen sliding scale   SubCutaneous three times a day before meals  insulin lispro (ADMELOG) corrective regimen sliding scale   SubCutaneous at bedtime  insulin lispro Injectable (ADMELOG) 7 Unit(s) SubCutaneous three times a day before meals  labetalol 400 milliGRAM(s) Oral two times a day  losartan 25 milliGRAM(s) Oral daily    MEDICATIONS  (PRN):  acetaminophen   Tablet .. 650 milliGRAM(s) Oral every 6 hours PRN Temp greater or equal to 38C (100.4F), Mild Pain (1 - 3), Moderate Pain (4 - 6)      Vital Signs Last 24 Hrs  T(C): 36.7 (20 Jun 2021 05:35), Max: 36.9 (19 Jun 2021 13:14)  T(F): 98 (20 Jun 2021 05:35), Max: 98.4 (19 Jun 2021 13:14)  HR: 64 (20 Jun 2021 05:35) (64 - 78)  BP: 144/81 (20 Jun 2021 05:35) (130/77 - 153/82)  BP(mean): --  RR: 17 (20 Jun 2021 05:35) (17 - 18)  SpO2: 100% (20 Jun 2021 05:35) (99% - 100%)  CAPILLARY BLOOD GLUCOSE      POCT Blood Glucose.: 277 mg/dL (20 Jun 2021 08:11)  POCT Blood Glucose.: 386 mg/dL (19 Jun 2021 22:15)  POCT Blood Glucose.: 154 mg/dL (19 Jun 2021 17:22)  POCT Blood Glucose.: 291 mg/dL (19 Jun 2021 12:11)    I&O's Summary    PHYSICAL EXAM:  GENERAL: NAD, well-developed  HEAD:  Atraumatic, Normocephalic  EYES: EOMI, PERRLA, conjunctiva and sclera clear  NECK: Supple, No JVD  CHEST/LUNG: Clear to auscultation bilaterally; No wheeze  HEART: Regular rate and rhythm; No murmurs, rubs, or gallops  ABDOMEN: Soft, Nontender, Nondistended; Bowel sounds present  EXTREMITIES:  2+ Peripheral Pulses, No clubbing, cyanosis, or edema  PSYCH: AAOx3  NEUROLOGY: non-focal  SKIN: No rashes or lesions        LABS:                        12.0   5.44  )-----------( 211      ( 20 Jun 2021 08:11 )             37.7     06-20    134<L>  |  100  |  17  ----------------------------<  323<H>  3.8   |  22  |  1.97<H>    Ca    9.7      20 Jun 2021 08:11    TPro  5.9<L>  /  Alb  3.2<L>  /  TBili  0.2  /  DBili  x   /  AST  35<H>  /  ALT  20  /  AlkPhos  100  06-20              RADIOLOGY & ADDITIONAL TESTS:    Imaging Personally Reviewed:    Consultant(s) Notes Reviewed:  Endo     Care Discussed with Consultants/Other Providers:

## 2021-06-20 NOTE — PROGRESS NOTE ADULT - PROBLEM SELECTOR PLAN 8
- hold dvt chemical prophylaxis due to fibroid bleeding; will order HAL stockings for now
- hold dvt chemical prophylaxis due to fibroid bleeding; will order HAL stockings for now

## 2021-06-20 NOTE — DISCHARGE NOTE PROVIDER - NSDCCPCAREPLAN_GEN_ALL_CORE_FT
PRINCIPAL DISCHARGE DIAGNOSIS  Diagnosis: Hyperglycemia due to diabetes mellitus  Assessment and Plan of Treatment: A1C 15.8, Goal <7,  glucose target 100-180 mg/dL  7 units Humalog Kwik pen (short acting insulin) to be taken 3xday before meals - hold insluin if skipping meal  24 units Basalglar kwik pen (long acting insulin) at bedtime   Monitor finger sticks pre-meal and bedtime, low salt, fat and carbohydrate diet, minimize glucose intake. Bring FS log with you to follow up appointment. Exercise daily for at least 30 minutes and weight loss.  Follow up with primary care physician and endocrinologist for routine Hemoglobin A1C checks and management.  Follow up with your ophthalmologist for routine yearly vision exams.   Please check you fingersticks every morning, or if you are not feeling well and before meals.  If your fingerstick is >300 x 2 or more readings -Please contact primary doctor or endocrinologist.  If your fingerstick is less than 70 and/or you have symptoms of very low blood sugar, FIRST drink 1/2 cup of apple juice (or take 4 glucose tabs) and recheck fingerstick in 15 minutes. Repeat these steps until blood sugar is above 100, if necessary then call your doctor to discuss low blood sugar.  Follow up in Endo clinic      SECONDARY DISCHARGE DIAGNOSES  Diagnosis: Chronic kidney disease (CKD)  Assessment and Plan of Treatment: Continue blood pressure, cholesterol and diabetic medications. Goal of hemoglobin A1C (HgbA1C) < 7%.  Avoid nephrotoxic drugs such as nonsteroidal anti-inflammatory agents (NSAIDs).   Please follow up with your nephrologist to monitor your kidney function, continue with low protein and potassium diet.    
None

## 2021-06-20 NOTE — PROGRESS NOTE ADULT - SUBJECTIVE AND OBJECTIVE BOX
Chief Complaint: DM2 uncontrolled    History: Glucose still elevated but overall improved from admission and patient would like to be discharged today.  No hypoglycemia events.  Tolerating po (eating white rice at time of encounter)    MEDICATIONS  (STANDING):  amLODIPine   Tablet 10 milliGRAM(s) Oral daily  aspirin enteric coated 81 milliGRAM(s) Oral daily  atorvastatin 80 milliGRAM(s) Oral at bedtime  cloNIDine Patch 0.2 mG/24Hr(s) 1 patch Transdermal <User Schedule>  dextrose 40% Gel 15 Gram(s) Oral once  dextrose 5%. 1000 milliLiter(s) (50 mL/Hr) IV Continuous <Continuous>  dextrose 5%. 1000 milliLiter(s) (100 mL/Hr) IV Continuous <Continuous>  dextrose 50% Injectable 25 Gram(s) IV Push once  dextrose 50% Injectable 12.5 Gram(s) IV Push once  dextrose 50% Injectable 25 Gram(s) IV Push once  doxazosin 8 milliGRAM(s) Oral at bedtime  ergocalciferol 30010 Unit(s) Oral <User Schedule>  folic acid 1 milliGRAM(s) Oral daily  glucagon  Injectable 1 milliGRAM(s) IntraMuscular once  insulin glargine Injectable (LANTUS) 20 Unit(s) SubCutaneous at bedtime  insulin lispro (ADMELOG) corrective regimen sliding scale   SubCutaneous three times a day before meals  insulin lispro (ADMELOG) corrective regimen sliding scale   SubCutaneous at bedtime  insulin lispro Injectable (ADMELOG) 7 Unit(s) SubCutaneous three times a day before meals  labetalol 400 milliGRAM(s) Oral two times a day  losartan 25 milliGRAM(s) Oral daily    MEDICATIONS  (PRN):  acetaminophen   Tablet .. 650 milliGRAM(s) Oral every 6 hours PRN Temp greater or equal to 38C (100.4F), Mild Pain (1 - 3), Moderate Pain (4 - 6)      Allergies    No Known Allergies    Intolerances      Review of Systems:  ALL OTHER SYSTEMS REVIEWED AND NEGATIVE      PHYSICAL EXAM:  VITALS: T(C): 36.8 (06-20-21 @ 13:32)  T(F): 98.3 (06-20-21 @ 13:32), Max: 98.3 (06-20-21 @ 13:32)  HR: 80 (06-20-21 @ 13:32) (64 - 80)  BP: 121/85 (06-20-21 @ 13:32) (121/85 - 153/82)  RR:  (17 - 18)  SpO2:  (100% - 100%)  Wt(kg): --  GENERAL: NAD, well-groomed, well-developed  EYES: No proptosis, no lid lag, anicteric  HEENT:  Atraumatic, Normocephalic, moist mucous membranes  RESPIRATORY: nonlabored respirations, no wheezing  PSYCH: Alert and oriented x 3, normal affect, normal mood    CAPILLARY BLOOD GLUCOSE      POCT Blood Glucose.: 331 mg/dL (20 Jun 2021 12:07)  POCT Blood Glucose.: 277 mg/dL (20 Jun 2021 08:11)  POCT Blood Glucose.: 386 mg/dL (19 Jun 2021 22:15)  POCT Blood Glucose.: 154 mg/dL (19 Jun 2021 17:22)      06-20    134<L>  |  100  |  17  ----------------------------<  323<H>  3.8   |  22  |  1.97<H>    EGFR if : 33<L>  EGFR if non : 29<L>    Ca    9.7      06-20  Mg     2.0     06-18  Phos  3.0     06-20    TPro  5.9<L>  /  Alb  3.2<L>  /  TBili  0.2  /  DBili  x   /  AST  35<H>  /  ALT  20  /  AlkPhos  100  06-20      A1C with Estimated Average Glucose Result: 15.2 % (06-17-21 @ 19:12)  A1C with Estimated Average Glucose Result: 15.8 % (06-17-21 @ 13:50)      Thyroid Function Tests:  06-18 @ 08:27 TSH 1.13 FreeT4 -- T3 -- Anti TPO -- Anti Thyroglobulin Ab -- TSI --

## 2021-06-20 NOTE — PROGRESS NOTE ADULT - PROBLEM SELECTOR PLAN 5
- Avoid nephrotoxic medications, renally dose meds  - Check UA  - Monitor GFR, Creatinine, BUN  - Monitor Electrolytes, CBC  - Consider renal consult if worsens - Avoid nephrotoxic medications, renally dose meds  - Monitor GFR, Creatinine, BUN  - Monitor Electrolytes, CBC  - cr at baseline as per pt   -outpt f/u with PMD/Renal,  -pt counselled about close monitoring and optimum control of HTN and DM for worsening of CKD

## 2021-06-20 NOTE — DISCHARGE NOTE PROVIDER - NSDCFUADDAPPT_GEN_ALL_CORE_FT
Follow up with your primary care physician for further monitoring in 1-2 weeks. Please call to arrange appointment.     Endocrinology follow up  [phone] - [address], [address], [facility].

## 2021-06-20 NOTE — PROGRESS NOTE ADULT - PROBLEM SELECTOR PLAN 3
LDL overly controlled 23  can reduce home rosuvastatin 20mg to 10mg daily  goal LDL < 70    Katharine Lagunas MD  Division of Endocrinology  Pager: [phone]    If after 6PM or before 9AM, or on weekends/holidays, please call endocrine answering service for assistance ([phone]).  For nonurgent matters email [email or web address] for assistance.

## 2021-06-20 NOTE — DISCHARGE NOTE PROVIDER - NSDCMRMEDTOKEN_GEN_ALL_CORE_FT
amLODIPine 10 mg oral tablet: 1 tab(s) orally once a day  aspirin 81 mg oral tablet: 1 tab(s) orally once a day  Basaglar KwikPen 100 units/mL subcutaneous solution: 24 unit(s) subcutaneous once a day (at bedtime)   cloNIDine 0.2 mg/24 hr transdermal film, extended release: 1 patch transdermal once a week, Sat  doxazosin 8 mg oral tablet: 1 tab(s) orally once a day (at bedtime)  folic acid 1 mg oral tablet: 2 tab(s) orally once a day  Insulin Pen Needles, 4mm: 1 application subcutaneously 4 times a day. ** Use with insulin pen **   labetalol 200 mg oral tablet: 2 tab(s) orally 2 times a day  NovoLOG FlexPen 100 units/mL injectable solution: 7 unit(s) injectable 3 times a day (before meals)   olmesartan 5 mg oral tablet: 2 tab(s) orally once a day  rosuvastatin 10 mg oral capsule: 1 cap(s) orally once a day (at bedtime)   Vitamin D2 50,000 intl units (1.25 mg) oral capsule: 1 cap(s) orally once a week on Saturdays

## 2021-06-20 NOTE — PROGRESS NOTE ADULT - PROBLEM SELECTOR PROBLEM 1
Uncontrolled type 2 diabetes mellitus with stage 3 chronic kidney disease
Uncontrolled type 2 diabetes mellitus with stage 3 chronic kidney disease
Hyperglycemia due to diabetes mellitus
Hyperglycemia due to diabetes mellitus

## 2021-06-20 NOTE — DISCHARGE NOTE NURSING/CASE MANAGEMENT/SOCIAL WORK - NSDCFUADDAPPT_GEN_ALL_CORE_FT
Follow up with your primary care physician for further monitoring in 1-2 weeks. Please call to arrange appointment.     Endocrinology follow up  [phone] - [address], [address], [facility].

## 2021-06-20 NOTE — PROGRESS NOTE ADULT - PROBLEM SELECTOR PLAN 1
- A1c 15.2%, goal is < 7%  - discussed with pt complications of uncontrolled DM2, which include retinopathy, need for dialysis, amputations, etc  - also reviewed hypoglycemia management   - glucose target 100-180 mg/dL  - above goal with hyperglycemia   - increase Lantus to 24 units qhs  - Continue Admelog 7 units before meals TID as this dose was recently increased (HOLD if not eating)  - low correction scale qac and low qhs scale  - consistent carb diet  - check FS qac and qhs   - RD consult completed  - Review use of insulin pen by bedside RN prior to discharge today  - for discharge:  Basaglar pen 24 units qhs  Novolog pen 7/7/7 units premeal TID  BD janusz pen needles  glucometer, test strips, lancets  reviewed DM diet, decreasing carb content in meals, choosing healthier carb options.  Endocrinology follow up  [phone] - [address], [address], [facility].

## 2021-06-20 NOTE — PROGRESS NOTE ADULT - PROBLEM SELECTOR PLAN 4
- Continue Clonidine patch, PO Labetalol 400mg BID, Amlodipine 10mg QD, Doxazosin 8mg QD  - DASH Diet  - Monitor BP  -  on proper diet and exercise
- Continue Clonidine patch, PO Labetalol 400mg BID, Amlodipine 10mg QD, Doxazosin 8mg QD  - DASH Diet  - Monitor BP  -  on proper diet and exercise

## 2021-06-21 PROBLEM — Z00.00 ENCOUNTER FOR PREVENTIVE HEALTH EXAMINATION: Noted: 2021-06-21

## 2021-06-30 RX ORDER — ERGOCALCIFEROL 1.25 MG/1
1 CAPSULE ORAL
Qty: 0 | Refills: 0 | DISCHARGE

## 2021-06-30 RX ORDER — INSULIN GLARGINE 100 [IU]/ML
10 INJECTION, SOLUTION SUBCUTANEOUS
Qty: 0 | Refills: 0 | DISCHARGE

## 2021-06-30 RX ORDER — SITAGLIPTIN 50 MG/1
1 TABLET, FILM COATED ORAL
Qty: 0 | Refills: 0 | DISCHARGE

## 2021-06-30 RX ORDER — ROSUVASTATIN CALCIUM 5 MG/1
1 TABLET ORAL
Qty: 0 | Refills: 0 | DISCHARGE

## 2021-06-30 RX ORDER — DOXAZOSIN MESYLATE 4 MG
1 TABLET ORAL
Qty: 0 | Refills: 0 | DISCHARGE

## 2021-07-19 ENCOUNTER — APPOINTMENT (OUTPATIENT)
Dept: ENDOCRINOLOGY | Facility: CLINIC | Age: 51
End: 2021-07-19
Payer: COMMERCIAL

## 2021-07-19 ENCOUNTER — RESULT REVIEW (OUTPATIENT)
Age: 51
End: 2021-07-19

## 2021-07-19 VITALS — HEIGHT: 64 IN | BODY MASS INDEX: 31.58 KG/M2 | WEIGHT: 185 LBS

## 2021-07-19 DIAGNOSIS — E11.9 TYPE 2 DIABETES MELLITUS W/OUT COMPLICATIONS: ICD-10-CM

## 2021-07-19 PROBLEM — I63.9 CEREBRAL INFARCTION, UNSPECIFIED: Chronic | Status: ACTIVE | Noted: 2021-06-17

## 2021-07-19 PROBLEM — I50.9 HEART FAILURE, UNSPECIFIED: Chronic | Status: ACTIVE | Noted: 2021-06-17

## 2021-07-19 PROBLEM — N92.0 EXCESSIVE AND FREQUENT MENSTRUATION WITH REGULAR CYCLE: Chronic | Status: ACTIVE | Noted: 2021-06-17

## 2021-07-19 PROBLEM — E78.5 HYPERLIPIDEMIA, UNSPECIFIED: Chronic | Status: ACTIVE | Noted: 2021-06-17

## 2021-07-19 PROBLEM — D21.9 BENIGN NEOPLASM OF CONNECTIVE AND OTHER SOFT TISSUE, UNSPECIFIED: Chronic | Status: ACTIVE | Noted: 2021-06-18

## 2021-07-19 PROBLEM — I10 ESSENTIAL (PRIMARY) HYPERTENSION: Chronic | Status: ACTIVE | Noted: 2021-06-17

## 2021-07-19 PROCEDURE — 99072 ADDL SUPL MATRL&STAF TM PHE: CPT

## 2021-07-19 PROCEDURE — G0108 DIAB MANAGE TRN  PER INDIV: CPT

## 2021-08-02 NOTE — PATIENT PROFILE ADULT - BILL PAYMENT
Is This A New Presentation, Or A Follow-Up?: Skin Lesions How Severe Is Your Skin Lesion?: mild Have Your Skin Lesions Been Treated?: not been treated no

## 2021-09-07 ENCOUNTER — FORM ENCOUNTER (OUTPATIENT)
Age: 51
End: 2021-09-07

## 2021-09-08 ENCOUNTER — FORM ENCOUNTER (OUTPATIENT)
Age: 51
End: 2021-09-08

## 2021-09-08 ENCOUNTER — APPOINTMENT (OUTPATIENT)
Dept: OBGYN | Facility: CLINIC | Age: 51
End: 2021-09-08
Payer: COMMERCIAL

## 2021-09-08 DIAGNOSIS — D25.9 LEIOMYOMA OF UTERUS, UNSPECIFIED: ICD-10-CM

## 2021-09-08 DIAGNOSIS — K42.9 UMBILICAL HERNIA W/OUT OBSTRUCTION OR GANGRENE: ICD-10-CM

## 2021-09-08 PROCEDURE — 99203 OFFICE O/P NEW LOW 30 MIN: CPT

## 2021-09-09 ENCOUNTER — FORM ENCOUNTER (OUTPATIENT)
Age: 51
End: 2021-09-09

## 2021-09-12 ENCOUNTER — FORM ENCOUNTER (OUTPATIENT)
Age: 51
End: 2021-09-12

## 2021-09-16 ENCOUNTER — FORM ENCOUNTER (OUTPATIENT)
Age: 51
End: 2021-09-16

## 2021-09-19 ENCOUNTER — FORM ENCOUNTER (OUTPATIENT)
Age: 51
End: 2021-09-19

## 2021-09-28 ENCOUNTER — RESULT REVIEW (OUTPATIENT)
Age: 51
End: 2021-09-28

## 2021-09-28 ENCOUNTER — OUTPATIENT (OUTPATIENT)
Dept: OUTPATIENT SERVICES | Facility: HOSPITAL | Age: 51
LOS: 1 days | End: 2021-09-28
Payer: COMMERCIAL

## 2021-09-28 ENCOUNTER — FORM ENCOUNTER (OUTPATIENT)
Age: 51
End: 2021-09-28

## 2021-09-28 ENCOUNTER — APPOINTMENT (OUTPATIENT)
Dept: ULTRASOUND IMAGING | Facility: IMAGING CENTER | Age: 51
End: 2021-09-28
Payer: COMMERCIAL

## 2021-09-28 DIAGNOSIS — O03.9 COMPLETE OR UNSPECIFIED SPONTANEOUS ABORTION WITHOUT COMPLICATION: Chronic | ICD-10-CM

## 2021-09-28 DIAGNOSIS — Z00.8 ENCOUNTER FOR OTHER GENERAL EXAMINATION: ICD-10-CM

## 2021-09-28 DIAGNOSIS — Z33.2 ENCOUNTER FOR ELECTIVE TERMINATION OF PREGNANCY: Chronic | ICD-10-CM

## 2021-09-28 DIAGNOSIS — D25.9 LEIOMYOMA OF UTERUS, UNSPECIFIED: Chronic | ICD-10-CM

## 2021-09-28 DIAGNOSIS — Z98.890 OTHER SPECIFIED POSTPROCEDURAL STATES: Chronic | ICD-10-CM

## 2021-09-28 PROCEDURE — 76830 TRANSVAGINAL US NON-OB: CPT | Mod: 26

## 2021-09-28 PROCEDURE — 76856 US EXAM PELVIC COMPLETE: CPT | Mod: 26

## 2021-09-28 PROCEDURE — 76856 US EXAM PELVIC COMPLETE: CPT

## 2021-09-28 PROCEDURE — 76830 TRANSVAGINAL US NON-OB: CPT

## 2021-10-29 ENCOUNTER — APPOINTMENT (OUTPATIENT)
Dept: ENDOCRINOLOGY | Facility: CLINIC | Age: 51
End: 2021-10-29
Payer: COMMERCIAL

## 2021-10-29 VITALS — BODY MASS INDEX: 34.49 KG/M2 | HEIGHT: 64 IN | WEIGHT: 202 LBS

## 2021-10-29 PROCEDURE — G0108 DIAB MANAGE TRN  PER INDIV: CPT

## 2022-02-11 ENCOUNTER — NON-APPOINTMENT (OUTPATIENT)
Age: 52
End: 2022-02-11

## 2022-02-11 PROBLEM — D25.9 UTERINE LEIOMYOMA, UNSPECIFIED LOCATION: Status: ACTIVE | Noted: 2020-03-11

## 2022-02-11 PROBLEM — K42.9 UMBILICAL HERNIA: Status: ACTIVE | Noted: 2022-02-11

## 2022-03-15 ENCOUNTER — APPOINTMENT (OUTPATIENT)
Dept: MRI IMAGING | Facility: IMAGING CENTER | Age: 52
End: 2022-03-15
Payer: COMMERCIAL

## 2022-03-15 ENCOUNTER — OUTPATIENT (OUTPATIENT)
Dept: OUTPATIENT SERVICES | Facility: HOSPITAL | Age: 52
LOS: 1 days | End: 2022-03-15
Payer: COMMERCIAL

## 2022-03-15 DIAGNOSIS — Z33.2 ENCOUNTER FOR ELECTIVE TERMINATION OF PREGNANCY: Chronic | ICD-10-CM

## 2022-03-15 DIAGNOSIS — D25.9 LEIOMYOMA OF UTERUS, UNSPECIFIED: ICD-10-CM

## 2022-03-15 DIAGNOSIS — D25.9 LEIOMYOMA OF UTERUS, UNSPECIFIED: Chronic | ICD-10-CM

## 2022-03-15 DIAGNOSIS — O03.9 COMPLETE OR UNSPECIFIED SPONTANEOUS ABORTION WITHOUT COMPLICATION: Chronic | ICD-10-CM

## 2022-03-15 DIAGNOSIS — Z98.890 OTHER SPECIFIED POSTPROCEDURAL STATES: Chronic | ICD-10-CM

## 2022-03-15 PROCEDURE — 74183 MRI ABD W/O CNTR FLWD CNTR: CPT | Mod: 26

## 2022-03-15 PROCEDURE — 74183 MRI ABD W/O CNTR FLWD CNTR: CPT

## 2022-03-15 PROCEDURE — 72197 MRI PELVIS W/O & W/DYE: CPT

## 2022-03-15 PROCEDURE — 72197 MRI PELVIS W/O & W/DYE: CPT | Mod: 26

## 2022-03-15 PROCEDURE — A9585: CPT

## 2022-04-11 DIAGNOSIS — Z82.49 FAMILY HISTORY OF ISCHEMIC HEART DISEASE AND OTHER DISEASES OF THE CIRCULATORY SYSTEM: ICD-10-CM

## 2022-04-11 DIAGNOSIS — Z81.8 FAMILY HISTORY OF OTHER MENTAL AND BEHAVIORAL DISORDERS: ICD-10-CM

## 2022-04-11 DIAGNOSIS — Z83.3 FAMILY HISTORY OF DIABETES MELLITUS: ICD-10-CM

## 2022-04-11 DIAGNOSIS — Z80.3 FAMILY HISTORY OF MALIGNANT NEOPLASM OF BREAST: ICD-10-CM

## 2022-04-11 DIAGNOSIS — Z80.49 FAMILY HISTORY OF MALIGNANT NEOPLASM OF OTHER GENITAL ORGANS: ICD-10-CM

## 2022-04-28 RX ORDER — ELECTROLYTES/DEXTROSE
32G X 4 MM SOLUTION, ORAL ORAL
Qty: 4 | Refills: 1 | Status: ACTIVE | COMMUNITY
Start: 2021-07-19

## 2022-05-11 ENCOUNTER — APPOINTMENT (OUTPATIENT)
Dept: OBGYN | Facility: CLINIC | Age: 52
End: 2022-05-11

## 2022-05-19 ENCOUNTER — LABORATORY RESULT (OUTPATIENT)
Age: 52
End: 2022-05-19

## 2022-05-19 ENCOUNTER — APPOINTMENT (OUTPATIENT)
Dept: OBGYN | Facility: CLINIC | Age: 52
End: 2022-05-19
Payer: COMMERCIAL

## 2022-05-19 VITALS
HEART RATE: 81 BPM | HEIGHT: 64 IN | DIASTOLIC BLOOD PRESSURE: 99 MMHG | BODY MASS INDEX: 31.76 KG/M2 | WEIGHT: 186 LBS | SYSTOLIC BLOOD PRESSURE: 160 MMHG

## 2022-05-19 PROCEDURE — 99213 OFFICE O/P EST LOW 20 MIN: CPT

## 2022-05-20 ENCOUNTER — NON-APPOINTMENT (OUTPATIENT)
Age: 52
End: 2022-05-20

## 2022-05-20 LAB
BASOPHILS # BLD AUTO: 0.02 K/UL
BASOPHILS NFR BLD AUTO: 0.4 %
EOSINOPHIL # BLD AUTO: 0.24 K/UL
EOSINOPHIL NFR BLD AUTO: 4.8 %
ESTRADIOL SERPL-MCNC: 24 PG/ML
FSH SERPL-MCNC: 111 IU/L
HCT VFR BLD CALC: 40.2 %
HGB BLD-MCNC: 12.7 G/DL
IMM GRANULOCYTES NFR BLD AUTO: 0.6 %
LH SERPL-ACNC: 101 IU/L
LYMPHOCYTES # BLD AUTO: 1.54 K/UL
LYMPHOCYTES NFR BLD AUTO: 30.7 %
MAN DIFF?: NORMAL
MCHC RBC-ENTMCNC: 28.6 PG
MCHC RBC-ENTMCNC: 31.6 GM/DL
MCV RBC AUTO: 90.5 FL
MONOCYTES # BLD AUTO: 0.62 K/UL
MONOCYTES NFR BLD AUTO: 12.4 %
NEUTROPHILS # BLD AUTO: 2.57 K/UL
NEUTROPHILS NFR BLD AUTO: 51.1 %
PLATELET # BLD AUTO: 255 K/UL
RBC # BLD: 4.44 M/UL
RBC # FLD: 12.4 %
TSH SERPL-ACNC: 1.31 UIU/ML
WBC # FLD AUTO: 5.02 K/UL

## 2022-05-25 LAB
CANDIDA VAG CYTO: NOT DETECTED
G VAGINALIS+PREV SP MTYP VAG QL MICRO: NOT DETECTED
T VAGINALIS VAG QL WET PREP: NOT DETECTED

## 2022-05-25 NOTE — HISTORY OF PRESENT ILLNESS
[FreeTextEntry1] : 50 yo, she presents today to discuss MRI results. She reports that she is still bleeding. In comparison to Sept 2021 pelvic sono which showed 7 cm fibroid in lower uterus, 5.7 cm fibroid at top of uterus, 4.7 cm at top of uterus in uterine wall. Pelvic MRI 3/2022 revealed that the 4.7 cm fibroid has moved to her cervix. Pt had endo bx with Dr. Fiore, reportedly last year.

## 2022-05-25 NOTE — PHYSICAL EXAM
[Appropriately responsive] : appropriately responsive [Alert] : alert [No Acute Distress] : no acute distress [No Lymphadenopathy] : no lymphadenopathy [Regular Rate Rhythm] : regular rate rhythm [No Murmurs] : no murmurs [Clear to Auscultation B/L] : clear to auscultation bilaterally [Soft] : soft [Non-tender] : non-tender [Non-distended] : non-distended [No HSM] : No HSM [No Lesions] : no lesions [No Mass] : no mass [Oriented x3] : oriented x3 [Labia Majora] : normal [Labia Minora] : normal [Discharge] : discharge [Normal] : normal [Uterine Adnexae] : normal [FreeTextEntry7] : dark spot in lower abdominal wall visualized -- correlates with scar tissue seen on MRI [Enlarged ___ wks] : enlarged [unfilled] ~Uweeks [FreeTextEntry5] : dilated cervix and prolapsing fibroid visualized

## 2022-05-25 NOTE — END OF VISIT
[FreeTextEntry3] : I, Huamstoney Mckay, acted as a scribe on behalf of Dr. Soledad Rascon on 05/19/2022. \par \par All medical entries made by the scribe where at my, Dr. Soledad Rascon, direction and personally dictated by me on 05/19/2022. I have reviewed the chart and agree that the record accurately reflects my personal performance of the history, physical exam, assessment, and plan. I have also personally directed, reviewed and agreed with the chart.\par

## 2022-05-25 NOTE — PLAN
[FreeTextEntry1] : 52 yo, fibroid uterus, umbilical hernia.\par \par Discussed the option of continued conservative observation of fibroids vs surgical removal. Treatment options of myomectomy, hysterectomy, ufe and continued observation were also outlined. A detailed discussion regarding the option of myomectomy vs hysterectomy was also held and the risks, benefits, and alternatives provided. All questions answered and pt to notify. \par \par -her son is home from [facility] until August to take care of pt post procedure, put on waitlist for cancellations to make sooner\par -pt to wait 6 weeks before returning to work\par -book [facility] BS\par -bloodwork to check hormones\par \par Umbilical hernia\par -fine needle aspiration to be done for hernia, pt against it,  to meet with Dr. Sheppard for consultation\par

## 2022-06-14 ENCOUNTER — APPOINTMENT (OUTPATIENT)
Age: 52
End: 2022-06-14

## 2022-06-14 DIAGNOSIS — Z86.03 PERSONAL HISTORY OF NEOPLASM OF UNCERTAIN BEHAVIOR: ICD-10-CM

## 2022-06-14 DIAGNOSIS — Z01.818 ENCOUNTER FOR OTHER PREPROCEDURAL EXAMINATION: ICD-10-CM

## 2022-06-14 DIAGNOSIS — Z87.2 PERSONAL HISTORY OF DISEASES OF THE SKIN AND SUBCUTANEOUS TISSUE: ICD-10-CM

## 2022-06-14 DIAGNOSIS — Z92.89 PERSONAL HISTORY OF OTHER MEDICAL TREATMENT: ICD-10-CM

## 2022-06-14 DIAGNOSIS — R93.89 ABNORMAL FINDINGS ON DIAGNOSTIC IMAGING OF OTHER SPECIFIED BODY STRUCTURES: ICD-10-CM

## 2022-06-14 DIAGNOSIS — R22.2 LOCALIZED SWELLING, MASS AND LUMP, TRUNK: ICD-10-CM

## 2022-06-14 DIAGNOSIS — Z11.59 ENCOUNTER FOR SCREENING FOR OTHER VIRAL DISEASES: ICD-10-CM

## 2022-06-14 DIAGNOSIS — Z86.79 PERSONAL HISTORY OF OTHER DISEASES OF THE CIRCULATORY SYSTEM: ICD-10-CM

## 2022-06-14 PROCEDURE — 99443: CPT

## 2022-06-14 RX ORDER — INSULIN GLARGINE 100 [IU]/ML
INJECTION, SOLUTION SUBCUTANEOUS
Refills: 0 | Status: DISCONTINUED | COMMUNITY
End: 2022-06-14

## 2022-06-18 ENCOUNTER — OUTPATIENT (OUTPATIENT)
Dept: OUTPATIENT SERVICES | Facility: HOSPITAL | Age: 52
LOS: 1 days | End: 2022-06-18
Payer: COMMERCIAL

## 2022-06-18 DIAGNOSIS — Z11.52 ENCOUNTER FOR SCREENING FOR COVID-19: ICD-10-CM

## 2022-06-18 DIAGNOSIS — Z33.2 ENCOUNTER FOR ELECTIVE TERMINATION OF PREGNANCY: Chronic | ICD-10-CM

## 2022-06-18 DIAGNOSIS — D25.9 LEIOMYOMA OF UTERUS, UNSPECIFIED: Chronic | ICD-10-CM

## 2022-06-18 DIAGNOSIS — Z98.890 OTHER SPECIFIED POSTPROCEDURAL STATES: Chronic | ICD-10-CM

## 2022-06-18 DIAGNOSIS — O03.9 COMPLETE OR UNSPECIFIED SPONTANEOUS ABORTION WITHOUT COMPLICATION: Chronic | ICD-10-CM

## 2022-06-18 LAB — SARS-COV-2 RNA SPEC QL NAA+PROBE: SIGNIFICANT CHANGE UP

## 2022-06-18 PROCEDURE — U0003: CPT

## 2022-06-18 PROCEDURE — C9803: CPT

## 2022-06-18 PROCEDURE — U0005: CPT

## 2022-06-23 ENCOUNTER — RESULT REVIEW (OUTPATIENT)
Age: 52
End: 2022-06-23

## 2022-06-23 ENCOUNTER — TRANSCRIPTION ENCOUNTER (OUTPATIENT)
Age: 52
End: 2022-06-23

## 2022-06-23 ENCOUNTER — OUTPATIENT (OUTPATIENT)
Dept: OUTPATIENT SERVICES | Facility: HOSPITAL | Age: 52
LOS: 1 days | End: 2022-06-23
Payer: COMMERCIAL

## 2022-06-23 VITALS
DIASTOLIC BLOOD PRESSURE: 99 MMHG | OXYGEN SATURATION: 100 % | RESPIRATION RATE: 17 BRPM | HEART RATE: 88 BPM | SYSTOLIC BLOOD PRESSURE: 175 MMHG

## 2022-06-23 DIAGNOSIS — O03.9 COMPLETE OR UNSPECIFIED SPONTANEOUS ABORTION WITHOUT COMPLICATION: Chronic | ICD-10-CM

## 2022-06-23 DIAGNOSIS — Z33.2 ENCOUNTER FOR ELECTIVE TERMINATION OF PREGNANCY: Chronic | ICD-10-CM

## 2022-06-23 DIAGNOSIS — R22.2 LOCALIZED SWELLING, MASS AND LUMP, TRUNK: ICD-10-CM

## 2022-06-23 DIAGNOSIS — D25.9 LEIOMYOMA OF UTERUS, UNSPECIFIED: Chronic | ICD-10-CM

## 2022-06-23 DIAGNOSIS — Z98.890 OTHER SPECIFIED POSTPROCEDURAL STATES: Chronic | ICD-10-CM

## 2022-06-23 PROCEDURE — 20206 BIOPSY MUSCLE PERQ NEEDLE: CPT

## 2022-06-23 PROCEDURE — 88305 TISSUE EXAM BY PATHOLOGIST: CPT

## 2022-06-23 PROCEDURE — 88305 TISSUE EXAM BY PATHOLOGIST: CPT | Mod: 26

## 2022-06-23 PROCEDURE — 88342 IMHCHEM/IMCYTCHM 1ST ANTB: CPT

## 2022-06-23 PROCEDURE — 88342 IMHCHEM/IMCYTCHM 1ST ANTB: CPT | Mod: 26

## 2022-06-23 PROCEDURE — 76942 ECHO GUIDE FOR BIOPSY: CPT

## 2022-06-23 PROCEDURE — 76942 ECHO GUIDE FOR BIOPSY: CPT | Mod: 26

## 2022-06-23 NOTE — ASU DISCHARGE PLAN (ADULT/PEDIATRIC) - NS MD DC FALL RISK RISK
For information on Fall & Injury Prevention, visit: [email or web address] OR  [email or web address] OR  https://www.cdc.gov/steadi/patient.html

## 2022-06-23 NOTE — ASU DISCHARGE PLAN (ADULT/PEDIATRIC) - NURSING INSTRUCTIONS
Please feel free to contact us at [phone] if any problem arises. After 6PM. Monday through Friday,  on weekends and on holidays, please call us at [phone] and ask for the radiology resident on call to be paged.

## 2022-06-23 NOTE — ASU PATIENT PROFILE, ADULT - FALL HARM RISK - UNIVERSAL INTERVENTIONS
Bed in lowest position, wheels locked, appropriate side rails in place/Call bell, personal items and telephone in reach/Instruct patient to call for assistance before getting out of bed or chair/Non-slip footwear when patient is out of bed/[place] to call system/Physically safe environment - no spills, clutter or unnecessary equipment/Purposeful Proactive Rounding/Room/bathroom lighting operational, light cord in reach

## 2022-06-23 NOTE — PRE PROCEDURE NOTE - PRE PROCEDURE EVALUATION
Interventional Radiology    HPI: 51y Female with fibroid uterus found to have irregularly enhancing scarring in the anterior abdominal wall s/p hernia repair. Patient is here for biopsy for further evaluation.     Allergies:   Medications (Abx/Cardiac/Anticoagulation/Blood Products)      Data:    T(C): --  HR: --  BP: --  RR: --  SpO2: --    Exam  General: No acute distress  Chest: Non labored breathing  Abdomen: Non-distended  Extremities: No swelling, warm    Imaging: MRI of the abdomen/pelvis reviewed    Plan:   51y Female with fibroid uterus found to have irregularly enhancing scarring in the anterior abdominal wall s/p hernia repair. Patient is here for biopsy for further evaluation.   -- Relevant imaging and labs were reviewed.   -- Risks, benefits, and alternatives were explained to the patient and informed consent was obtained.

## 2022-06-23 NOTE — PROCEDURE NOTE - PROCEDURE FINDINGS AND DETAILS
Uncomplicated soft tissue biopsy under ultrasound guidance. Six passes made with 18G core biopsy needle. Three cores deemed adequate by cytologist. Post image showed no hematoma.

## 2022-06-30 LAB — NON-GYNECOLOGICAL CYTOLOGY STUDY: SIGNIFICANT CHANGE UP

## 2022-07-01 ENCOUNTER — EMERGENCY (EMERGENCY)
Facility: HOSPITAL | Age: 52
LOS: 1 days | Discharge: HOME | End: 2022-07-01
Attending: STUDENT IN AN ORGANIZED HEALTH CARE EDUCATION/TRAINING PROGRAM | Admitting: EMERGENCY MEDICINE

## 2022-07-01 VITALS
SYSTOLIC BLOOD PRESSURE: 192 MMHG | OXYGEN SATURATION: 100 % | DIASTOLIC BLOOD PRESSURE: 100 MMHG | WEIGHT: 203.05 LBS | RESPIRATION RATE: 16 BRPM | HEART RATE: 81 BPM | TEMPERATURE: 98 F | HEIGHT: 63 IN

## 2022-07-01 VITALS
RESPIRATION RATE: 16 BRPM | SYSTOLIC BLOOD PRESSURE: 177 MMHG | TEMPERATURE: 98 F | OXYGEN SATURATION: 100 % | DIASTOLIC BLOOD PRESSURE: 95 MMHG | HEART RATE: 85 BPM

## 2022-07-01 DIAGNOSIS — Z33.2 ENCOUNTER FOR ELECTIVE TERMINATION OF PREGNANCY: Chronic | ICD-10-CM

## 2022-07-01 DIAGNOSIS — D25.9 LEIOMYOMA OF UTERUS, UNSPECIFIED: Chronic | ICD-10-CM

## 2022-07-01 DIAGNOSIS — Z98.890 OTHER SPECIFIED POSTPROCEDURAL STATES: Chronic | ICD-10-CM

## 2022-07-01 DIAGNOSIS — O03.9 COMPLETE OR UNSPECIFIED SPONTANEOUS ABORTION WITHOUT COMPLICATION: Chronic | ICD-10-CM

## 2022-07-01 PROCEDURE — G1004: CPT

## 2022-07-01 PROCEDURE — 70450 CT HEAD/BRAIN W/O DYE: CPT | Mod: 26,ME

## 2022-07-01 PROCEDURE — 99284 EMERGENCY DEPT VISIT MOD MDM: CPT

## 2022-07-01 RX ORDER — METOCLOPRAMIDE HCL 10 MG
10 TABLET ORAL ONCE
Refills: 0 | Status: DISCONTINUED | OUTPATIENT
Start: 2022-07-01 | End: 2022-07-01

## 2022-07-01 RX ORDER — METOCLOPRAMIDE HCL 10 MG
10 TABLET ORAL ONCE
Refills: 0 | Status: COMPLETED | OUTPATIENT
Start: 2022-07-01 | End: 2022-07-01

## 2022-07-01 RX ORDER — SODIUM CHLORIDE 9 MG/ML
1000 INJECTION, SOLUTION INTRAVENOUS ONCE
Refills: 0 | Status: DISCONTINUED | OUTPATIENT
Start: 2022-07-01 | End: 2022-07-01

## 2022-07-01 RX ADMIN — Medication 10 MILLIGRAM(S): at 11:56

## 2022-07-01 NOTE — ED PROVIDER NOTE - NSICDXPASTMEDICALHX_GEN_ALL_CORE_FT
PAST MEDICAL HISTORY:  CHF (congestive heart failure) episode prior to placed on heart medications    CHF (congestive heart failure) 2011    CRI (Chronic Renal Insufficiency)     CVA (cerebral vascular accident) ischemic 2019    Diabetes mellitus type 2    DM (Diabetes Mellitus)     Excessive and frequent menstruation     Fibroid     Fibroids uterine fibroids S/P Surgical Resolution    HTN (Hypertension)     Hyperlipidemia     Hypertension     Hypertension     Leiomyoma

## 2022-07-01 NOTE — ED PROVIDER NOTE - PHYSICAL EXAMINATION
Vital signs reviewed.  CONSTITUTIONAL: NAD, awake  HEAD: Normocephalic; atraumatic  EYES: EOMI, no conjunctival injection, no scleral icterus.  No rotary nystagmus.    MOUTH/THROAT:  MMM  NECK: Trachea midline  CV: Normal S1, S2; no audible murmurs; extremities WWP  RESP: normal work of breathing; CTAB, no stridor  ABD: soft, non-distended; non-tender  : Deferred  MSK/EXT: no edema, no limited ROM  SKIN: No rashes on exposed skin surfaces  NEURO: Moves all extremities spontaneously with no focal deficits, speech is appropriate. Str 5/5 in b/l UE and LE.

## 2022-07-01 NOTE — ED PROVIDER NOTE - PATIENT PORTAL LINK FT
You can access the FollowMyHealth Patient Portal offered by [facility] by registering at the following website: [email or web address]. By joining [facility]’s FollowMyHealth portal, you will also be able to view your health information using other applications (apps) compatible with our system.

## 2022-07-01 NOTE — ED PROVIDER NOTE - CLINICAL SUMMARY MEDICAL DECISION MAKING FREE TEXT BOX
51F pmh DM, HTN, CKD, CVA 2019 no deficits who presents with headache x4 hours.  The differential diagnosis includes but is not limited to migraine, tension headache, less likely ICH.  Will get head CT, migraine cocktail, dispo pending.

## 2022-07-01 NOTE — ED ADULT NURSE NOTE - OBJECTIVE STATEMENT
Received patient to room 2 for headache. A&o4, ambulatory, c/o sudden onset of frontal headache since AM, denies n/v. No neuro deficits noted at this time, NSR on monitor, rr even and unlabored, gait steady. Right 20G AC placed. medicated per MD orders, hx of CVA, HTN

## 2022-07-01 NOTE — ED PROVIDER NOTE - NSFOLLOWUPINSTRUCTIONS_ED_ALL_ED_FT
Please follow up with your primary medical doctor/neurologist to discuss your headache symptoms. No evidence of acute bleed at this time; evidence of old scarring present. Please return to the ED for worsening headaches, fevers, chills, neurologic symptoms (loss of consciousness, visual changes, difficulty speaking/walking).

## 2022-07-01 NOTE — ED PROVIDER NOTE - NSICDXPASTSURGICALHX_GEN_ALL_CORE_FT
PAST SURGICAL HISTORY:   delivery delivered 2002     delivery delivered 2002    Fibroid excision of fibroid in     H/O myomectomy 2005    History of  Section     Miscarriage x 2    S/P myomectomy 2005    Termination of pregnancy (fetus) x 2

## 2022-07-01 NOTE — ED PROVIDER NOTE - ATTENDING CONTRIBUTION TO CARE
Dr. Moser, Attending Physician-  I performed a face to face bedside interview with patient regarding history of present illness, review of symptoms and past medical history. I completed an independent physical exam.  I have discussed patient's plan of care with the resident.    Pt is a 51F, HTN, prior CVA (no residual deficits), who presents with a headache. Awoke this morning her usual state of good health. While at work, began to develop a frontal headache. Not sudden/maximal at onset. No ocular/jaw/temple symptoms. No neck stiffness. No photo/phonophobia. No fevers, chills, rash. No head trauma. Tolerating PO. Stooling/voiding without any issues. Does not get headaches regularly. Not on blood thinners No cough, sputum, cp, sob, abdominal pain, nvd, dysuria, hematuria, recent sick contacts, travel, trauma, syncope. Physical: afebrile, well appearing, rrr, ctabl, CN2-12 grossly intact, stable gait. Plan: migraine cocktail, HCT given patient does not hx of headaches.

## 2022-07-01 NOTE — ED PROVIDER NOTE - OBJECTIVE STATEMENT
51F pmh HTN (well controlled), DM, CVA in 2019 (no residual deficits) who presents with headache.  She says she does not usually get migraines or headaches.  She denies headache being thunderclap or having sudden onset reaching maximal intensity in <1min.  Onset at 7:30am, associated with nausea, denies photophobia/phonophobia.  Feels like pressure, focused behind bridge of nose, 7-8/10 in intensity.  Took 2 tylenol at home with some improvement, took 3rd tylenol without much further improvement.    Denies fever, vomiting, SOB, CP, abd pain, diarrhea/constipation, LE edema.  No vision changes.

## 2022-07-01 NOTE — ED ADULT TRIAGE NOTE - CHIEF COMPLAINT QUOTE
c/o sudden onset frontal headache that started this morning at 0730. Pt did not have headache when she woke up this morning. Pt took BP and was elevated 174/104. Pt took Tylenol at 0930. Hx HTN, CVA 2019. FIT came to eval. No stroke code.

## 2022-07-07 DIAGNOSIS — R22.2 LOCALIZED SWELLING, MASS AND LUMP, TRUNK: ICD-10-CM

## 2022-07-28 ENCOUNTER — APPOINTMENT (OUTPATIENT)
Dept: ENDOCRINOLOGY | Facility: CLINIC | Age: 52
End: 2022-07-28

## 2022-08-16 NOTE — H&P PST ADULT - NEGATIVE GENERAL SYMPTOMS
No
no fever/no chills/no sweating/no anorexia/no weight loss/no weight gain/no polyphagia/no polyuria/no polydipsia/no malaise/no fatigue

## 2022-09-07 ENCOUNTER — APPOINTMENT (OUTPATIENT)
Dept: OBGYN | Facility: CLINIC | Age: 52
End: 2022-09-07

## 2022-09-07 VITALS
HEIGHT: 64 IN | BODY MASS INDEX: 33.97 KG/M2 | WEIGHT: 199 LBS | SYSTOLIC BLOOD PRESSURE: 176 MMHG | DIASTOLIC BLOOD PRESSURE: 114 MMHG

## 2022-09-07 DIAGNOSIS — D25.9 LEIOMYOMA OF UTERUS, UNSPECIFIED: ICD-10-CM

## 2022-09-07 PROCEDURE — 58100 BIOPSY OF UTERUS LINING: CPT

## 2022-09-13 PROBLEM — D25.9 UTERINE FIBROID: Status: ACTIVE | Noted: 2022-02-11

## 2022-09-13 NOTE — PLAN
[FreeTextEntry1] : 52 y/o for endometrial biopsy. BP today 176/114 and on repeat 169/98\par \par -tolerated biopsy attempt well\par -surgery scheduled for 10/25/22\par \par -discussed BP at length\par \par add on endo removal and talk with [facility]\par will touch base with nicholas davies insurance coverage

## 2022-09-13 NOTE — PROCEDURE
[Endometrial Biopsy] : Endometrial biopsy [Time out performed] : Pre-procedure time out performed.  Patient's name, date of birth and procedure confirmed. 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

## 2022-09-28 ENCOUNTER — APPOINTMENT (OUTPATIENT)
Dept: MAMMOGRAPHY | Facility: IMAGING CENTER | Age: 52
End: 2022-09-28

## 2022-09-28 ENCOUNTER — OUTPATIENT (OUTPATIENT)
Dept: OUTPATIENT SERVICES | Facility: HOSPITAL | Age: 52
LOS: 1 days | End: 2022-09-28
Payer: COMMERCIAL

## 2022-09-28 DIAGNOSIS — D25.9 LEIOMYOMA OF UTERUS, UNSPECIFIED: Chronic | ICD-10-CM

## 2022-09-28 DIAGNOSIS — Z98.890 OTHER SPECIFIED POSTPROCEDURAL STATES: Chronic | ICD-10-CM

## 2022-09-28 DIAGNOSIS — Z33.2 ENCOUNTER FOR ELECTIVE TERMINATION OF PREGNANCY: Chronic | ICD-10-CM

## 2022-09-28 DIAGNOSIS — Z00.8 ENCOUNTER FOR OTHER GENERAL EXAMINATION: ICD-10-CM

## 2022-09-28 DIAGNOSIS — O03.9 COMPLETE OR UNSPECIFIED SPONTANEOUS ABORTION WITHOUT COMPLICATION: Chronic | ICD-10-CM

## 2022-09-28 PROCEDURE — 77063 BREAST TOMOSYNTHESIS BI: CPT | Mod: 26

## 2022-09-28 PROCEDURE — 77067 SCR MAMMO BI INCL CAD: CPT

## 2022-09-28 PROCEDURE — 77063 BREAST TOMOSYNTHESIS BI: CPT

## 2022-09-28 PROCEDURE — 77067 SCR MAMMO BI INCL CAD: CPT | Mod: 26

## 2022-10-04 ENCOUNTER — OUTPATIENT (OUTPATIENT)
Dept: OUTPATIENT SERVICES | Facility: HOSPITAL | Age: 52
LOS: 1 days | End: 2022-10-04
Payer: COMMERCIAL

## 2022-10-04 VITALS
HEIGHT: 64 IN | HEART RATE: 88 BPM | OXYGEN SATURATION: 100 % | TEMPERATURE: 98 F | DIASTOLIC BLOOD PRESSURE: 91 MMHG | WEIGHT: 205.03 LBS | RESPIRATION RATE: 14 BRPM | SYSTOLIC BLOOD PRESSURE: 155 MMHG

## 2022-10-04 DIAGNOSIS — Z33.2 ENCOUNTER FOR ELECTIVE TERMINATION OF PREGNANCY: Chronic | ICD-10-CM

## 2022-10-04 DIAGNOSIS — K42.9 UMBILICAL HERNIA WITHOUT OBSTRUCTION OR GANGRENE: ICD-10-CM

## 2022-10-04 DIAGNOSIS — Z01.818 ENCOUNTER FOR OTHER PREPROCEDURAL EXAMINATION: ICD-10-CM

## 2022-10-04 DIAGNOSIS — D25.9 LEIOMYOMA OF UTERUS, UNSPECIFIED: ICD-10-CM

## 2022-10-04 DIAGNOSIS — D25.9 LEIOMYOMA OF UTERUS, UNSPECIFIED: Chronic | ICD-10-CM

## 2022-10-04 DIAGNOSIS — Z98.890 OTHER SPECIFIED POSTPROCEDURAL STATES: Chronic | ICD-10-CM

## 2022-10-04 DIAGNOSIS — O03.9 COMPLETE OR UNSPECIFIED SPONTANEOUS ABORTION WITHOUT COMPLICATION: Chronic | ICD-10-CM

## 2022-10-04 LAB
A1C WITH ESTIMATED AVERAGE GLUCOSE RESULT: 6.6 % — HIGH (ref 4–5.6)
ANION GAP SERPL CALC-SCNC: 10 MMOL/L — SIGNIFICANT CHANGE UP (ref 5–17)
BLD GP AB SCN SERPL QL: NEGATIVE — SIGNIFICANT CHANGE UP
BUN SERPL-MCNC: 27 MG/DL — HIGH (ref 7–23)
CALCIUM SERPL-MCNC: 9.8 MG/DL — SIGNIFICANT CHANGE UP (ref 8.4–10.5)
CHLORIDE SERPL-SCNC: 105 MMOL/L — SIGNIFICANT CHANGE UP (ref 96–108)
CO2 SERPL-SCNC: 25 MMOL/L — SIGNIFICANT CHANGE UP (ref 22–31)
CREAT SERPL-MCNC: 2.06 MG/DL — HIGH (ref 0.5–1.3)
EGFR: 28 ML/MIN/1.73M2 — LOW
ESTIMATED AVERAGE GLUCOSE: 143 MG/DL — HIGH (ref 68–114)
GLUCOSE SERPL-MCNC: 65 MG/DL — LOW (ref 70–99)
HCT VFR BLD CALC: 43.6 % — SIGNIFICANT CHANGE UP (ref 34.5–45)
HGB BLD-MCNC: 13.5 G/DL — SIGNIFICANT CHANGE UP (ref 11.5–15.5)
MCHC RBC-ENTMCNC: 28.5 PG — SIGNIFICANT CHANGE UP (ref 27–34)
MCHC RBC-ENTMCNC: 31 GM/DL — LOW (ref 32–36)
MCV RBC AUTO: 92 FL — SIGNIFICANT CHANGE UP (ref 80–100)
NRBC # BLD: 0 /100 WBCS — SIGNIFICANT CHANGE UP (ref 0–0)
PLATELET # BLD AUTO: 181 K/UL — SIGNIFICANT CHANGE UP (ref 150–400)
POTASSIUM SERPL-MCNC: 4.2 MMOL/L — SIGNIFICANT CHANGE UP (ref 3.5–5.3)
POTASSIUM SERPL-SCNC: 4.2 MMOL/L — SIGNIFICANT CHANGE UP (ref 3.5–5.3)
RBC # BLD: 4.74 M/UL — SIGNIFICANT CHANGE UP (ref 3.8–5.2)
RBC # FLD: 13.2 % — SIGNIFICANT CHANGE UP (ref 10.3–14.5)
RH IG SCN BLD-IMP: POSITIVE — SIGNIFICANT CHANGE UP
SODIUM SERPL-SCNC: 140 MMOL/L — SIGNIFICANT CHANGE UP (ref 135–145)
WBC # BLD: 5.24 K/UL — SIGNIFICANT CHANGE UP (ref 3.8–10.5)
WBC # FLD AUTO: 5.24 K/UL — SIGNIFICANT CHANGE UP (ref 3.8–10.5)

## 2022-10-04 PROCEDURE — 80048 BASIC METABOLIC PNL TOTAL CA: CPT

## 2022-10-04 PROCEDURE — 86900 BLOOD TYPING SEROLOGIC ABO: CPT

## 2022-10-04 PROCEDURE — 85027 COMPLETE CBC AUTOMATED: CPT

## 2022-10-04 PROCEDURE — 87086 URINE CULTURE/COLONY COUNT: CPT

## 2022-10-04 PROCEDURE — 83036 HEMOGLOBIN GLYCOSYLATED A1C: CPT

## 2022-10-04 PROCEDURE — G0463: CPT

## 2022-10-04 PROCEDURE — 86850 RBC ANTIBODY SCREEN: CPT

## 2022-10-04 PROCEDURE — 86901 BLOOD TYPING SEROLOGIC RH(D): CPT

## 2022-10-04 PROCEDURE — 36415 COLL VENOUS BLD VENIPUNCTURE: CPT

## 2022-10-04 RX ORDER — SITAGLIPTIN 50 MG/1
1 TABLET, FILM COATED ORAL
Qty: 0 | Refills: 0 | DISCHARGE

## 2022-10-04 RX ORDER — AMLODIPINE BESYLATE 2.5 MG/1
1 TABLET ORAL
Qty: 0 | Refills: 0 | DISCHARGE

## 2022-10-04 RX ORDER — DOXAZOSIN MESYLATE 4 MG
1 TABLET ORAL
Qty: 0 | Refills: 0 | DISCHARGE

## 2022-10-04 RX ORDER — FOLIC ACID 0.8 MG
2 TABLET ORAL
Qty: 0 | Refills: 0 | DISCHARGE

## 2022-10-04 RX ORDER — ERGOCALCIFEROL 1.25 MG/1
1 CAPSULE ORAL
Qty: 0 | Refills: 0 | DISCHARGE

## 2022-10-04 RX ORDER — CEFOTETAN DISODIUM 1 G
2 VIAL (EA) INJECTION ONCE
Refills: 0 | Status: DISCONTINUED | OUTPATIENT
Start: 2022-10-25 | End: 2022-10-26

## 2022-10-04 NOTE — H&P PST ADULT - HISTORY OF PRESENT ILLNESS
52 y/o F with PMHx of CVA (2/2 HTN in 12/2019 on ASA), HTN, T2DM, uterine fibroids with large aborting submucosal fibroid into the cervix & umbilical hernia on 3/15/22 a/p MRI.   Presents to PST prior to Total laparoscopic hysterectomy b/L salpingectomy, Cystoscopy, possible exploratory laparotomy umbilical hernia repairon 10/25/22.      Denies Hx of Covid-19 Infx.   Covid swab on 10/22/22   50 y/o F with PMHx of CVA (2/2 HTN in 12/2019 on ASA), HTN, T2DM, uterine fibroids with large aborting submucosal fibroid into the cervix & umbilical hernia on 3/15/22 a/p MRI.   Presents to PST prior to Total laparoscopic hysterectomy b/L salpingectomy, Cystoscopy, possible exploratory laparotomy umbilical hernia repair on 10/25/22.      Denies Hx of Covid-19 Infx.   Covid swab on 10/22/22   50 y/o F with PMHx of CVA (2/2 HTN in 12/2019 on ASA), HTN, T2DM, CKD3, uterine fibroids with large aborting submucosal fibroid into the cervix & umbilical hernia on 3/15/22 a/p MRI.   Presents to PST prior to Total laparoscopic hysterectomy b/L salpingectomy, Cystoscopy, possible exploratory laparotomy umbilical hernia repair on 10/25/22.      Denies Hx of Covid-19 Infx.   Covid swab on 10/22/22

## 2022-10-04 NOTE — H&P PST ADULT - NSICDXPASTMEDICALHX_GEN_ALL_CORE_FT
PAST MEDICAL HISTORY:  CHF (congestive heart failure) episode prior to placed on heart medications    CHF (congestive heart failure) 2011    CRI (Chronic Renal Insufficiency)     CVA (cerebral vascular accident) ischemic 2019    Diabetes mellitus type 2    DM (Diabetes Mellitus)     Excessive and frequent menstruation     Fibroid     Fibroids uterine fibroids S/P Surgical Resolution    HTN (Hypertension)     Hyperlipidemia     Hypertension     Hypertension     Leiomyoma      PAST MEDICAL HISTORY:  CHF (congestive heart failure) episode prior to placed on heart medications    CHF (congestive heart failure) 2011    CRI (Chronic Renal Insufficiency)     CVA (cerebral vascular accident) ischemic 2019    Diabetes mellitus type 2    DM (Diabetes Mellitus)     Excessive and frequent menstruation     Fibroid     Fibroids uterine fibroids S/P Surgical Resolution    HTN (Hypertension)     Hyperlipidemia     Hypertension     Hypertension     Leiomyoma     Stage 3 chronic kidney disease

## 2022-10-04 NOTE — H&P PST ADULT - PROBLEM SELECTOR PLAN 1
Dx: Total laparoscopic hysterectomy b/L salpingectomy, Cystoscopy, possible exploratory laparotomy umbilical hernia repair on 10/25/22  PST labs pending  AC: aspirin- Hx of stroke- may continue  Medical evaluation pending-10/4/22  Covid swab 10/22/22

## 2022-10-04 NOTE — H&P PST ADULT - NSICDXFAMILYHX_GEN_ALL_CORE_FT
FAMILY HISTORY:  FH: HTN (hypertension)  FH: type 2 diabetes    Father  Still living? No  Family history of diabetes mellitus (DM), Age at diagnosis: Age Unknown  Family history of hypertension, Age at diagnosis: Age Unknown    Mother  Still living? Yes, Estimated age: Age Unknown  Family history of hypertension, Age at diagnosis: Age Unknown

## 2022-10-04 NOTE — H&P PST ADULT - ATTENDING COMMENTS
pt seen and plan discussed. to proceed with scheduled procedure. all questions answered. informed consent signed and witnessed.     brie panchal

## 2022-10-06 ENCOUNTER — NON-APPOINTMENT (OUTPATIENT)
Age: 52
End: 2022-10-06

## 2022-10-06 ENCOUNTER — APPOINTMENT (OUTPATIENT)
Dept: CARDIOLOGY | Facility: CLINIC | Age: 52
End: 2022-10-06

## 2022-10-06 VITALS
HEART RATE: 82 BPM | OXYGEN SATURATION: 100 % | BODY MASS INDEX: 35.17 KG/M2 | DIASTOLIC BLOOD PRESSURE: 98 MMHG | RESPIRATION RATE: 17 BRPM | HEIGHT: 64 IN | WEIGHT: 206 LBS | SYSTOLIC BLOOD PRESSURE: 180 MMHG

## 2022-10-06 DIAGNOSIS — I10 ESSENTIAL (PRIMARY) HYPERTENSION: ICD-10-CM

## 2022-10-06 DIAGNOSIS — R82.71 BACTERIURIA: ICD-10-CM

## 2022-10-06 DIAGNOSIS — I63.9 CEREBRAL INFARCTION, UNSPECIFIED: ICD-10-CM

## 2022-10-06 LAB
CULTURE RESULTS: SIGNIFICANT CHANGE UP
SPECIMEN SOURCE: SIGNIFICANT CHANGE UP

## 2022-10-06 PROCEDURE — 99215 OFFICE O/P EST HI 40 MIN: CPT | Mod: 25

## 2022-10-06 PROCEDURE — 93000 ELECTROCARDIOGRAM COMPLETE: CPT | Mod: NC

## 2022-10-06 RX ORDER — EMPAGLIFLOZIN 10 MG/1
10 TABLET, FILM COATED ORAL
Qty: 90 | Refills: 0 | Status: ACTIVE | COMMUNITY
Start: 2022-10-06

## 2022-10-06 RX ORDER — INSULIN ASPART 100 [IU]/ML
100 INJECTION, SOLUTION INTRAVENOUS; SUBCUTANEOUS
Qty: 2 | Refills: 1 | Status: DISCONTINUED | COMMUNITY
Start: 2021-07-19 | End: 2022-10-06

## 2022-10-06 RX ORDER — HYDROCHLOROTHIAZIDE 12.5 MG/1
12.5 TABLET ORAL
Qty: 90 | Refills: 0 | Status: DISCONTINUED | COMMUNITY
Start: 2022-10-06 | End: 2022-10-06

## 2022-10-06 RX ORDER — ROSUVASTATIN CALCIUM 10 MG/1
10 TABLET, FILM COATED ORAL
Qty: 90 | Refills: 1 | Status: ACTIVE | COMMUNITY
Start: 2022-10-06

## 2022-10-06 RX ORDER — AMPICILLIN 500 MG/1
500 CAPSULE ORAL 4 TIMES DAILY
Qty: 28 | Refills: 0 | Status: ACTIVE | COMMUNITY
Start: 2022-10-06 | End: 1900-01-01

## 2022-10-06 RX ORDER — INSULIN GLARGINE 100 [IU]/ML
100 INJECTION, SOLUTION SUBCUTANEOUS AT BEDTIME
Qty: 2 | Refills: 1 | Status: DISCONTINUED | COMMUNITY
Start: 2021-07-19 | End: 2022-10-06

## 2022-10-06 RX ORDER — OLMESARTAN MEDOXOMIL 20 MG/1
20 TABLET, FILM COATED ORAL
Qty: 90 | Refills: 1 | Status: DISCONTINUED | COMMUNITY
Start: 2022-10-06 | End: 2022-10-06

## 2022-10-06 RX ORDER — LOSARTAN POTASSIUM AND HYDROCHLOROTHIAZIDE 25; 100 MG/1; MG/1
100-25 TABLET ORAL
Qty: 90 | Refills: 0 | Status: DISCONTINUED | COMMUNITY
Start: 2019-01-04 | End: 2022-10-06

## 2022-10-06 RX ORDER — INSULIN LISPRO 100 [IU]/ML
INJECTION, SOLUTION INTRAVENOUS; SUBCUTANEOUS
Refills: 0 | Status: DISCONTINUED | COMMUNITY
End: 2022-10-06

## 2022-10-06 RX ORDER — ROSUVASTATIN CALCIUM 20 MG/1
20 TABLET, FILM COATED ORAL DAILY
Qty: 90 | Refills: 2 | Status: DISCONTINUED | COMMUNITY
Start: 2020-03-11 | End: 2022-10-06

## 2022-10-06 RX ORDER — SITAGLIPTIN 100 MG/1
TABLET, FILM COATED ORAL
Refills: 0 | Status: DISCONTINUED | COMMUNITY
End: 2022-10-06

## 2022-10-06 NOTE — PHYSICAL EXAM
[General Appearance - Well Developed] : well developed [Normal Appearance] : normal appearance [Well Groomed] : well groomed [General Appearance - Well Nourished] : well nourished [No Deformities] : no deformities [General Appearance - In No Acute Distress] : no acute distress [Normal Conjunctiva] : the conjunctiva exhibited no abnormalities [Eyelids - No Xanthelasma] : the eyelids demonstrated no xanthelasmas [Normal Oral Mucosa] : normal oral mucosa [No Oral Pallor] : no oral pallor [No Oral Cyanosis] : no oral cyanosis [Normal Oropharynx] : normal oropharynx [Normal Jugular Venous A Waves Present] : normal jugular venous A waves present [Normal Jugular Venous V Waves Present] : normal jugular venous V waves present [No Jugular Venous Gaines A Waves] : no jugular venous gaines A waves [] : no respiratory distress [Respiration, Rhythm And Depth] : normal respiratory rhythm and effort [Exaggerated Use Of Accessory Muscles For Inspiration] : no accessory muscle use [Auscultation Breath Sounds / Voice Sounds] : lungs were clear to auscultation bilaterally [Heart Rate And Rhythm] : heart rate and rhythm were normal [Heart Sounds] : normal S1 and S2 [Murmurs] : no murmurs present [Arterial Pulses Normal] : the arterial pulses were normal [Edema] : no peripheral edema present [Veins - Varicosity Changes] : no varicosital changes were noted in the lower extremities [Bowel Sounds] : normal bowel sounds [Abdomen Soft] : soft [Abdomen Tenderness] : non-tender [Abnormal Walk] : normal gait [Gait - Sufficient For Exercise Testing] : the gait was sufficient for exercise testing [Nail Clubbing] : no clubbing of the fingernails [Cyanosis, Localized] : no localized cyanosis [Skin Color & Pigmentation] : normal skin color and pigmentation [No Venous Stasis] : no venous stasis [No Xanthoma] : no  xanthoma was observed [Oriented To Time, Place, And Person] : oriented to person, place, and time [Impaired Insight] : insight and judgment were intact [Mood] : the mood was normal [Affect] : the affect was normal [No Anxiety] : not feeling anxious

## 2022-10-07 PROBLEM — N18.30 CHRONIC KIDNEY DISEASE, STAGE 3 UNSPECIFIED: Chronic | Status: ACTIVE | Noted: 2022-10-04

## 2022-10-12 ENCOUNTER — APPOINTMENT (OUTPATIENT)
Dept: CARDIOLOGY | Facility: CLINIC | Age: 52
End: 2022-10-12

## 2022-10-12 VITALS
HEIGHT: 64 IN | SYSTOLIC BLOOD PRESSURE: 176 MMHG | BODY MASS INDEX: 35.17 KG/M2 | RESPIRATION RATE: 17 BRPM | WEIGHT: 206 LBS | OXYGEN SATURATION: 100 % | DIASTOLIC BLOOD PRESSURE: 108 MMHG | HEART RATE: 78 BPM

## 2022-10-12 VITALS — DIASTOLIC BLOOD PRESSURE: 104 MMHG | SYSTOLIC BLOOD PRESSURE: 180 MMHG

## 2022-10-12 DIAGNOSIS — Z01.810 ENCOUNTER FOR PREPROCEDURAL CARDIOVASCULAR EXAMINATION: ICD-10-CM

## 2022-10-12 PROCEDURE — 99214 OFFICE O/P EST MOD 30 MIN: CPT

## 2022-10-12 RX ORDER — HYDRALAZINE HYDROCHLORIDE 50 MG/1
50 TABLET ORAL
Qty: 270 | Refills: 0 | Status: ACTIVE | COMMUNITY
Start: 2022-10-12 | End: 1900-01-01

## 2022-10-12 RX ORDER — DOXAZOSIN 8 MG/1
8 TABLET ORAL DAILY
Qty: 90 | Refills: 1 | Status: ACTIVE | COMMUNITY
Start: 2020-03-11 | End: 1900-01-01

## 2022-10-12 RX ORDER — CLONIDINE 0.2 MG/24H
0.2 PATCH, EXTENDED RELEASE TRANSDERMAL
Refills: 0 | Status: DISCONTINUED | COMMUNITY
End: 2022-10-12

## 2022-10-13 LAB
ANION GAP SERPL CALC-SCNC: 16 MMOL/L
BUN SERPL-MCNC: 21 MG/DL
CALCIUM SERPL-MCNC: 9.5 MG/DL
CHLORIDE SERPL-SCNC: 105 MMOL/L
CO2 SERPL-SCNC: 20 MMOL/L
CREAT SERPL-MCNC: 1.67 MG/DL
EGFR: 36 ML/MIN/1.73M2
GLUCOSE SERPL-MCNC: 124 MG/DL
POTASSIUM SERPL-SCNC: 3.9 MMOL/L
SODIUM SERPL-SCNC: 141 MMOL/L

## 2022-10-14 PROBLEM — I63.9 CVA (CEREBRAL VASCULAR ACCIDENT): Status: ACTIVE | Noted: 2020-03-11

## 2022-10-14 PROBLEM — I10 ACCELERATED ESSENTIAL HYPERTENSION: Status: ACTIVE | Noted: 2020-03-11

## 2022-10-18 RX ORDER — LABETALOL HYDROCHLORIDE 200 MG/1
200 TABLET, FILM COATED ORAL
Qty: 180 | Refills: 5 | Status: ACTIVE | COMMUNITY
Start: 2019-01-04 | End: 1900-01-01

## 2022-10-22 ENCOUNTER — OUTPATIENT (OUTPATIENT)
Dept: OUTPATIENT SERVICES | Facility: HOSPITAL | Age: 52
LOS: 1 days | End: 2022-10-22
Payer: COMMERCIAL

## 2022-10-22 DIAGNOSIS — D25.9 LEIOMYOMA OF UTERUS, UNSPECIFIED: Chronic | ICD-10-CM

## 2022-10-22 DIAGNOSIS — Z11.52 ENCOUNTER FOR SCREENING FOR COVID-19: ICD-10-CM

## 2022-10-22 DIAGNOSIS — O03.9 COMPLETE OR UNSPECIFIED SPONTANEOUS ABORTION WITHOUT COMPLICATION: Chronic | ICD-10-CM

## 2022-10-22 DIAGNOSIS — Z33.2 ENCOUNTER FOR ELECTIVE TERMINATION OF PREGNANCY: Chronic | ICD-10-CM

## 2022-10-22 DIAGNOSIS — Z98.890 OTHER SPECIFIED POSTPROCEDURAL STATES: Chronic | ICD-10-CM

## 2022-10-22 LAB — SARS-COV-2 RNA SPEC QL NAA+PROBE: SIGNIFICANT CHANGE UP

## 2022-10-22 PROCEDURE — U0005: CPT

## 2022-10-22 PROCEDURE — C9803: CPT

## 2022-10-22 PROCEDURE — U0003: CPT

## 2022-10-24 ENCOUNTER — TRANSCRIPTION ENCOUNTER (OUTPATIENT)
Age: 52
End: 2022-10-24

## 2022-10-24 NOTE — DISCUSSION/SUMMARY
[FreeTextEntry1] : Patient is a 51 year-old woman with prior CVA in the setting of hypertensive crisis now requiring abdominal surgery.\par Blood pressure is today remains higher than ideal at 180/104 mmHg manually.\par \par Creatinine has continued to rise. Now 2.02 mg/dL as seen on PST labs- followed by nephrology.\par Will plan to continue labetalol 600 mg TID and amlodipine 10 mg daily. Discontinue transdermal clonidine.\par She will resume doxazosin 8 mg nightly and begin hydralazine 50 mg TID for additional antihypertensive effects. Improved blood pressure control will be required prior to any surgical intervention.\par \par Labs today to monitor renal function.\par \par Follow up in one weeks time.

## 2022-10-24 NOTE — HISTORY OF PRESENT ILLNESS
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 She reports that this resolved with medications but without intervention.\par \par Dermatologic biopsy with concern for sarcoidosis in 2020. No work-up has been done to evaluate heart or lungs for sarcoidosis, but patient has no history of syncope, palpitations, or conduction disease on ECG.\par \par 2020 - On July 15, 2020, patient was at work and noted right arm and right leg weakness. She dragged herself to the ER. Stroke protocol was called. By the time of the CT scan, her symptoms had entirely resolved. She was discharged the same day. \par \par Patient referred here by her neurologist, Silvana Bro MD.\par \par 2020 - Patient returns today in her usual state of health. She has not had recurrence of neurologic symptoms. She has also not called [facility] to schedule her RITESH because she is nervous about the procedure. In the interim since her last visit, her creatinine has risen and she was told to schedule a renal biopsy. She has not yet scheduled this procedure either.\par \par 10/6/2022.\par She returns today for preoperative cardiovascular evaluation prior to planned surgery on 10/25/2022. She is to undergo uterine fibroid removal, partial hysterectomy and hernia repair with Bradley Rascon and Mark Sheppard at [facility]. Today she reports feeling well overall and has no specific cardiac complaints aside from concern for her elevated blood pressure. 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 \par \par Dr. Bianca Schneider [phone] FAX: PMD. \par \par PMD: Boaz Galvez MD [phone]\par Dermatologist: \par Gyn: Crys Fiore MD [phone]\par Hematologist: Socorro Trujillo MD [phone]\par Nephrologist: Khai Alaniz MD [phone]

## 2022-10-24 NOTE — HISTORY OF PRESENT ILLNESS
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 She reports that this resolved with medications but without intervention.\par \par Dermatologic biopsy with concern for sarcoidosis in 2020. No work-up has been done to evaluate heart or lungs for sarcoidosis, but patient has no history of syncope, palpitations, or conduction disease on ECG.\par \par 2020 - On July 15, 2020, patient was at work and noted right arm and right leg weakness. She dragged herself to the ER. Stroke protocol was called. By the time of the CT scan, her symptoms had entirely resolved. She was discharged the same day. \par \par Patient referred here by her neurologist, Silvana Bro MD.\par \par 2020 - Patient returns today in her usual state of health. She has not had recurrence of neurologic symptoms. She has also not called [facility] to schedule her RITESH because she is nervous about the procedure. In the interim since her last visit, her creatinine has risen and she was told to schedule a renal biopsy. She has not yet scheduled this procedure either.\par \par PMD: Boaz Galvez MD [phone]\par Dermatologist: \par Gyn: Crys Fiore MD [phone]\par Hematologist: Socorro Trujillo MD [phone]\par Nephrologist: Khai Alaniz MD [phone]

## 2022-10-24 NOTE — ADDENDUM
[FreeTextEntry1] : 10/21/2022.\par Most recent labs reviewed. Creatinine improved and now 1.67 mg/dL.\par Her blood pressure is also much improved on the current regimen and she will continue with labetalol, amlodipine, doxazosin and hydralazine as directed. \par \par No further cardiac testing is required at this time. She may proceed with the abdominal surgery as planned. \par Will repeat blood pressure prior to her going to the OR.\par

## 2022-10-24 NOTE — DISCUSSION/SUMMARY
[EKG obtained to assist in diagnosis and management of assessed problem(s)] : EKG obtained to assist in diagnosis and management of assessed problem(s) [FreeTextEntry1] : Patient is a 51 year-old woman with prior CVA in the setting of hypertensive crisis now requiring abdominal surgery.\par Blood pressure is today is higher than ideal at 180/98 mmHg manually.\par Currently she is maintained on labetalol 400 mg BID, amlodipine 5 mg QD, and clonidine transdermal 0.2 mg weekly. \par Creatinine has continued to rise. Now 2.02 mg/dL as seen on PST labs- followed by nephrology.\par Will plan to increase labetalol to 400 mg TID for added antihypertensive effects, as this will be required prior to any surgical consideration.\par \par Diet and exercise with goal of adding lean muscle and reducing fat to address insulin resistant state. \par Weight loss will also likely improve blood pressure control.\par \par Follow up next week with labs.

## 2022-10-24 NOTE — END OF VISIT
[FreeTextEntry3] : I saw the patient with Sybil Oliveros NP and I agree with the findings and plan as above.  [Time Spent: ___ minutes] : I have spent [unfilled] minutes of time on the encounter.

## 2022-10-25 ENCOUNTER — RESULT REVIEW (OUTPATIENT)
Age: 52
End: 2022-10-25

## 2022-10-25 ENCOUNTER — OUTPATIENT (OUTPATIENT)
Dept: INPATIENT UNIT | Facility: HOSPITAL | Age: 52
LOS: 1 days | End: 2022-10-25
Payer: COMMERCIAL

## 2022-10-25 ENCOUNTER — APPOINTMENT (OUTPATIENT)
Dept: OBGYN | Facility: HOSPITAL | Age: 52
End: 2022-10-25

## 2022-10-25 ENCOUNTER — TRANSCRIPTION ENCOUNTER (OUTPATIENT)
Age: 52
End: 2022-10-25

## 2022-10-25 VITALS
HEIGHT: 64 IN | RESPIRATION RATE: 17 BRPM | WEIGHT: 205.03 LBS | OXYGEN SATURATION: 100 % | TEMPERATURE: 98 F | DIASTOLIC BLOOD PRESSURE: 92 MMHG | SYSTOLIC BLOOD PRESSURE: 163 MMHG | HEART RATE: 86 BPM

## 2022-10-25 DIAGNOSIS — K42.9 UMBILICAL HERNIA WITHOUT OBSTRUCTION OR GANGRENE: ICD-10-CM

## 2022-10-25 DIAGNOSIS — D25.9 LEIOMYOMA OF UTERUS, UNSPECIFIED: ICD-10-CM

## 2022-10-25 DIAGNOSIS — D25.9 LEIOMYOMA OF UTERUS, UNSPECIFIED: Chronic | ICD-10-CM

## 2022-10-25 DIAGNOSIS — Z98.890 OTHER SPECIFIED POSTPROCEDURAL STATES: Chronic | ICD-10-CM

## 2022-10-25 DIAGNOSIS — O03.9 COMPLETE OR UNSPECIFIED SPONTANEOUS ABORTION WITHOUT COMPLICATION: Chronic | ICD-10-CM

## 2022-10-25 DIAGNOSIS — Z33.2 ENCOUNTER FOR ELECTIVE TERMINATION OF PREGNANCY: Chronic | ICD-10-CM

## 2022-10-25 LAB
ANION GAP SERPL CALC-SCNC: 10 MMOL/L — SIGNIFICANT CHANGE UP (ref 5–17)
BASE EXCESS BLDV CALC-SCNC: 0 MMOL/L — SIGNIFICANT CHANGE UP (ref -2–3)
BUN SERPL-MCNC: 23 MG/DL — SIGNIFICANT CHANGE UP (ref 7–23)
CA-I SERPL-SCNC: 1.11 MMOL/L — LOW (ref 1.15–1.33)
CALCIUM SERPL-MCNC: 8.1 MG/DL — LOW (ref 8.4–10.5)
CHLORIDE BLDV-SCNC: 104 MMOL/L — SIGNIFICANT CHANGE UP (ref 96–108)
CHLORIDE SERPL-SCNC: 105 MMOL/L — SIGNIFICANT CHANGE UP (ref 96–108)
CO2 BLDV-SCNC: 26 MMOL/L — SIGNIFICANT CHANGE UP (ref 22–26)
CO2 SERPL-SCNC: 24 MMOL/L — SIGNIFICANT CHANGE UP (ref 22–31)
CREAT SERPL-MCNC: 1.81 MG/DL — HIGH (ref 0.5–1.3)
EGFR: 33 ML/MIN/1.73M2 — LOW
GAS PNL BLDV: 134 MMOL/L — LOW (ref 136–145)
GAS PNL BLDV: SIGNIFICANT CHANGE UP
GAS PNL BLDV: SIGNIFICANT CHANGE UP
GLUCOSE BLDC GLUCOMTR-MCNC: 115 MG/DL — HIGH (ref 70–99)
GLUCOSE BLDC GLUCOMTR-MCNC: 178 MG/DL — HIGH (ref 70–99)
GLUCOSE BLDV-MCNC: 167 MG/DL — HIGH (ref 70–99)
GLUCOSE SERPL-MCNC: 188 MG/DL — HIGH (ref 70–99)
HCG UR QL: NEGATIVE — SIGNIFICANT CHANGE UP
HCO3 BLDV-SCNC: 25 MMOL/L — SIGNIFICANT CHANGE UP (ref 22–29)
HCT VFR BLD CALC: 33.2 % — LOW (ref 34.5–45)
HCT VFR BLDA CALC: 35 % — SIGNIFICANT CHANGE UP (ref 34.5–46.5)
HGB BLD CALC-MCNC: 11.5 G/DL — LOW (ref 11.7–16.1)
HGB BLD-MCNC: 10.8 G/DL — LOW (ref 11.5–15.5)
LACTATE BLDV-MCNC: 0.6 MMOL/L — SIGNIFICANT CHANGE UP (ref 0.5–2)
MCHC RBC-ENTMCNC: 29.1 PG — SIGNIFICANT CHANGE UP (ref 27–34)
MCHC RBC-ENTMCNC: 32.5 GM/DL — SIGNIFICANT CHANGE UP (ref 32–36)
MCV RBC AUTO: 89.5 FL — SIGNIFICANT CHANGE UP (ref 80–100)
NRBC # BLD: 0 /100 WBCS — SIGNIFICANT CHANGE UP (ref 0–0)
PCO2 BLDV: 40 MMHG — SIGNIFICANT CHANGE UP (ref 39–42)
PH BLDV: 7.4 — SIGNIFICANT CHANGE UP (ref 7.32–7.43)
PLATELET # BLD AUTO: 193 K/UL — SIGNIFICANT CHANGE UP (ref 150–400)
PO2 BLDV: 73 MMHG — HIGH (ref 25–45)
POTASSIUM BLDV-SCNC: 3.7 MMOL/L — SIGNIFICANT CHANGE UP (ref 3.5–5.1)
POTASSIUM SERPL-MCNC: 3.9 MMOL/L — SIGNIFICANT CHANGE UP (ref 3.5–5.3)
POTASSIUM SERPL-SCNC: 3.9 MMOL/L — SIGNIFICANT CHANGE UP (ref 3.5–5.3)
RBC # BLD: 3.71 M/UL — LOW (ref 3.8–5.2)
RBC # FLD: 13.3 % — SIGNIFICANT CHANGE UP (ref 10.3–14.5)
RH IG SCN BLD-IMP: POSITIVE — SIGNIFICANT CHANGE UP
SAO2 % BLDV: 98.1 % — HIGH (ref 67–88)
SODIUM SERPL-SCNC: 139 MMOL/L — SIGNIFICANT CHANGE UP (ref 135–145)
WBC # BLD: 14.39 K/UL — HIGH (ref 3.8–10.5)
WBC # FLD AUTO: 14.39 K/UL — HIGH (ref 3.8–10.5)

## 2022-10-25 PROCEDURE — 88302 TISSUE EXAM BY PATHOLOGIST: CPT | Mod: 26

## 2022-10-25 PROCEDURE — 58571 TLH W/T/O 250 G OR LESS: CPT

## 2022-10-25 PROCEDURE — 88307 TISSUE EXAM BY PATHOLOGIST: CPT | Mod: 26

## 2022-10-25 PROCEDURE — 88305 TISSUE EXAM BY PATHOLOGIST: CPT | Mod: 26

## 2022-10-25 DEVICE — INTERCEED 5 X 6" XL: Type: IMPLANTABLE DEVICE | Status: FUNCTIONAL

## 2022-10-25 DEVICE — CLIP APPLIER COVIDIEN ENDOCLIP III 5MM: Type: IMPLANTABLE DEVICE | Status: FUNCTIONAL

## 2022-10-25 DEVICE — VISTASEAL FIBRIN HUMAN 4ML: Type: IMPLANTABLE DEVICE | Status: FUNCTIONAL

## 2022-10-25 RX ORDER — OXYCODONE HYDROCHLORIDE 5 MG/1
1 TABLET ORAL
Qty: 6 | Refills: 0
Start: 2022-10-25

## 2022-10-25 RX ORDER — SENNA PLUS 8.6 MG/1
1 TABLET ORAL
Qty: 14 | Refills: 0
Start: 2022-10-25 | End: 2022-11-07

## 2022-10-25 RX ORDER — CHLORHEXIDINE GLUCONATE 213 G/1000ML
1 SOLUTION TOPICAL ONCE
Refills: 0 | Status: DISCONTINUED | OUTPATIENT
Start: 2022-10-25 | End: 2022-10-25

## 2022-10-25 RX ORDER — METOPROLOL TARTRATE 50 MG
5 TABLET ORAL EVERY 6 HOURS
Refills: 0 | Status: DISCONTINUED | OUTPATIENT
Start: 2022-10-25 | End: 2022-10-26

## 2022-10-25 RX ORDER — HYDROMORPHONE HYDROCHLORIDE 2 MG/ML
1 INJECTION INTRAMUSCULAR; INTRAVENOUS; SUBCUTANEOUS
Refills: 0 | Status: DISCONTINUED | OUTPATIENT
Start: 2022-10-25 | End: 2022-10-26

## 2022-10-25 RX ORDER — ACETAMINOPHEN 500 MG
975 TABLET ORAL EVERY 6 HOURS
Refills: 0 | Status: DISCONTINUED | OUTPATIENT
Start: 2022-10-25 | End: 2022-11-08

## 2022-10-25 RX ORDER — OXYCODONE HYDROCHLORIDE 5 MG/1
5 TABLET ORAL EVERY 6 HOURS
Refills: 0 | Status: DISCONTINUED | OUTPATIENT
Start: 2022-10-25 | End: 2022-10-25

## 2022-10-25 RX ORDER — ONDANSETRON 8 MG/1
4 TABLET, FILM COATED ORAL ONCE
Refills: 0 | Status: DISCONTINUED | OUTPATIENT
Start: 2022-10-25 | End: 2022-10-26

## 2022-10-25 RX ORDER — SIMETHICONE 80 MG/1
80 TABLET, CHEWABLE ORAL DAILY
Refills: 0 | Status: DISCONTINUED | OUTPATIENT
Start: 2022-10-25 | End: 2022-11-08

## 2022-10-25 RX ORDER — HEPARIN SODIUM 5000 [USP'U]/ML
5000 INJECTION INTRAVENOUS; SUBCUTANEOUS EVERY 12 HOURS
Refills: 0 | Status: DISCONTINUED | OUTPATIENT
Start: 2022-10-25 | End: 2022-11-08

## 2022-10-25 RX ORDER — ONDANSETRON 8 MG/1
8 TABLET, FILM COATED ORAL ONCE
Refills: 0 | Status: DISCONTINUED | OUTPATIENT
Start: 2022-10-25 | End: 2022-10-26

## 2022-10-25 RX ORDER — GABAPENTIN 400 MG/1
300 CAPSULE ORAL ONCE
Refills: 0 | Status: COMPLETED | OUTPATIENT
Start: 2022-10-25 | End: 2022-10-25

## 2022-10-25 RX ORDER — CELECOXIB 200 MG/1
400 CAPSULE ORAL ONCE
Refills: 0 | Status: COMPLETED | OUTPATIENT
Start: 2022-10-25 | End: 2022-10-25

## 2022-10-25 RX ORDER — ONDANSETRON 8 MG/1
4 TABLET, FILM COATED ORAL EVERY 6 HOURS
Refills: 0 | Status: DISCONTINUED | OUTPATIENT
Start: 2022-10-25 | End: 2022-11-08

## 2022-10-25 RX ORDER — HYDROMORPHONE HYDROCHLORIDE 2 MG/ML
0.5 INJECTION INTRAMUSCULAR; INTRAVENOUS; SUBCUTANEOUS
Refills: 0 | Status: DISCONTINUED | OUTPATIENT
Start: 2022-10-25 | End: 2022-10-26

## 2022-10-25 RX ORDER — SODIUM CHLORIDE 9 MG/ML
1000 INJECTION, SOLUTION INTRAVENOUS
Refills: 0 | Status: DISCONTINUED | OUTPATIENT
Start: 2022-10-25 | End: 2022-10-26

## 2022-10-25 RX ORDER — SODIUM CHLORIDE 9 MG/ML
3 INJECTION INTRAMUSCULAR; INTRAVENOUS; SUBCUTANEOUS EVERY 8 HOURS
Refills: 0 | Status: DISCONTINUED | OUTPATIENT
Start: 2022-10-25 | End: 2022-10-25

## 2022-10-25 RX ORDER — IBUPROFEN 200 MG
600 TABLET ORAL EVERY 6 HOURS
Refills: 0 | Status: DISCONTINUED | OUTPATIENT
Start: 2022-10-25 | End: 2022-11-08

## 2022-10-25 RX ORDER — METOPROLOL TARTRATE 50 MG
5 TABLET ORAL EVERY 6 HOURS
Refills: 0 | Status: DISCONTINUED | OUTPATIENT
Start: 2022-10-25 | End: 2022-10-25

## 2022-10-25 RX ORDER — ACETAMINOPHEN 500 MG
1000 TABLET ORAL ONCE
Refills: 0 | Status: COMPLETED | OUTPATIENT
Start: 2022-10-25 | End: 2022-10-25

## 2022-10-25 RX ORDER — SENNA PLUS 8.6 MG/1
2 TABLET ORAL AT BEDTIME
Refills: 0 | Status: DISCONTINUED | OUTPATIENT
Start: 2022-10-25 | End: 2022-11-08

## 2022-10-25 RX ORDER — LIDOCAINE HCL 20 MG/ML
0.2 VIAL (ML) INJECTION ONCE
Refills: 0 | Status: DISCONTINUED | OUTPATIENT
Start: 2022-10-25 | End: 2022-10-25

## 2022-10-25 RX ADMIN — Medication 5 MILLIGRAM(S): at 23:22

## 2022-10-25 RX ADMIN — SENNA PLUS 2 TABLET(S): 8.6 TABLET ORAL at 23:07

## 2022-10-25 RX ADMIN — Medication 600 MILLIGRAM(S): at 23:07

## 2022-10-25 RX ADMIN — HEPARIN SODIUM 5000 UNIT(S): 5000 INJECTION INTRAVENOUS; SUBCUTANEOUS at 18:08

## 2022-10-25 RX ADMIN — CELECOXIB 400 MILLIGRAM(S): 200 CAPSULE ORAL at 08:42

## 2022-10-25 RX ADMIN — SODIUM CHLORIDE 100 MILLILITER(S): 9 INJECTION, SOLUTION INTRAVENOUS at 18:08

## 2022-10-25 RX ADMIN — GABAPENTIN 300 MILLIGRAM(S): 400 CAPSULE ORAL at 08:41

## 2022-10-25 RX ADMIN — SODIUM CHLORIDE 100 MILLILITER(S): 9 INJECTION, SOLUTION INTRAVENOUS at 23:22

## 2022-10-25 RX ADMIN — Medication 1000 MILLIGRAM(S): at 08:41

## 2022-10-25 RX ADMIN — Medication 975 MILLIGRAM(S): at 19:44

## 2022-10-25 RX ADMIN — Medication 975 MILLIGRAM(S): at 20:14

## 2022-10-25 RX ADMIN — SIMETHICONE 80 MILLIGRAM(S): 80 TABLET, CHEWABLE ORAL at 23:38

## 2022-10-25 NOTE — BRIEF OPERATIVE NOTE - NSICDXBRIEFPROCEDURE_GEN_ALL_CORE_FT
PROCEDURES:  Ventral hernia repair 25-Oct-2022 16:20:50  Mini Helms  
PROCEDURES:  Repair of umbilical hernia in adult 25-Oct-2022 16:12:53  Denny Spnecer  
PROCEDURES:  Hysterectomy, supracervical, laparoscopic, with cystoscopy 25-Oct-2022 14:08:09  Asiha Lazaro  Laparoscopic salpingo-oophorectomy, right 25-Oct-2022 14:08:24  Aisha Lazaro  Left salpingectomy 25-Oct-2022 14:08:34  Aisha Lazaro

## 2022-10-25 NOTE — CHART NOTE - NSCHARTNOTEFT_GEN_A_CORE
R2 GYN POST-OP CHECK    SUBJECTIVE:  50yo female s/p LSC URVASHI, BS, RO, MAGDA, Cystoscopy, Repair of cervical lac and repair of umbilical hernia (by gen surg) seen and evaluated at bedside. Patient sleeping, easily arousable. Endorses abdominal pain. Denies N/V, SOB, CP, palpitations, fever/chills. Has not yet had anything to eat or ambulated.     OBJECTIVE:  T(C): 36.2 (10-25-22 @ 16:00), Max: 36.2 (10-25-22 @ 15:00)  HR: 80 (10-25-22 @ 17:00) (80 - 86)  BP: 123/66 (10-25-22 @ 17:00) (122/60 - 146/82)  RR: 16 (10-25-22 @ 17:00) (16 - 16)  SpO2: 100% (10-25-22 @ 17:00) (95% - 100%)  Wt(kg): --  I&O's Summary    25 Oct 2022 07:01  -  25 Oct 2022 17:25  --------------------------------------------------------  IN: 200 mL / OUT: 0 mL / NET: 200 mL        Gen: Resting comfortably in bed, NAD  CV: RRR  Lungs: sating comfortably on room air   Abd: Soft, appropriately tender, non-distended  Inc: LSC port sites and umbilical incision c/d/i with dermabond in place.  Ext: SCD's in place and functional, non-tender bilaterally, no edema    ASSESSMENT:  50yo female with HTN, DM, CHF and CVA (2019) s/p C URVASHI, BS, RO, MAGDA, Cystoscopy, Repair of cervical lac and repair of umbilical hernia (by gen surg) for myomatous uterus and AUB.         PLAN:  1. Neuro: Analgesia PRN, per anesthesia while in PACU. Following orders from primary team:   acetaminophen     Tablet .. 975 milliGRAM(s) Oral every 6 hours  HYDROmorphone  Injectable 0.5 milliGRAM(s) IV Push every 10 minutes PRN  HYDROmorphone  Injectable 1 milliGRAM(s) IV Push every 10 minutes PRN  ibuprofen  Tablet. 600 milliGRAM(s) Oral every 6 hours  ondansetron Injectable 4 milliGRAM(s) IV Push once PRN  ondansetron Injectable 8 milliGRAM(s) IV Push once PRN  ondansetron Injectable 4 milliGRAM(s) IV Push every 6 hours PRN  oxyCODONE    IR 5 milliGRAM(s) Oral every 6 hours PRN       2. CV: Hemodynamically stable.  Monitor VS.  - STAT post-op H/H 10.8/33.2  - repeat CBC in AM.     3. Pulm: Saturating well on room air.  Encourage OOB and incentive spirometer use.    4. GI: Advance to regular diet. Anti-emetics PRN.    5. : DTV @1030pm    6. Electrolytes: LR@100cc/hr.   - SLIV after void   - BMP in AM.     7. DVT ppx w/ SCD's while in bed. Early ambulation, initially with assistance then as tolerated.    - HSQ ordered for 6pm tonight     8. Will stay for 23 hour observation; discharge plan in AM pending labs and clinical status     d/w Dr. Abiodun Garcia MD, PGY-2

## 2022-10-25 NOTE — ASU DISCHARGE PLAN (ADULT/PEDIATRIC) - CARE PROVIDER_API CALL
Soledad Rascon)  Obstetrics and Gynecology  [address] [place] [address]  Phone: [phone]  Fax: [phone]  Follow Up Time:     Mark Sheppard)  Surgery  [address] [facility], [address]  [place] [address]  Phone: [phone]  Fax: [phone]  Follow Up Time:

## 2022-10-25 NOTE — BRIEF OPERATIVE NOTE - SPECIMENS
hernia contents
hernia sac and contents
uterus, righty ovary, bilateral fallopian tube, vaginal lesion

## 2022-10-25 NOTE — ASU DISCHARGE PLAN (ADULT/PEDIATRIC) - PROCEDURE
LSC Supracervical Hysterectomy, Bilateral Salpingectomy, Right Oophorectomy, Lysis of Adhesions, Cystoscopy, repair of cervical laceration, repair of umbilical hernia

## 2022-10-25 NOTE — ASU DISCHARGE PLAN (ADULT/PEDIATRIC) - ASU DC SPECIAL INSTRUCTIONSFT
Return to your regular way of eating.  Resume normal activity as tolerated, but no heavy lifting or strenuous activity for 6-8 weeks.  No driving for next 2 weeks and/or while on narcotic pain medication.  Complete vaginal rest, no tampons, no douching, no tub bathing, no sexual activities for 6 weeks unless otherwise instructed by your doctor.  Call your doctor with any signs and symptoms of infection such as fever, chills, nausea or vomiting.  Call your doctor with redness or swelling at the incision site, fluid leakage or wound separation.  Call your doctor if you're unable to tolerate food or have difficulty urinating.  Call your doctor if you have pain that is not relieved by your prescribed medications.  Notify your doctor with any other concerns.  Follow up with Dr. Rascon and Dr. Sheppard in two weeks

## 2022-10-25 NOTE — ASU DISCHARGE PLAN (ADULT/PEDIATRIC) - MEDICATION INSTRUCTIONS
Take Oxycodone as needed for breakthrough/severe pain; take senna as needed for constipation Take Tylenol 975mg every 6 hours and Ibuprofen 600mg every 6 hours, alternating every 3 hours.

## 2022-10-25 NOTE — ASU DISCHARGE PLAN (ADULT/PEDIATRIC) - NS MD DC FALL RISK RISK
For information on Fall & Injury Prevention, visit: [email or web address] OR  [email or web address] OR  https://www.cdc.gov/steadi/patient.html

## 2022-10-25 NOTE — BRIEF OPERATIVE NOTE - OPERATION/FINDINGS
s/p Lap PATTY/BSO per OB GYN, we extended the supra-umbilical incision and incorporated the umbilical hernia after reducing its contents. the fascia was then closed using running number 1 maxon  in a continous manner. The umbilicus was then re-approximated from the stalk to the fascia. the subderma layer was closed in layers and skin with 4-0 monocryl and dermabond.

## 2022-10-25 NOTE — BRIEF OPERATIVE NOTE - OPERATION/FINDINGS
Consistent with primary ventral hernia repair using #1 maxon. Skin closed with 4-0 monocryl with dermabond on top.

## 2022-10-25 NOTE — PATIENT PROFILE ADULT - FALL HARM RISK - UNIVERSAL INTERVENTIONS
Bed in lowest position, wheels locked, appropriate side rails in place/Call bell, personal items and telephone in reach/Instruct patient to call for assistance before getting out of bed or chair/Non-slip footwear when patient is out of bed/[place] to call system/Physically safe environment - no spills, clutter or unnecessary equipment/Purposeful Proactive Rounding/Room/bathroom lighting operational, light cord in reach

## 2022-10-25 NOTE — BRIEF OPERATIVE NOTE - NSICDXBRIEFPREOP_GEN_ALL_CORE_FT
PRE-OP DIAGNOSIS:  Ventral hernia 25-Oct-2022 16:21:15  Mini Helms  
PRE-OP DIAGNOSIS:  Fibroid uterus 25-Oct-2022 14:08:47  Aisha Lazaro

## 2022-10-25 NOTE — BRIEF OPERATIVE NOTE - OPERATION/FINDINGS
Cystoscopy at the end of the procedure showed intact bladder with no evidence of suture material. Good efflux of urine from bilateral UOs    Vistaseal and Interceed placed over surgical site Cervix dilated to 6cm with 1~12cm aborting leiomyoma. Lsc revealed ~16-18 week size fibroid uterus. Right IP adherent to uterus and severe distorted anatomy. Vaginal lesion noted and removed after amputation of uterus.   Cystoscopy at the end of the procedure showed intact bladder with no evidence of suture material. Good efflux of urine from bilateral UOs    Vistaseal and Interceed placed over surgical site

## 2022-10-26 VITALS
DIASTOLIC BLOOD PRESSURE: 70 MMHG | HEART RATE: 84 BPM | SYSTOLIC BLOOD PRESSURE: 155 MMHG | OXYGEN SATURATION: 98 % | RESPIRATION RATE: 16 BRPM

## 2022-10-26 LAB
ANION GAP SERPL CALC-SCNC: 12 MMOL/L — SIGNIFICANT CHANGE UP (ref 5–17)
BASOPHILS # BLD AUTO: 0.02 K/UL — SIGNIFICANT CHANGE UP (ref 0–0.2)
BASOPHILS NFR BLD AUTO: 0.2 % — SIGNIFICANT CHANGE UP (ref 0–2)
BUN SERPL-MCNC: 28 MG/DL — HIGH (ref 7–23)
CALCIUM SERPL-MCNC: 8.3 MG/DL — LOW (ref 8.4–10.5)
CHLORIDE SERPL-SCNC: 106 MMOL/L — SIGNIFICANT CHANGE UP (ref 96–108)
CO2 SERPL-SCNC: 21 MMOL/L — LOW (ref 22–31)
CREAT SERPL-MCNC: 1.82 MG/DL — HIGH (ref 0.5–1.3)
EGFR: 33 ML/MIN/1.73M2 — LOW
EOSINOPHIL # BLD AUTO: 0.01 K/UL — SIGNIFICANT CHANGE UP (ref 0–0.5)
EOSINOPHIL NFR BLD AUTO: 0.1 % — SIGNIFICANT CHANGE UP (ref 0–6)
GLUCOSE SERPL-MCNC: 188 MG/DL — HIGH (ref 70–99)
HCT VFR BLD CALC: 34.6 % — SIGNIFICANT CHANGE UP (ref 34.5–45)
HGB BLD-MCNC: 11 G/DL — LOW (ref 11.5–15.5)
IMM GRANULOCYTES NFR BLD AUTO: 0.3 % — SIGNIFICANT CHANGE UP (ref 0–0.9)
LYMPHOCYTES # BLD AUTO: 1.03 K/UL — SIGNIFICANT CHANGE UP (ref 1–3.3)
LYMPHOCYTES # BLD AUTO: 8.9 % — LOW (ref 13–44)
MCHC RBC-ENTMCNC: 29.2 PG — SIGNIFICANT CHANGE UP (ref 27–34)
MCHC RBC-ENTMCNC: 31.8 GM/DL — LOW (ref 32–36)
MCV RBC AUTO: 91.8 FL — SIGNIFICANT CHANGE UP (ref 80–100)
MONOCYTES # BLD AUTO: 1.41 K/UL — HIGH (ref 0–0.9)
MONOCYTES NFR BLD AUTO: 12.2 % — SIGNIFICANT CHANGE UP (ref 2–14)
NEUTROPHILS # BLD AUTO: 9.05 K/UL — HIGH (ref 1.8–7.4)
NEUTROPHILS NFR BLD AUTO: 78.3 % — HIGH (ref 43–77)
NRBC # BLD: 0 /100 WBCS — SIGNIFICANT CHANGE UP (ref 0–0)
PLATELET # BLD AUTO: 213 K/UL — SIGNIFICANT CHANGE UP (ref 150–400)
POTASSIUM SERPL-MCNC: 3.7 MMOL/L — SIGNIFICANT CHANGE UP (ref 3.5–5.3)
POTASSIUM SERPL-SCNC: 3.7 MMOL/L — SIGNIFICANT CHANGE UP (ref 3.5–5.3)
RBC # BLD: 3.77 M/UL — LOW (ref 3.8–5.2)
RBC # FLD: 13.4 % — SIGNIFICANT CHANGE UP (ref 10.3–14.5)
SODIUM SERPL-SCNC: 139 MMOL/L — SIGNIFICANT CHANGE UP (ref 135–145)
WBC # BLD: 11.56 K/UL — HIGH (ref 3.8–10.5)
WBC # FLD AUTO: 11.56 K/UL — HIGH (ref 3.8–10.5)

## 2022-10-26 PROCEDURE — C1765: CPT

## 2022-10-26 PROCEDURE — 80048 BASIC METABOLIC PNL TOTAL CA: CPT

## 2022-10-26 PROCEDURE — 82330 ASSAY OF CALCIUM: CPT

## 2022-10-26 PROCEDURE — 84295 ASSAY OF SERUM SODIUM: CPT

## 2022-10-26 PROCEDURE — 82435 ASSAY OF BLOOD CHLORIDE: CPT

## 2022-10-26 PROCEDURE — 82803 BLOOD GASES ANY COMBINATION: CPT

## 2022-10-26 PROCEDURE — 57135 EXCISION VAGINAL CYST/TUMOR: CPT

## 2022-10-26 PROCEDURE — 85027 COMPLETE CBC AUTOMATED: CPT

## 2022-10-26 PROCEDURE — 88302 TISSUE EXAM BY PATHOLOGIST: CPT

## 2022-10-26 PROCEDURE — 83605 ASSAY OF LACTIC ACID: CPT

## 2022-10-26 PROCEDURE — C9399: CPT

## 2022-10-26 PROCEDURE — C1889: CPT

## 2022-10-26 PROCEDURE — 88307 TISSUE EXAM BY PATHOLOGIST: CPT

## 2022-10-26 PROCEDURE — 84132 ASSAY OF SERUM POTASSIUM: CPT

## 2022-10-26 PROCEDURE — 82947 ASSAY GLUCOSE BLOOD QUANT: CPT

## 2022-10-26 PROCEDURE — 58544 LSH W/T/O UTERUS ABOVE 250 G: CPT

## 2022-10-26 PROCEDURE — 82962 GLUCOSE BLOOD TEST: CPT

## 2022-10-26 PROCEDURE — 49560: CPT | Mod: XP

## 2022-10-26 PROCEDURE — 15734 MUSCLE-SKIN GRAFT TRUNK: CPT | Mod: XP

## 2022-10-26 PROCEDURE — 85014 HEMATOCRIT: CPT

## 2022-10-26 PROCEDURE — 81025 URINE PREGNANCY TEST: CPT

## 2022-10-26 PROCEDURE — 85025 COMPLETE CBC W/AUTO DIFF WBC: CPT

## 2022-10-26 PROCEDURE — 85018 HEMOGLOBIN: CPT

## 2022-10-26 PROCEDURE — 88305 TISSUE EXAM BY PATHOLOGIST: CPT

## 2022-10-26 RX ORDER — HYDRALAZINE HCL 50 MG
50 TABLET ORAL DAILY
Refills: 0 | Status: DISCONTINUED | OUTPATIENT
Start: 2022-10-26 | End: 2022-11-08

## 2022-10-26 RX ORDER — IBUPROFEN 200 MG
1 TABLET ORAL
Qty: 0 | Refills: 0 | DISCHARGE
Start: 2022-10-26

## 2022-10-26 RX ORDER — LABETALOL HCL 100 MG
200 TABLET ORAL
Refills: 0 | Status: DISCONTINUED | OUTPATIENT
Start: 2022-10-26 | End: 2022-10-26

## 2022-10-26 RX ORDER — SODIUM CHLORIDE 9 MG/ML
3 INJECTION INTRAMUSCULAR; INTRAVENOUS; SUBCUTANEOUS EVERY 8 HOURS
Refills: 0 | Status: DISCONTINUED | OUTPATIENT
Start: 2022-10-26 | End: 2022-11-08

## 2022-10-26 RX ORDER — LABETALOL HCL 100 MG
600 TABLET ORAL THREE TIMES A DAY
Refills: 0 | Status: DISCONTINUED | OUTPATIENT
Start: 2022-10-26 | End: 2022-11-08

## 2022-10-26 RX ORDER — AMLODIPINE BESYLATE 2.5 MG/1
10 TABLET ORAL DAILY
Refills: 0 | Status: DISCONTINUED | OUTPATIENT
Start: 2022-10-26 | End: 2022-11-08

## 2022-10-26 RX ORDER — ACETAMINOPHEN 500 MG
3 TABLET ORAL
Qty: 0 | Refills: 0 | DISCHARGE
Start: 2022-10-26

## 2022-10-26 RX ORDER — OXYCODONE 5 MG/1
5 TABLET ORAL
Qty: 10 | Refills: 0 | Status: ACTIVE | COMMUNITY
Start: 2022-10-26 | End: 1900-01-01

## 2022-10-26 RX ADMIN — Medication 975 MILLIGRAM(S): at 06:30

## 2022-10-26 RX ADMIN — Medication 600 MILLIGRAM(S): at 06:00

## 2022-10-26 RX ADMIN — Medication 600 MILLIGRAM(S): at 09:56

## 2022-10-26 RX ADMIN — Medication 50 MILLIGRAM(S): at 09:55

## 2022-10-26 RX ADMIN — AMLODIPINE BESYLATE 10 MILLIGRAM(S): 2.5 TABLET ORAL at 09:39

## 2022-10-26 RX ADMIN — Medication 600 MILLIGRAM(S): at 05:05

## 2022-10-26 RX ADMIN — Medication 600 MILLIGRAM(S): at 10:00

## 2022-10-26 RX ADMIN — SODIUM CHLORIDE 3 MILLILITER(S): 9 INJECTION INTRAMUSCULAR; INTRAVENOUS; SUBCUTANEOUS at 06:00

## 2022-10-26 RX ADMIN — Medication 5 MILLIGRAM(S): at 05:10

## 2022-10-26 RX ADMIN — Medication 975 MILLIGRAM(S): at 06:00

## 2022-10-26 NOTE — PROGRESS NOTE ADULT - SUBJECTIVE AND OBJECTIVE BOX
SURGERY DAILY PROGRESS NOTE:     SUBJECTIVE/ROS: POD#1 lap PATTY/BSO and ventral hernia. HTN overnight. Recent BPs WNL. Pt passing gas, spontaneously voiding. Denies n/v. Patient seen and examined bedside on AM rounds.     OBJECTIVE:  Vital Signs Last 24 Hrs  T(C): 36.7 (26 Oct 2022 05:00), Max: 36.7 (26 Oct 2022 05:00)  T(F): 98.1 (26 Oct 2022 05:00), Max: 98.1 (26 Oct 2022 05:00)  HR: 84 (26 Oct 2022 10:00) (69 - 93)  BP: 174/79 (26 Oct 2022 09:15) (122/60 - 194/81)  BP(mean): 115 (26 Oct 2022 08:00) (86 - 117)  RR: 149 (26 Oct 2022 10:00) (14 - 149)  SpO2: 97% (26 Oct 2022 09:15) (95% - 100%)    Parameters below as of 26 Oct 2022 08:00  Patient On (Oxygen Delivery Method): room air    PHYSICAL EXAM:  Constitutional: NAD, resting comfortably in chair  Respiratory: non-labored breathing, patent airway  Gastrointestinal: abdomen soft, mild L flank and RUQ tenderness, nondistended, umbilical and port incisions with dermabond c/d/i  Extremities: warm  Neurological: intact                          11.0   11.56 )-----------( 213      ( 26 Oct 2022 06:09 )             34.6     10-26    139  |  106  |  28<H>  ----------------------------<  188<H>  3.7   |  21<L>  |  1.82<H>    Ca    8.3<L>      26 Oct 2022 06:09       I&O's Detail    25 Oct 2022 07:01  -  26 Oct 2022 07:00  --------------------------------------------------------  IN:    Lactated Ringers: 1200 mL    Oral Fluid: 300 mL  Total IN: 1500 mL    OUT:    Voided (mL): 1000 mL  Total OUT: 1000 mL    Total NET: 500 mL

## 2022-10-26 NOTE — PROGRESS NOTE ADULT - SUBJECTIVE AND OBJECTIVE BOX
GYN Progress Note POD#1    Patient seen and examined at bedside. BPs elevated overnight to 150s - 160s/60s-70s, Lopressor ordered. No acute complaints.  Pain well controlled. Patient is ambulating and tolerating regular diet. She is passing flatus and voiding spontaneously.   Denies CP, SOB, N/V, fevers, and chills.    Vital Signs Last 24 Hours  T(C): 36.7 (10-26-22 @ 05:00), Max: 36.7 (10-26-22 @ 05:00)  HR: 85 (10-26-22 @ 06:00) (69 - 93)  BP: 170/79 (10-26-22 @ 06:00) (122/60 - 179/79)  RR: 18 (10-26-22 @ 06:00) (15 - 18)  SpO2: 99% (10-26-22 @ 06:00) (95% - 100%)    I&O's Summary    25 Oct 2022 07:01  -  26 Oct 2022 06:37  --------------------------------------------------------  IN: 1500 mL / OUT: 1000 mL / NET: 500 mL        Physical Exam:  General: NAD  CV: RRR  Lungs: CTAB  Abdomen: soft, appropriately-tender, softly distended, normoactive bowel sounds  Incision: LSC port sites and umbilical incision c/d/i, dermabond in place   : no bleeding on pad  Ext: no pain or swelling     Labs:                        11.0   11.56 )-----------( 213      ( 26 Oct 2022 06:09 )             34.6   baso 0.2    eos 0.1    imm gran 0.3    lymph 8.9    mono 12.2   poly 78.3                         10.8   14.39 )-----------( 193      ( 25 Oct 2022 15:49 )             33.2   baso x      eos x      imm gran x      lymph x      mono x      poly x          MEDICATIONS  (STANDING):  acetaminophen     Tablet .. 975 milliGRAM(s) Oral every 6 hours  cefoTEtan  IVPB 2 Gram(s) IV Intermittent once  heparin   Injectable 5000 Unit(s) SubCutaneous every 12 hours  ibuprofen  Tablet. 600 milliGRAM(s) Oral every 6 hours  metoprolol tartrate Injectable 5 milliGRAM(s) IV Push every 6 hours  senna 2 Tablet(s) Oral at bedtime  simethicone 80 milliGRAM(s) Chew daily  sodium chloride 0.9% lock flush 3 milliLiter(s) IV Push every 8 hours    MEDICATIONS  (PRN):  HYDROmorphone  Injectable 0.5 milliGRAM(s) IV Push every 10 minutes PRN Moderate Pain (4 - 6)  HYDROmorphone  Injectable 1 milliGRAM(s) IV Push every 10 minutes PRN Severe Pain (7 - 10)  ondansetron Injectable 4 milliGRAM(s) IV Push every 6 hours PRN Nausea and/or Vomiting  ondansetron Injectable 4 milliGRAM(s) IV Push once PRN Nausea and/or Vomiting  ondansetron Injectable 8 milliGRAM(s) IV Push once PRN Nausea and/or Vomiting  oxyCODONE    IR 5 milliGRAM(s) Oral every 6 hours PRN Severe Pain (7 - 10)       GYN Progress Note POD#1    Patient seen and examined at bedside. Patient w/ h/o HTN and BPs elevated overnight to 150s - 160s/60s-70s, Lopressor IV given x2 (last at 5am). No acute complaints.  Pain well controlled. Patient is ambulating and tolerating regular diet. She is passing flatus and voiding spontaneously. Denies CP, SOB, N/V, fevers, and chills.    Vital Signs Last 24 Hours  T(C): 36.7 (10-26-22 @ 05:00), Max: 36.7 (10-26-22 @ 05:00)  HR: 85 (10-26-22 @ 06:00) (69 - 93)  BP: 170/79 (10-26-22 @ 06:00) (122/60 - 179/79)  RR: 18 (10-26-22 @ 06:00) (15 - 18)  SpO2: 99% (10-26-22 @ 06:00) (95% - 100%)    I&O's Summary    25 Oct 2022 07:01  -  26 Oct 2022 06:37  --------------------------------------------------------  IN: 1500 mL / OUT: 1000 mL / NET: 500 mL      Physical Exam:  General: NAD  CV: RRR  Lungs: CTAB  Abdomen: soft, appropriately-tender, softly distended, normoactive bowel sounds  Incision: LSC port sites and umbilical incision c/d/i - total of 3 w/ dermabond in place   : no bleeding on pad  Ext: no pain or swelling     Labs:                        11.0   11.56 )-----------( 213      ( 26 Oct 2022 06:09 )             34.6   baso 0.2    eos 0.1    imm gran 0.3    lymph 8.9    mono 12.2   poly 78.3                         10.8   14.39 )-----------( 193      ( 25 Oct 2022 15:49 )             33.2   baso x      eos x      imm gran x      lymph x      mono x      poly x          MEDICATIONS  (STANDING):  acetaminophen     Tablet .. 975 milliGRAM(s) Oral every 6 hours  cefoTEtan  IVPB 2 Gram(s) IV Intermittent once  heparin   Injectable 5000 Unit(s) SubCutaneous every 12 hours  ibuprofen  Tablet. 600 milliGRAM(s) Oral every 6 hours  metoprolol tartrate Injectable 5 milliGRAM(s) IV Push every 6 hours  senna 2 Tablet(s) Oral at bedtime  simethicone 80 milliGRAM(s) Chew daily  sodium chloride 0.9% lock flush 3 milliLiter(s) IV Push every 8 hours    MEDICATIONS  (PRN):  HYDROmorphone  Injectable 0.5 milliGRAM(s) IV Push every 10 minutes PRN Moderate Pain (4 - 6)  HYDROmorphone  Injectable 1 milliGRAM(s) IV Push every 10 minutes PRN Severe Pain (7 - 10)  ondansetron Injectable 4 milliGRAM(s) IV Push every 6 hours PRN Nausea and/or Vomiting  ondansetron Injectable 4 milliGRAM(s) IV Push once PRN Nausea and/or Vomiting  ondansetron Injectable 8 milliGRAM(s) IV Push once PRN Nausea and/or Vomiting  oxyCODONE    IR 5 milliGRAM(s) Oral every 6 hours PRN Severe Pain (7 - 10)

## 2022-10-26 NOTE — PROGRESS NOTE ADULT - ASSESSMENT
51y w/ h/o HTN, DM, CHF and CVA (2019) s/p 10/25 lap PATTY/BSO and ventral hernia. Recovering well.    PLAN  - Monitor bowel function, I&Os  - Monitor vitals  - Care per primary    [facility]  [id number]

## 2022-10-26 NOTE — PROGRESS NOTE ADULT - ATTENDING COMMENTS
Ms. Aviles is POD #1 s/p lsc Supracervical Hysterectomy, LS, Right Salpingo-Oophorectomy, cysto, and ventral hernia repair (gen surg= Dr. Sheppard), KNA=152. Pt 23 hours obs for monitoring in the setting of numerous medical comorbidities including HTN, CVA, T2DM, CKD. No acute events overnight. Pt had elevated BPs and received IV lopressor. This morning home BP meds to be resumed. AM H/H reviewed 11/34. Cr. also noted to be 1.82 which is stable. Pt tolerating reg diet, ambulating, voiding spontaneously, pain well controlled. Abd is soft and appropriately tender. Incisions c/d/i. Discharge precautions reviewed. All questions answered. Pt to be discharged.     D/w Dr. Abiodun Cervantes, pgy-6

## 2022-10-26 NOTE — PROGRESS NOTE ADULT - ASSESSMENT
A/P: 51y POD#1 s/p HTN, DM, CHF and CVA (2019) s/p LSC URVASHI, BS, RO, MAGDA, Cystoscopy, Repair of cervical lac and repair of umbilical hernia (by gen surg) for myomatous uterus and AUB. BPs elevated overnight but patient otherwise stable and meeting appropriate milestones in immediate post-op period.     Neuro: Pain well controlled with PO Tylenol, Motrin, Oxy PRN  CV: Hx of CHF, CVA, and HTN   - BPs elevated overnight, Lopressor 5mg IV administered twice. Home meds to be resumed upon discharge: Labetalol, Amlodipine, HCTZ.  - hx of CHF and CVA in 2019; management of HTN as above. On Rosuvastatin and ASA at home  - f/u outpatient with cardio  Pulm: Saturating well on room air, encourage oob/amb  GI: Continue carb consistent regular diet. Senna, Simethicone, Zofran PRN   : Voiding spontaneously   Heme: c/w HSQ and SCDs for DVT ppx  - POD 1 H/H appropriate for EBL  - Hct: 43.6 (PST) ->33.2 (Immediate post-op) -> 34.6 (POD #1)  FEN: Hydrating PO. SLIV  ID: Afebrile  Endo: Hx of T2DM  - Home meds: Basaglar and Ozempic   - POC glucose testing   Dispo: Plan for ASU discharge     GERI Garcia, PGY2  A/P: 51y POD#1 s/p HTN, DM, CHF and CVA (2019) s/p LSC URVASHI, BS, RO, MAGDA, Cystoscopy, Repair of cervical lac and repair of umbilical hernia (by gen surg) for myomatous uterus and AUB. BPs elevated overnight but patient otherwise stable and meeting appropriate milestones in immediate post-op period.     Neuro: Pain well controlled with PO Tylenol, Motrin, Oxy PRN  CV: Hx of CHF, CVA, and HTN   - BPs elevated overnight, Lopressor 5mg IV administered twice. Home meds to be resumed upon discharge: Labetalol, Amlodipine, HCTZ.  - hx of CHF and CVA in 2019; management of HTN as above. On Rosuvastatin and ASA at home  - f/u outpatient with cardio  Pulm: Saturating well on room air, encourage oob/amb  GI: Continue carb consistent regular diet. Senna, Simethicone, Zofran PRN   : Voiding spontaneously   - Cr elevated at PSTs; stable at 1.8 in post-op period   Heme: c/w HSQ and SCDs for DVT ppx  - POD 1 H/H appropriate for EBL  - Hct: 43.6 (PST) ->33.2 (Immediate post-op) -> 34.6 (POD #1)  FEN: Hydrating PO. SLIV  ID: Afebrile  Endo: Hx of T2DM  - Home meds: Basaglar and Ozempic   - POC glucose testing   Dispo: Plan for ASU discharge     GERI Garcia, PGY2  A/P: 51y w/ h/o HTN, DM, CHF and CVA (2019) POD#1 s/p LSC URVASHI, BS, RO, MAGDA, Cystoscopy, Repair of cervical lac and repair of umbilical hernia (by gen surg) for myomatous uterus and AUB. BPs elevated overnight but patient otherwise stable and meeting appropriate milestones in immediate post-op period.     Neuro: Pain well controlled with PO Tylenol, Motrin, Oxy PRN  CV: Hx of CHF, CVA, and HTN   - BPs elevated overnight, Lopressor 5mg IV administered twice. Home meds to be resumed upon discharge: Labetalol, Amlodipine, HCTZ.  - hx of CHF and CVA in 2019; management of HTN as above. On Rosuvastatin and ASA at home  - f/u outpatient with cardio  Pulm: Saturating well on room air, encourage oob/amb  GI: Continue carb consistent regular diet. Senna, Simethicone, Zofran PRN   : Voiding spontaneously   - Cr elevated at PSTs; stable at 1.8 in post-op period   Heme: c/w HSQ and SCDs for DVT ppx  - POD 1 H/H appropriate for EBL  - Hct: 43.6 (PST) ->33.2 (Immediate post-op) -> 34.6 (POD #1)  FEN: Hydrating PO. SLIV  ID: Afebrile  Endo: Hx of T2DM  - Home meds: Basaglar and Ozempic   - POC glucose testing   Dispo: Plan for ASU discharge     GERI Garcia, PGY2

## 2022-10-28 LAB — SURGICAL PATHOLOGY STUDY: SIGNIFICANT CHANGE UP

## 2022-11-09 ENCOUNTER — APPOINTMENT (OUTPATIENT)
Dept: OBGYN | Facility: CLINIC | Age: 52
End: 2022-11-09

## 2022-11-09 VITALS — SYSTOLIC BLOOD PRESSURE: 141 MMHG | BODY MASS INDEX: 35.36 KG/M2 | WEIGHT: 206 LBS | DIASTOLIC BLOOD PRESSURE: 80 MMHG

## 2022-11-09 DIAGNOSIS — R10.9 UNSPECIFIED ABDOMINAL PAIN: ICD-10-CM

## 2022-11-09 DIAGNOSIS — N76.0 ACUTE VAGINITIS: ICD-10-CM

## 2022-11-09 DIAGNOSIS — B96.89 ACUTE VAGINITIS: ICD-10-CM

## 2022-11-09 PROCEDURE — 99024 POSTOP FOLLOW-UP VISIT: CPT

## 2022-11-11 ENCOUNTER — APPOINTMENT (OUTPATIENT)
Dept: ULTRASOUND IMAGING | Facility: IMAGING CENTER | Age: 52
End: 2022-11-11

## 2022-11-11 ENCOUNTER — APPOINTMENT (OUTPATIENT)
Dept: OBGYN | Facility: CLINIC | Age: 52
End: 2022-11-11

## 2022-11-11 PROBLEM — N76.0 BV (BACTERIAL VAGINOSIS): Status: RESOLVED | Noted: 2022-11-11 | Resolved: 2022-12-11

## 2022-11-11 LAB
ALBUMIN SERPL ELPH-MCNC: 4.2 G/DL
ALBUMIN SERPL ELPH-MCNC: 4.2 G/DL
ALP BLD-CCNC: 200 U/L
ALP BLD-CCNC: 203 U/L
ALT SERPL-CCNC: 29 U/L
ALT SERPL-CCNC: 33 U/L
AMYLASE/CREAT SERPL: 100 U/L
AMYLASE/CREAT SERPL: 117 U/L
ANION GAP SERPL CALC-SCNC: 11 MMOL/L
ANION GAP SERPL CALC-SCNC: 15 MMOL/L
AST SERPL-CCNC: 25 U/L
AST SERPL-CCNC: 29 U/L
BACTERIA UR CULT: NORMAL
BASOPHILS # BLD AUTO: 0.03 K/UL
BASOPHILS # BLD AUTO: 0.03 K/UL
BASOPHILS NFR BLD AUTO: 0.3 %
BASOPHILS NFR BLD AUTO: 0.5 %
BILIRUB SERPL-MCNC: <0.2 MG/DL
BILIRUB SERPL-MCNC: <0.2 MG/DL
BUN SERPL-MCNC: 29 MG/DL
BUN SERPL-MCNC: 30 MG/DL
CALCIUM SERPL-MCNC: 10.1 MG/DL
CALCIUM SERPL-MCNC: 10.1 MG/DL
CANDIDA VAG CYTO: NOT DETECTED
CHLORIDE SERPL-SCNC: 101 MMOL/L
CHLORIDE SERPL-SCNC: 99 MMOL/L
CO2 SERPL-SCNC: 24 MMOL/L
CO2 SERPL-SCNC: 25 MMOL/L
CREAT SERPL-MCNC: 2.2 MG/DL
CREAT SERPL-MCNC: 2.46 MG/DL
EGFR: 23 ML/MIN/1.73M2
EGFR: 26 ML/MIN/1.73M2
EOSINOPHIL # BLD AUTO: 0.14 K/UL
EOSINOPHIL # BLD AUTO: 0.2 K/UL
EOSINOPHIL NFR BLD AUTO: 1.9 %
EOSINOPHIL NFR BLD AUTO: 2.4 %
G VAGINALIS+PREV SP MTYP VAG QL MICRO: DETECTED
GLUCOSE SERPL-MCNC: 113 MG/DL
GLUCOSE SERPL-MCNC: 86 MG/DL
HCT VFR BLD CALC: 34.8 %
HCT VFR BLD CALC: 37.6 %
HGB BLD-MCNC: 10.9 G/DL
HGB BLD-MCNC: 11.7 G/DL
IMM GRANULOCYTES NFR BLD AUTO: 0.3 %
IMM GRANULOCYTES NFR BLD AUTO: 0.5 %
LPL SERPL-CCNC: 61 U/L
LPL SERPL-CCNC: 63 U/L
LYMPHOCYTES # BLD AUTO: 1.38 K/UL
LYMPHOCYTES # BLD AUTO: 1.68 K/UL
LYMPHOCYTES NFR BLD AUTO: 15.7 %
LYMPHOCYTES NFR BLD AUTO: 23.6 %
MAN DIFF?: NORMAL
MAN DIFF?: NORMAL
MCHC RBC-ENTMCNC: 28.2 PG
MCHC RBC-ENTMCNC: 28.8 PG
MCHC RBC-ENTMCNC: 31.1 GM/DL
MCHC RBC-ENTMCNC: 31.3 GM/DL
MCV RBC AUTO: 90.2 FL
MCV RBC AUTO: 92.6 FL
MONOCYTES # BLD AUTO: 0.52 K/UL
MONOCYTES # BLD AUTO: 0.89 K/UL
MONOCYTES NFR BLD AUTO: 8.3 %
MONOCYTES NFR BLD AUTO: 8.9 %
NEUTROPHILS # BLD AUTO: 3.75 K/UL
NEUTROPHILS # BLD AUTO: 7.85 K/UL
NEUTROPHILS NFR BLD AUTO: 64.3 %
NEUTROPHILS NFR BLD AUTO: 73.3 %
PLATELET # BLD AUTO: 290 K/UL
PLATELET # BLD AUTO: 314 K/UL
POTASSIUM SERPL-SCNC: 4 MMOL/L
POTASSIUM SERPL-SCNC: 4.4 MMOL/L
PROT SERPL-MCNC: 6.7 G/DL
PROT SERPL-MCNC: 6.9 G/DL
RBC # BLD: 3.86 M/UL
RBC # BLD: 4.06 M/UL
RBC # FLD: 12.9 %
RBC # FLD: 12.9 %
SODIUM SERPL-SCNC: 137 MMOL/L
SODIUM SERPL-SCNC: 139 MMOL/L
T VAGINALIS VAG QL WET PREP: NOT DETECTED
WBC # FLD AUTO: 10.7 K/UL
WBC # FLD AUTO: 5.84 K/UL

## 2022-11-11 PROCEDURE — 36415 COLL VENOUS BLD VENIPUNCTURE: CPT

## 2022-11-11 RX ORDER — OXYCODONE HYDROCHLORIDE 5 MG/1
1 TABLET ORAL
Qty: 6 | Refills: 0
Start: 2022-11-11

## 2022-11-15 NOTE — PLAN
[FreeTextEntry1] : 50 yo presents for a post-op evaluation, s/p ventral hernia repair.\par \par -Pt plans on returning to work after 8 weeks, using pto and vacation time\par -incision care discussed\par -Message to Dr. GEOFF Sheppard about when swimming can resume\par -cmp, amylase, lipase, cbc today

## 2022-11-15 NOTE — HISTORY OF PRESENT ILLNESS
[Clean/Dry/Intact] : clean, dry and intact [Erythema] : not erythematous [Swelling] : not swollen [Dehiscence] : not dehisced [de-identified] : Ventral hernia repair [de-identified] : 50 yo presents for a post-op evaluation, s/p ventral hernia repair. Pt reports LUQ pulling that extends down to LLQ, she also reports some hard stools and pressure with urination, decreased appetite.\par \par Pathology findings:\par \par 1. Vaginal lesion; biopsy:\par - Fibroepithelial (stromal) polyp.\par \par 2. Uterus and fibroids and left fallopian tube; hysterectomy and left\par salpingectomy:\par - Adenomyosis and leiomyomata.\par - Weakly proliferative endometrium.\par - Benign fallopian tube.\par \par 3. Fallopian tube and ovary, right; salpingo-oophorectomy:\par - Benign ovary.\par - Fallopian tube with endometriosis.\par \par 4. Hernia sac and contents; excision:\par - Mesothelial lined fibroadipose tissue.\par Verified by: Juni Linn MD\par (Electronic Signature)\par Reported on: 10/28/22 11:55 EDT, [facility], [address] [address]\par Phone: [phone]   Fax: [phone]\par \par Intraoperative findings:\par Procedure: Ventral hernia repair for ventral hernia\par \par - Operative Findings	\par Consistent with primary ventral hernia repair using #1 maxon. Skin closed with 4-0 monocryl with dermabond on top. \par \par Evidence of Infection or Abscess: \par - Evidence of infection or abscess identified at the start or during the \par surgical procedure:	No \par \par Specimens/Blood Loss/IV/Output/Protocol/VTE: \par Specimens/Blood Loss/IV/Output/Protocol/VTE: \par - Specimens	hernia sac and contents \par - Estimated Blood Loss	5 milliLiter(s) \par - Antibiotic Protocol	Followed protocol \par - Venous Thromboembolism Prophylaxis Therapy	venodynes \par \par \par Other Details/Condition Post op/Disposition: \par - Condition Post op	stable \par - Disposition	\par PACU to floor \par \par Electronic Signatures: \par Mark Sheppard)  (Signed 25-Oct-2022 16:56) \par 	Authored: Operative Findings \par Mini Helms)  (Signed 25-Oct-2022 16:22) \par 	Authored: General, Pre-Op Diagnosis, Post-Op Diagnosis and Procedure, \par Operative Findings, Specimens/Blood Loss/IV/Output/Protocol/VTE, Other \par Details/Condition Post op/Disposition \par \par \par Last Updated: 25-Oct-2022 16:56 by Mark Sheppard) [de-identified] : umbilicus with one small area open

## 2022-12-21 ENCOUNTER — APPOINTMENT (OUTPATIENT)
Dept: OBGYN | Facility: CLINIC | Age: 52
End: 2022-12-21

## 2022-12-21 VITALS — DIASTOLIC BLOOD PRESSURE: 113 MMHG | SYSTOLIC BLOOD PRESSURE: 185 MMHG | BODY MASS INDEX: 34.85 KG/M2 | WEIGHT: 203 LBS

## 2022-12-21 PROCEDURE — 99024 POSTOP FOLLOW-UP VISIT: CPT

## 2022-12-22 NOTE — HISTORY OF PRESENT ILLNESS
[Clean/Dry/Intact] : clean, dry and intact [Pain is well-controlled] : pain is well-controlled [Healed] : healed [Pathology reviewed] : pathology reviewed [None] : no vaginal bleeding [Normal] : normal [Fever] : no fever [Chills] : no chills [Nausea] : no nausea [Erythema] : not erythematous [Swelling] : not swollen [Dehiscence] : not dehisced [de-identified] : Ventral hernia repair [de-identified] : abd soft, nt, nd; cervix normal appearing w/ nabothian cyst on anterior cervix [de-identified] : 50 yo s/p ventral hernia repair 10/25/22. Doing well.\par \par Pathology findings:\par \par 1. Vaginal lesion; biopsy:\par - Fibroepithelial (stromal) polyp.\par \par 2. Uterus and fibroids and left fallopian tube; hysterectomy and left\par salpingectomy:\par - Adenomyosis and leiomyomata.\par - Weakly proliferative endometrium.\par - Benign fallopian tube.\par \par 3. Fallopian tube and ovary, right; salpingo-oophorectomy:\par - Benign ovary.\par - Fallopian tube with endometriosis.\par \par 4. Hernia sac and contents; excision:\par - Mesothelial lined fibroadipose tissue.\par Verified by: Juni Linn MD\par (Electronic Signature)\par Reported on: 10/28/22 11:55 EDT, [facility], [address] [facility] [address] [place] [address]\par Phone: [phone]   Fax: [phone]\par \par Intraoperative findings:\par Procedure: Ventral hernia repair for ventral hernia\par \par - Operative Findings	\par Consistent with primary ventral hernia repair using #1 maxon. Skin closed with 4-0 monocryl with dermabond on top. \par \par Evidence of Infection or Abscess: \par - Evidence of infection or abscess identified at the start or during the \par surgical procedure:	No \par \par Specimens/Blood Loss/IV/Output/Protocol/VTE: \par Specimens/Blood Loss/IV/Output/Protocol/VTE: \par - Specimens	hernia sac and contents \par - Estimated Blood Loss	5 milliLiter(s) \par - Antibiotic Protocol	Followed protocol \par - Venous Thromboembolism Prophylaxis Therapy	venodynes \par \par \par Other Details/Condition Post op/Disposition: \par - Condition Post op	stable \par - Disposition	\par PACU to floor \par \par Electronic Signatures: \par Mark Sheppard)  (Signed 25-Oct-2022 16:56) \par 	Authored: Operative Findings \par Mini Helms)  (Signed 25-Oct-2022 16:22) \par 	Authored: General, Pre-Op Diagnosis, Post-Op Diagnosis and Procedure, \par Operative Findings, Specimens/Blood Loss/IV/Output/Protocol/VTE, Other \par Details/Condition Post op/Disposition \par \par \par Last Updated: 25-Oct-2022 16:56 by Mark Sheppard)

## 2022-12-22 NOTE — END OF VISIT
[FreeTextEntry3] : I, Fredy White, acted as a scribe on behalf of Dr. Soledad Rascon on 12/21/2022 .\par \par All medical entries made by the scribe were at my, Dr. Soledad Rascon's, direction and personally dictated by me on 12/21/2022. I have reviewed the chart and agree that the record accurately reflects my personal performance of the history, physical exam, assessment and plan. I have also personally directed, reviewed, and agreed with the chart.

## 2022-12-22 NOTE — ASSESSMENT
[Doing Well] : is doing well [Excellent Pain Control] : has excellent pain control [No Sign of Infection] : is showing no signs of infection [de-identified] : 50 yo s/p ventral hernia repair 10/25/22. Stable.

## 2022-12-22 NOTE — PLAN
[FreeTextEntry1] : 52 yo s/p ventral hernia repair.\par \par -routine postop care\par -clearance for all activities\par -rto few months for annual

## 2023-02-23 ENCOUNTER — INPATIENT (INPATIENT)
Facility: HOSPITAL | Age: 53
LOS: 4 days | Discharge: HOME | End: 2023-02-28
Attending: INTERNAL MEDICINE | Admitting: INTERNAL MEDICINE
Payer: COMMERCIAL

## 2023-02-23 VITALS
SYSTOLIC BLOOD PRESSURE: 220 MMHG | RESPIRATION RATE: 18 BRPM | DIASTOLIC BLOOD PRESSURE: 105 MMHG | HEART RATE: 89 BPM | TEMPERATURE: 98 F | OXYGEN SATURATION: 98 %

## 2023-02-23 DIAGNOSIS — O03.9 COMPLETE OR UNSPECIFIED SPONTANEOUS ABORTION WITHOUT COMPLICATION: Chronic | ICD-10-CM

## 2023-02-23 DIAGNOSIS — Z98.890 OTHER SPECIFIED POSTPROCEDURAL STATES: Chronic | ICD-10-CM

## 2023-02-23 DIAGNOSIS — D25.9 LEIOMYOMA OF UTERUS, UNSPECIFIED: Chronic | ICD-10-CM

## 2023-02-23 DIAGNOSIS — Z33.2 ENCOUNTER FOR ELECTIVE TERMINATION OF PREGNANCY: Chronic | ICD-10-CM

## 2023-02-23 LAB
ALBUMIN SERPL ELPH-MCNC: 4 G/DL — SIGNIFICANT CHANGE UP (ref 3.3–5)
ALP SERPL-CCNC: 134 U/L — HIGH (ref 40–120)
ALT FLD-CCNC: 36 U/L — HIGH (ref 4–33)
ANION GAP SERPL CALC-SCNC: 6 MMOL/L — LOW (ref 7–14)
ANION GAP SERPL CALC-SCNC: 9 MMOL/L — SIGNIFICANT CHANGE UP (ref 7–14)
APPEARANCE UR: CLEAR — SIGNIFICANT CHANGE UP
AST SERPL-CCNC: 33 U/L — HIGH (ref 4–32)
BACTERIA # UR AUTO: ABNORMAL
BASOPHILS # BLD AUTO: 0.02 K/UL — SIGNIFICANT CHANGE UP (ref 0–0.2)
BASOPHILS NFR BLD AUTO: 0.3 % — SIGNIFICANT CHANGE UP (ref 0–2)
BILIRUB SERPL-MCNC: 0.3 MG/DL — SIGNIFICANT CHANGE UP (ref 0.2–1.2)
BILIRUB UR-MCNC: NEGATIVE — SIGNIFICANT CHANGE UP
BUN SERPL-MCNC: 30 MG/DL — HIGH (ref 7–23)
BUN SERPL-MCNC: 31 MG/DL — HIGH (ref 7–23)
CALCIUM SERPL-MCNC: 10.4 MG/DL — SIGNIFICANT CHANGE UP (ref 8.4–10.5)
CALCIUM SERPL-MCNC: 10.6 MG/DL — HIGH (ref 8.4–10.5)
CHLORIDE SERPL-SCNC: 104 MMOL/L — SIGNIFICANT CHANGE UP (ref 98–107)
CHLORIDE SERPL-SCNC: 105 MMOL/L — SIGNIFICANT CHANGE UP (ref 98–107)
CO2 SERPL-SCNC: 24 MMOL/L — SIGNIFICANT CHANGE UP (ref 22–31)
CO2 SERPL-SCNC: 27 MMOL/L — SIGNIFICANT CHANGE UP (ref 22–31)
COLOR SPEC: SIGNIFICANT CHANGE UP
CREAT SERPL-MCNC: 2.07 MG/DL — HIGH (ref 0.5–1.3)
CREAT SERPL-MCNC: 2.12 MG/DL — HIGH (ref 0.5–1.3)
DIFF PNL FLD: NEGATIVE — SIGNIFICANT CHANGE UP
EGFR: 27 ML/MIN/1.73M2 — LOW
EGFR: 28 ML/MIN/1.73M2 — LOW
EOSINOPHIL # BLD AUTO: 0.18 K/UL — SIGNIFICANT CHANGE UP (ref 0–0.5)
EOSINOPHIL NFR BLD AUTO: 3 % — SIGNIFICANT CHANGE UP (ref 0–6)
EPI CELLS # UR: 10 /HPF — HIGH (ref 0–5)
FLUAV AG NPH QL: SIGNIFICANT CHANGE UP
FLUBV AG NPH QL: SIGNIFICANT CHANGE UP
GLUCOSE SERPL-MCNC: 121 MG/DL — HIGH (ref 70–99)
GLUCOSE SERPL-MCNC: 136 MG/DL — HIGH (ref 70–99)
GLUCOSE UR QL: NEGATIVE — SIGNIFICANT CHANGE UP
HCT VFR BLD CALC: 43.6 % — SIGNIFICANT CHANGE UP (ref 34.5–45)
HGB BLD-MCNC: 13.5 G/DL — SIGNIFICANT CHANGE UP (ref 11.5–15.5)
HYALINE CASTS # UR AUTO: 0 /LPF — SIGNIFICANT CHANGE UP (ref 0–7)
IANC: 4 K/UL — SIGNIFICANT CHANGE UP (ref 1.8–7.4)
IMM GRANULOCYTES NFR BLD AUTO: 0.5 % — SIGNIFICANT CHANGE UP (ref 0–0.9)
KETONES UR-MCNC: NEGATIVE — SIGNIFICANT CHANGE UP
LEUKOCYTE ESTERASE UR-ACNC: ABNORMAL
LIDOCAIN IGE QN: 70 U/L — HIGH (ref 7–60)
LYMPHOCYTES # BLD AUTO: 1.21 K/UL — SIGNIFICANT CHANGE UP (ref 1–3.3)
LYMPHOCYTES # BLD AUTO: 20.2 % — SIGNIFICANT CHANGE UP (ref 13–44)
MCHC RBC-ENTMCNC: 26.9 PG — LOW (ref 27–34)
MCHC RBC-ENTMCNC: 31 GM/DL — LOW (ref 32–36)
MCV RBC AUTO: 87 FL — SIGNIFICANT CHANGE UP (ref 80–100)
MONOCYTES # BLD AUTO: 0.56 K/UL — SIGNIFICANT CHANGE UP (ref 0–0.9)
MONOCYTES NFR BLD AUTO: 9.3 % — SIGNIFICANT CHANGE UP (ref 2–14)
NEUTROPHILS # BLD AUTO: 4 K/UL — SIGNIFICANT CHANGE UP (ref 1.8–7.4)
NEUTROPHILS NFR BLD AUTO: 66.7 % — SIGNIFICANT CHANGE UP (ref 43–77)
NITRITE UR-MCNC: NEGATIVE — SIGNIFICANT CHANGE UP
NRBC # BLD: 0 /100 WBCS — SIGNIFICANT CHANGE UP (ref 0–0)
NRBC # FLD: 0 K/UL — SIGNIFICANT CHANGE UP (ref 0–0)
PH UR: 6.5 — SIGNIFICANT CHANGE UP (ref 5–8)
PLATELET # BLD AUTO: 187 K/UL — SIGNIFICANT CHANGE UP (ref 150–400)
POTASSIUM SERPL-MCNC: 4.1 MMOL/L — SIGNIFICANT CHANGE UP (ref 3.5–5.3)
POTASSIUM SERPL-MCNC: 4.2 MMOL/L — SIGNIFICANT CHANGE UP (ref 3.5–5.3)
POTASSIUM SERPL-SCNC: 4.1 MMOL/L — SIGNIFICANT CHANGE UP (ref 3.5–5.3)
POTASSIUM SERPL-SCNC: 4.2 MMOL/L — SIGNIFICANT CHANGE UP (ref 3.5–5.3)
PROT SERPL-MCNC: 6.7 G/DL — SIGNIFICANT CHANGE UP (ref 6–8.3)
PROT UR-MCNC: ABNORMAL
RBC # BLD: 5.01 M/UL — SIGNIFICANT CHANGE UP (ref 3.8–5.2)
RBC # FLD: 14.1 % — SIGNIFICANT CHANGE UP (ref 10.3–14.5)
RBC CASTS # UR COMP ASSIST: 1 /HPF — SIGNIFICANT CHANGE UP (ref 0–4)
RSV RNA NPH QL NAA+NON-PROBE: SIGNIFICANT CHANGE UP
SARS-COV-2 RNA SPEC QL NAA+PROBE: SIGNIFICANT CHANGE UP
SODIUM SERPL-SCNC: 137 MMOL/L — SIGNIFICANT CHANGE UP (ref 135–145)
SODIUM SERPL-SCNC: 138 MMOL/L — SIGNIFICANT CHANGE UP (ref 135–145)
SP GR SPEC: 1.02 — SIGNIFICANT CHANGE UP (ref 1.01–1.05)
TROPONIN T, HIGH SENSITIVITY RESULT: 51 NG/L — HIGH
TROPONIN T, HIGH SENSITIVITY RESULT: 54 NG/L — CRITICAL HIGH
UROBILINOGEN FLD QL: SIGNIFICANT CHANGE UP
WBC # BLD: 6 K/UL — SIGNIFICANT CHANGE UP (ref 3.8–10.5)
WBC # FLD AUTO: 6 K/UL — SIGNIFICANT CHANGE UP (ref 3.8–10.5)
WBC UR QL: 18 /HPF — HIGH (ref 0–5)

## 2023-02-23 PROCEDURE — 99223 1ST HOSP IP/OBS HIGH 75: CPT

## 2023-02-23 RX ORDER — ASPIRIN/CALCIUM CARB/MAGNESIUM 324 MG
81 TABLET ORAL DAILY
Refills: 0 | Status: DISCONTINUED | OUTPATIENT
Start: 2023-02-23 | End: 2023-02-28

## 2023-02-23 RX ORDER — DEXTROSE 50 % IN WATER 50 %
25 SYRINGE (ML) INTRAVENOUS ONCE
Refills: 0 | Status: DISCONTINUED | OUTPATIENT
Start: 2023-02-23 | End: 2023-02-28

## 2023-02-23 RX ORDER — HYDRALAZINE HCL 50 MG
50 TABLET ORAL THREE TIMES A DAY
Refills: 0 | Status: DISCONTINUED | OUTPATIENT
Start: 2023-02-23 | End: 2023-02-28

## 2023-02-23 RX ORDER — INSULIN LISPRO 100/ML
VIAL (ML) SUBCUTANEOUS
Refills: 0 | Status: DISCONTINUED | OUTPATIENT
Start: 2023-02-23 | End: 2023-02-28

## 2023-02-23 RX ORDER — ACETAMINOPHEN 500 MG
650 TABLET ORAL ONCE
Refills: 0 | Status: COMPLETED | OUTPATIENT
Start: 2023-02-23 | End: 2023-02-23

## 2023-02-23 RX ORDER — ATORVASTATIN CALCIUM 80 MG/1
20 TABLET, FILM COATED ORAL AT BEDTIME
Refills: 0 | Status: DISCONTINUED | OUTPATIENT
Start: 2023-02-23 | End: 2023-02-25

## 2023-02-23 RX ORDER — AMLODIPINE BESYLATE 2.5 MG/1
10 TABLET ORAL DAILY
Refills: 0 | Status: DISCONTINUED | OUTPATIENT
Start: 2023-02-23 | End: 2023-02-28

## 2023-02-23 RX ORDER — LABETALOL HCL 100 MG
600 TABLET ORAL THREE TIMES A DAY
Refills: 0 | Status: DISCONTINUED | OUTPATIENT
Start: 2023-02-23 | End: 2023-02-23

## 2023-02-23 RX ORDER — HYDRALAZINE HCL 50 MG
50 TABLET ORAL DAILY
Refills: 0 | Status: DISCONTINUED | OUTPATIENT
Start: 2023-02-23 | End: 2023-02-23

## 2023-02-23 RX ORDER — GLUCAGON INJECTION, SOLUTION 0.5 MG/.1ML
1 INJECTION, SOLUTION SUBCUTANEOUS ONCE
Refills: 0 | Status: DISCONTINUED | OUTPATIENT
Start: 2023-02-23 | End: 2023-02-28

## 2023-02-23 RX ORDER — FAMOTIDINE 10 MG/ML
20 INJECTION INTRAVENOUS ONCE
Refills: 0 | Status: COMPLETED | OUTPATIENT
Start: 2023-02-23 | End: 2023-02-23

## 2023-02-23 RX ORDER — AMLODIPINE BESYLATE 2.5 MG/1
10 TABLET ORAL ONCE
Refills: 0 | Status: COMPLETED | OUTPATIENT
Start: 2023-02-23 | End: 2023-02-23

## 2023-02-23 RX ORDER — SODIUM CHLORIDE 9 MG/ML
1000 INJECTION, SOLUTION INTRAVENOUS
Refills: 0 | Status: DISCONTINUED | OUTPATIENT
Start: 2023-02-23 | End: 2023-02-28

## 2023-02-23 RX ORDER — DEXTROSE 50 % IN WATER 50 %
15 SYRINGE (ML) INTRAVENOUS ONCE
Refills: 0 | Status: DISCONTINUED | OUTPATIENT
Start: 2023-02-23 | End: 2023-02-28

## 2023-02-23 RX ORDER — LABETALOL HCL 100 MG
300 TABLET ORAL THREE TIMES A DAY
Refills: 0 | Status: DISCONTINUED | OUTPATIENT
Start: 2023-02-23 | End: 2023-02-23

## 2023-02-23 RX ORDER — LABETALOL HCL 100 MG
600 TABLET ORAL THREE TIMES A DAY
Refills: 0 | Status: DISCONTINUED | OUTPATIENT
Start: 2023-02-23 | End: 2023-02-28

## 2023-02-23 RX ADMIN — Medication 600 MILLIGRAM(S): at 21:07

## 2023-02-23 RX ADMIN — Medication 600 MILLIGRAM(S): at 16:20

## 2023-02-23 RX ADMIN — FAMOTIDINE 20 MILLIGRAM(S): 10 INJECTION INTRAVENOUS at 10:47

## 2023-02-23 RX ADMIN — Medication 50 MILLIGRAM(S): at 23:36

## 2023-02-23 RX ADMIN — Medication 650 MILLIGRAM(S): at 21:07

## 2023-02-23 RX ADMIN — Medication 650 MILLIGRAM(S): at 22:20

## 2023-02-23 RX ADMIN — Medication 50 MILLIGRAM(S): at 18:38

## 2023-02-23 RX ADMIN — ATORVASTATIN CALCIUM 20 MILLIGRAM(S): 80 TABLET, FILM COATED ORAL at 21:07

## 2023-02-23 RX ADMIN — AMLODIPINE BESYLATE 10 MILLIGRAM(S): 2.5 TABLET ORAL at 10:31

## 2023-02-23 NOTE — ED ADULT TRIAGE NOTE - CHIEF COMPLAINT QUOTE
p/t living with DM type2, c/o of epigastric pain with mild sob since last night, p/t non compliant with meds

## 2023-02-23 NOTE — ED CDU PROVIDER INITIAL DAY NOTE - CLINICAL SUMMARY MEDICAL DECISION MAKING FREE TEXT BOX
Patient is a 52y F PMHx DM, HTN, CVA (no deficits, on ASA), CHF, p/w CP. ACS v GERD. More consistent with GERD, but can not r/o ACS at this time. Patient is a high risk chest pain.Patient was found to have a troponin of 55 delta at 251 currently not having any chest pain.  We will keep in the CDU for chest pain along with echo and stress test and cardiology follow-up we will stay away from neck nephrotoxic medications.  Patient's kidney function is at baseline.  Patient has a nephrologist that she follows up with.

## 2023-02-23 NOTE — ED PROVIDER NOTE - PHYSICAL EXAMINATION
GENERAL: no acute distress, non-toxic appearing  HEAD: normocephalic, atraumatic  HEENT: PERRLA, EOMI, normal conjunctiva  CARDIAC: regular rate and rhythm, normal S1 and S2, no appreciable murmurs  PULM: clear to ascultation bilaterally, no crackles, rales, rhonchi, or wheezing  GI: abdomen nondistended, soft, mild LLQ tenderness, no guarding or rebound tenderness  : no suprapubic tenderness  NEURO: alert and oriented x 3, normal speech, no focal motor or sensory deficits, gait normal, no gross neurologic deficit  MSK: no visible deformities, no peripheral edema, calf tenderness/redness/swelling  SKIN: no visible rashes, dry, well-perfused  PSYCH: appropriate mood and affect

## 2023-02-23 NOTE — ED PROVIDER NOTE - OBJECTIVE STATEMENT
Patient is a 52y F PMHx DM, HTN, CVA (no deficits, on ASA), CHF, p/w CP. Patient states felt well yesterday then went to bed and had midsternal CP, unable to get comfortable. Pain non-radiating, improves with sitting/standing. BP high in triage, patient states she missed her amlodipine this AM. Denies fevers, nvd, back pain, abdominal pain, urinary symptoms.

## 2023-02-23 NOTE — ED CDU PROVIDER INITIAL DAY NOTE - NS ED ATTENDING STATEMENT MOD
This was a shared visit with the GASTON. I reviewed and verified the documentation and independently performed the documented:

## 2023-02-23 NOTE — ED PROVIDER NOTE - CLINICAL SUMMARY MEDICAL DECISION MAKING FREE TEXT BOX
Patient is a 52y F PMHx DM, HTN, CVA (no deficits, on ASA), CHF, p/w CP. ACS v GERD. More consistent with GERD, but can not r/o ACS at this time. Patient is a high risk chest pain. Will likely recommend admit v CDU for cardiac wrok-up. Last Echo showed EF 45% with LVH.

## 2023-02-23 NOTE — ED CDU PROVIDER INITIAL DAY NOTE - OBJECTIVE STATEMENT
Patient is a 52y F PMHx DM, HTN, CVA (no deficits, on ASA), CHF, p/w CP. Patient states felt well yesterday then went to bed and had midsternal CP, unable to get comfortable. Pain non-radiating, improves with sitting/standing. BP high in triage, patient states she missed her amlodipine this AM. Denies fevers, nvd, back pain, abdominal pain, urinary symptoms.    This is a 52-year-old female with history of diabetes hypertension CVA in the past no deficits on aspirin, CHF presented to the emergency room complaining of chest pain for the past few days patient states that the pain started yesterday into today substernal getting worse improves with sitting and standing unrelated to ambulating or rest unrelated to eating or drinking.  She denies any nausea vomiting or diarrhea constipation.  She had a echo and stress done several years ago she denies having any exertional dyspnea nausea vomiting diarrhea fever or chills.  She currently does not take any medications for her diabetes.

## 2023-02-23 NOTE — ED CDU PROVIDER INITIAL DAY NOTE - ATTENDING APP SHARED VISIT CONTRIBUTION OF CARE
53 yo F hx HTN, CHF (EF 45% 2019), CKD, presenting with complaints of constant, midsternal, non-radiating chest pressure that started while in bed last night. No associated nausea/vomiting or diaphoresis. Chest pressure improves with sitting up. Denies recent fevers/chills, cough, sob/villa/orthopnea or weight gain. On exam, well appearing, NAD, heart rrr, lungs ctab, abd soft nt/nd, no LE swelling. EKG nsr, no stemi, STD in lateral leads similar to prior. Plan for labs including trop, lipase. Abdominal exam benign, no epigastric/RUQ ttp- will obtain serial abd exams and rpt labs, plan for CDU for stress/echo.

## 2023-02-23 NOTE — ED ADULT NURSE NOTE - OBJECTIVE STATEMENT
Received patient in room 16 c/o mid sternal chest pain x 1 day, patient denies fever, chills, headache, PMHX HTN, CVA, CHF, DM. Patient is A&OX4, ambulatory, airway patent, breathing unlabored and even, radial pulses palpable. Labs obtained, medications given as ordered. Side rails up and safety maintained. Fall precaution in place. Call bells within reach. Family at bedside.

## 2023-02-23 NOTE — ED PROVIDER NOTE - NSICDXPASTMEDICALHX_GEN_ALL_CORE_FT
PAST MEDICAL HISTORY:  CHF (congestive heart failure) episode prior to placed on heart medications    CHF (congestive heart failure) 2011    CRI (Chronic Renal Insufficiency)     CVA (cerebral vascular accident) ischemic 2019    Diabetes mellitus type 2    DM (Diabetes Mellitus)     Excessive and frequent menstruation     Fibroid     Fibroids uterine fibroids S/P Surgical Resolution    HTN (Hypertension)     Hyperlipidemia     Hypertension     Hypertension     Leiomyoma     Stage 3 chronic kidney disease

## 2023-02-24 DIAGNOSIS — R07.9 CHEST PAIN, UNSPECIFIED: ICD-10-CM

## 2023-02-24 DIAGNOSIS — Z90.710 ACQUIRED ABSENCE OF BOTH CERVIX AND UTERUS: Chronic | ICD-10-CM

## 2023-02-24 LAB — TROPONIN T, HIGH SENSITIVITY RESULT: 50 NG/L — SIGNIFICANT CHANGE UP

## 2023-02-24 PROCEDURE — 78452 HT MUSCLE IMAGE SPECT MULT: CPT | Mod: 26

## 2023-02-24 PROCEDURE — 99223 1ST HOSP IP/OBS HIGH 75: CPT

## 2023-02-24 PROCEDURE — 99233 SBSQ HOSP IP/OBS HIGH 50: CPT

## 2023-02-24 PROCEDURE — 93306 TTE W/DOPPLER COMPLETE: CPT | Mod: 26

## 2023-02-24 PROCEDURE — 93016 CV STRESS TEST SUPVJ ONLY: CPT | Mod: GC

## 2023-02-24 PROCEDURE — 93018 CV STRESS TEST I&R ONLY: CPT | Mod: GC

## 2023-02-24 RX ORDER — LABETALOL HCL 100 MG
20 TABLET ORAL ONCE
Refills: 0 | Status: COMPLETED | OUTPATIENT
Start: 2023-02-24 | End: 2023-02-24

## 2023-02-24 RX ORDER — HYDRALAZINE HCL 50 MG
10 TABLET ORAL ONCE
Refills: 0 | Status: DISCONTINUED | OUTPATIENT
Start: 2023-02-24 | End: 2023-02-24

## 2023-02-24 RX ADMIN — ATORVASTATIN CALCIUM 20 MILLIGRAM(S): 80 TABLET, FILM COATED ORAL at 21:42

## 2023-02-24 RX ADMIN — Medication 81 MILLIGRAM(S): at 13:22

## 2023-02-24 RX ADMIN — Medication 20 MILLIGRAM(S): at 10:26

## 2023-02-24 RX ADMIN — Medication 50 MILLIGRAM(S): at 06:11

## 2023-02-24 RX ADMIN — AMLODIPINE BESYLATE 10 MILLIGRAM(S): 2.5 TABLET ORAL at 06:12

## 2023-02-24 RX ADMIN — Medication 600 MILLIGRAM(S): at 19:06

## 2023-02-24 RX ADMIN — Medication 50 MILLIGRAM(S): at 14:14

## 2023-02-24 RX ADMIN — Medication 50 MILLIGRAM(S): at 21:42

## 2023-02-24 NOTE — ED CDU PROVIDER SUBSEQUENT DAY NOTE - CLINICAL SUMMARY MEDICAL DECISION MAKING FREE TEXT BOX
52y F PMHx DM, HTN, CVA (no deficits, on ASA), CHF, p/w CP. ACS v GERD. More consistent with GERD, but can not r/o ACS at this time. Patient is a high risk chest pain. Patient was found to have a troponin of 54, 51, 50 currently not having any chest pain. Continue cardiac monitoring in CDU. Pending Stress and echo.

## 2023-02-24 NOTE — ED CDU PROVIDER DISPOSITION NOTE - CLINICAL COURSE
Patient is a 52-year-old female with past medical history of diabetes mellitus, hypertension, CVA, congestive heart failure presenting with chest pain x2 days.  Patient reports midsternal chest pain, nonradiating, nonpleuritic.  Patient denies any fevers, chills, nausea, vomiting, diarrhea, back pain, abdominal pain, palpitations, syncope, illicit drug use, alcohol abuse, calf pain/swelling, history of malignancy, recent surgery in the past 2 months, history of DVT/PE, exogenous hormone use, hemoptysis.  In the emergency department, troponin of 54 and 51.  Patient was placed in the observation unit for stress test and echocardiogram.  Echocardiogram with following conclusions: "1. Concentric left ventricular hypertrophy. 2. Mild global left ventricular systolic dysfunction. 3. Mild diastolic dysfunction (Stage I). 4. Normal right ventricular size and function."  Stress test with following impressions:  "Abnormal Study * Myocardial Perfusion SPECT results are abnormal. * Review of raw data shows: The study is of good technical quality. * The left ventricle was moderately dilated at stress. There is a non-homogenous (patchy) pattern of tracer uptake, more consistent with cardiomyopathy than with epicardial CAD * Post-stress gated wall motion analysis was performed (LVEF = 25 %;LVEDV = 187 ml.), revealing severe global hypokinesis.The RV function appeared normal."  Discussed case with cardiologist who is on-call for cardiologist Dr. Oc Ortiz.  On-call cardiologist states their cardiology group does not have admitting privileges at [facility].  Patient was admitted to Dr. Km Galvan.

## 2023-02-24 NOTE — ED CDU PROVIDER SUBSEQUENT DAY NOTE - ATTENDING APP SHARED VISIT CONTRIBUTION OF CARE
53 yo F hx HTN, DM2, CHF presenting with complaints of cp x 2 days. Echo with 25% EF, LVH, gobal LV systolic dysfunction, and stress test abnormal. Plan for admission.

## 2023-02-24 NOTE — ED CDU PROVIDER SUBSEQUENT DAY NOTE - NSICDXPASTSURGICALHX_GEN_ALL_CORE_FT
PAST SURGICAL HISTORY:   delivery delivered 2002     delivery delivered 2002    Fibroid excision of fibroid in     H/O myomectomy 2005    History of  Section     Miscarriage x 2    S/P myomectomy 2005    Termination of pregnancy (fetus) x 2     PAST SURGICAL HISTORY:   delivery delivered 2002     delivery delivered 2002    Fibroid excision of fibroid in     H/O myomectomy 2005    H/O: hysterectomy     History of  Section     Miscarriage x 2    S/P myomectomy 2005    Termination of pregnancy (fetus) x 2

## 2023-02-24 NOTE — ED CDU PROVIDER SUBSEQUENT DAY NOTE - DATE/TIME 1
How Severe Are Your Spot(S)?: moderate
What Is The Reason For Today's Visit?: Upper Body Skin Exam
24-Feb-2023 10:18

## 2023-02-24 NOTE — ED CDU PROVIDER SUBSEQUENT DAY NOTE - HISTORY
Problem: OCCUPATIONAL THERAPY ADULT  Goal: Performs self-care activities at highest level of function for planned discharge setting  See evaluation for individualized goals  Note: Limitation: Decreased ADL status, Decreased UE strength, Decreased Safe judgement during ADL, Decreased endurance, Decreased self-care trans  Prognosis: Good  Assessment: Pt is a 74 y o  female seen for OT evaluation s/p admit to [facility] on 8/11/2020 w/ Generalized weakness  Comorbidities affecting pt's functional performance at time of assessment include: DM, HTN and Anemia, Hemodialysis D, see H & P for additional PMHx  Personal factors affecting pt at time of IE include:difficulty performing ADLS  Prior to admission, pt was living at home with spouse/family support, completing most self care ADL's, D for IADL's, I'ly mobile in W/C and uses RW on occasion/to transfer  [facility] Upon evaluation: Pt requires an increased level of assistance with self care ADL's and functional transfers/toileting r/t  the following deficits impacting occupational performance: weakness, decreased strength, decreased balance, decreased tolerance and decreased safety awareness  Pt to benefit from continued skilled OT tx while in the hospital to address deficits as defined above and maximize level of functional independence w ADL's and functional mobility  Occupational Performance areas to address include: bathing/shower, dressing and functional mobility   From OT standpoint, recommendation at time of d/c would be STR      OT Discharge Recommendation: Post-Acute Rehabilitation Services  OT - OK to Discharge: Yes(when medically cleared) 52y F PMHx DM, HTN, CVA (no deficits, on ASA), CHF, p/w CP. ACS v GERD. More consistent with GERD, but can not r/o ACS at this time. Patient is a high risk chest pain. Patient was found to have a troponin of 54, 51, 50 currently not having any chest pain. Continue cardiac monitoring in CDU. Pending Stress and echo.

## 2023-02-24 NOTE — ED CDU PROVIDER SUBSEQUENT DAY NOTE - PROGRESS NOTE DETAILS
Received call from stress NURIA Dodson who states she could not perform stress test due to elevated systolic BP.  She states that pt may receive beta blocker.  Will plan to give IV Labetalol 20 mg and recheck blood pressure.

## 2023-02-25 DIAGNOSIS — I50.22 CHRONIC SYSTOLIC (CONGESTIVE) HEART FAILURE: ICD-10-CM

## 2023-02-25 DIAGNOSIS — E11.9 TYPE 2 DIABETES MELLITUS WITHOUT COMPLICATIONS: ICD-10-CM

## 2023-02-25 DIAGNOSIS — I10 ESSENTIAL (PRIMARY) HYPERTENSION: ICD-10-CM

## 2023-02-25 DIAGNOSIS — R74.01 ELEVATION OF LEVELS OF LIVER TRANSAMINASE LEVELS: ICD-10-CM

## 2023-02-25 DIAGNOSIS — R07.9 CHEST PAIN, UNSPECIFIED: ICD-10-CM

## 2023-02-25 DIAGNOSIS — Z29.9 ENCOUNTER FOR PROPHYLACTIC MEASURES, UNSPECIFIED: ICD-10-CM

## 2023-02-25 DIAGNOSIS — N18.4 CHRONIC KIDNEY DISEASE, STAGE 4 (SEVERE): ICD-10-CM

## 2023-02-25 LAB
CULTURE RESULTS: SIGNIFICANT CHANGE UP
SPECIMEN SOURCE: SIGNIFICANT CHANGE UP

## 2023-02-25 RX ORDER — SEMAGLUTIDE 0.68 MG/ML
0.5 INJECTION, SOLUTION SUBCUTANEOUS
Qty: 0 | Refills: 0 | DISCHARGE

## 2023-02-25 RX ORDER — LANOLIN ALCOHOL/MO/W.PET/CERES
3 CREAM (GRAM) TOPICAL AT BEDTIME
Refills: 0 | Status: DISCONTINUED | OUTPATIENT
Start: 2023-02-25 | End: 2023-02-28

## 2023-02-25 RX ORDER — HEPARIN SODIUM 5000 [USP'U]/ML
5000 INJECTION INTRAVENOUS; SUBCUTANEOUS EVERY 8 HOURS
Refills: 0 | Status: DISCONTINUED | OUTPATIENT
Start: 2023-02-25 | End: 2023-02-28

## 2023-02-25 RX ORDER — DOXAZOSIN MESYLATE 4 MG
1 TABLET ORAL
Qty: 0 | Refills: 0 | DISCHARGE

## 2023-02-25 RX ORDER — OLMESARTAN MEDOXOMIL 5 MG/1
2 TABLET, FILM COATED ORAL
Qty: 0 | Refills: 0 | DISCHARGE

## 2023-02-25 RX ORDER — ASPIRIN/CALCIUM CARB/MAGNESIUM 324 MG
1 TABLET ORAL
Qty: 0 | Refills: 0 | DISCHARGE

## 2023-02-25 RX ORDER — LABETALOL HCL 100 MG
2 TABLET ORAL
Qty: 0 | Refills: 0 | DISCHARGE

## 2023-02-25 RX ORDER — HYDRALAZINE HCL 50 MG
1 TABLET ORAL
Qty: 0 | Refills: 0 | DISCHARGE

## 2023-02-25 RX ORDER — FUROSEMIDE 40 MG
20 TABLET ORAL DAILY
Refills: 0 | Status: DISCONTINUED | OUTPATIENT
Start: 2023-02-25 | End: 2023-02-27

## 2023-02-25 RX ORDER — DOXAZOSIN MESYLATE 4 MG
8 TABLET ORAL AT BEDTIME
Refills: 0 | Status: DISCONTINUED | OUTPATIENT
Start: 2023-02-25 | End: 2023-02-28

## 2023-02-25 RX ORDER — ONDANSETRON 8 MG/1
4 TABLET, FILM COATED ORAL EVERY 8 HOURS
Refills: 0 | Status: DISCONTINUED | OUTPATIENT
Start: 2023-02-25 | End: 2023-02-28

## 2023-02-25 RX ORDER — LABETALOL HCL 100 MG
600 TABLET ORAL ONCE
Refills: 0 | Status: COMPLETED | OUTPATIENT
Start: 2023-02-25 | End: 2023-02-25

## 2023-02-25 RX ORDER — AMLODIPINE BESYLATE 2.5 MG/1
1 TABLET ORAL
Qty: 0 | Refills: 0 | DISCHARGE

## 2023-02-25 RX ORDER — ATORVASTATIN CALCIUM 80 MG/1
80 TABLET, FILM COATED ORAL AT BEDTIME
Refills: 0 | Status: DISCONTINUED | OUTPATIENT
Start: 2023-02-25 | End: 2023-02-28

## 2023-02-25 RX ORDER — ACETAMINOPHEN 500 MG
650 TABLET ORAL EVERY 6 HOURS
Refills: 0 | Status: DISCONTINUED | OUTPATIENT
Start: 2023-02-25 | End: 2023-02-28

## 2023-02-25 RX ADMIN — HEPARIN SODIUM 5000 UNIT(S): 5000 INJECTION INTRAVENOUS; SUBCUTANEOUS at 05:24

## 2023-02-25 RX ADMIN — Medication 50 MILLIGRAM(S): at 05:23

## 2023-02-25 RX ADMIN — Medication 50 MILLIGRAM(S): at 21:58

## 2023-02-25 RX ADMIN — AMLODIPINE BESYLATE 10 MILLIGRAM(S): 2.5 TABLET ORAL at 05:24

## 2023-02-25 RX ADMIN — Medication 50 MILLIGRAM(S): at 13:39

## 2023-02-25 RX ADMIN — Medication 600 MILLIGRAM(S): at 13:38

## 2023-02-25 RX ADMIN — Medication 8 MILLIGRAM(S): at 21:58

## 2023-02-25 RX ADMIN — ATORVASTATIN CALCIUM 80 MILLIGRAM(S): 80 TABLET, FILM COATED ORAL at 21:58

## 2023-02-25 RX ADMIN — Medication 600 MILLIGRAM(S): at 21:57

## 2023-02-25 RX ADMIN — Medication 600 MILLIGRAM(S): at 03:07

## 2023-02-25 RX ADMIN — Medication 81 MILLIGRAM(S): at 12:06

## 2023-02-25 NOTE — H&P ADULT - NSICDXPASTSURGICALHX_GEN_ALL_CORE_FT
PAST SURGICAL HISTORY:   delivery delivered 2002     delivery delivered 2002    Fibroid excision of fibroid in     H/O myomectomy 2005    H/O: hysterectomy     History of  Section     Miscarriage x 2    S/P myomectomy 2005    Termination of pregnancy (fetus) x 2

## 2023-02-25 NOTE — CONSULT NOTE ADULT - SUBJECTIVE AND OBJECTIVE BOX
CARDIOLOGY CONSULT - Dr. Galvan  Date of Service: 23      HPI:  53 y/o female with history of uncontrolled HTN, DM, HTN, CVA (in the past no deficits on aspirin) CHF (EF 25%), CKD4 presented to the emergency room complaining of chest pain. Pt reports pressure like substernal chest pain that started the evening prior to admission while lying in bed. Pain was intermittent throughout the night. Pain exacerbated by lying flat, improves with sitting. Not related to exertion. Denies any associated dyspnea, diaphoresis, N/V. Chest pain resolved since then and has not recurred since admission. Denies cough, abd pain, urinary symptoms or LE edema.   Pt had an TTE that showed reduction in EF to 25%. Stress testing was abnormal with severe global hypokinesis. Pt was admitted for further evaluation.  (2023 03:11)      PAST MEDICAL & SURGICAL HISTORY:  HTN (Hypertension)      DM (Diabetes Mellitus)      CRI (Chronic Renal Insufficiency)      Fibroids  uterine fibroids S/P Surgical Resolution      Hypertension      Leiomyoma      CHF (congestive heart failure)  episode prior to placed on heart medications      Hypertension      Diabetes mellitus  type 2      Hyperlipidemia      CHF (congestive heart failure)        CVA (cerebral vascular accident)  ischemic 2019      Excessive and frequent menstruation      Fibroid      Stage 3 chronic kidney disease      History of  Section      H/O myomectomy         delivery delivered        Fibroid  excision of fibroid in       Termination of pregnancy (fetus)  x 2      Miscarriage  x 2      S/P myomectomy         delivery delivered        H/O: hysterectomy              PREVIOUS DIAGNOSTIC TESTING:    [ ] Echocardiogram:  [ ]  Catheterization:  [ ] Stress Test:  	    MEDICATIONS:  MEDICATIONS  (STANDING):  amLODIPine   Tablet 10 milliGRAM(s) Oral daily  aspirin  chewable 81 milliGRAM(s) Oral daily  atorvastatin 80 milliGRAM(s) Oral at bedtime  dextrose 5%. 1000 milliLiter(s) (50 mL/Hr) IV Continuous <Continuous>  dextrose 50% Injectable 25 Gram(s) IV Push once  doxazosin 8 milliGRAM(s) Oral at bedtime  glucagon  Injectable 1 milliGRAM(s) IntraMuscular once  heparin   Injectable 5000 Unit(s) SubCutaneous every 8 hours  hydrALAZINE 50 milliGRAM(s) Oral three times a day  insulin lispro (ADMELOG) corrective regimen sliding scale   SubCutaneous three times a day before meals  labetalol 600 milliGRAM(s) Oral three times a day      FAMILY HISTORY:  Family history of hypertension (Mother)    Family history of hypertension (Father)    Family history of diabetes mellitus (DM) (Father)    FH: HTN (hypertension)    FH: type 2 diabetes        SOCIAL HISTORY:    [ ] Non-smoker  [ ] Smoker  [ ] Alcohol    Allergies    No Known Allergies    Intolerances    	    REVIEW OF SYSTEMS:  CONSTITUTIONAL: No fever, weight loss, or fatigue  EYES: No eye pain, visual disturbances, or discharge  ENMT:  No difficulty hearing, tinnitus, vertigo; No sinus or throat pain  NECK: No pain or stiffness  RESPIRATORY: No cough, wheezing, chills or hemoptysis; No Shortness of Breath  CARDIOVASCULAR: No chest pain, palpitations, passing out, dizziness, or leg swelling  GASTROINTESTINAL: No abdominal or epigastric pain. No nausea, vomiting, or hematemesis; No diarrhea or constipation. No melena or hematochezia.  GENITOURINARY: No dysuria, frequency, hematuria, or incontinence  NEUROLOGICAL: No headaches, memory loss, loss of strength, numbness, or tremors  SKIN: No itching, burning, rashes, or lesions   	    [ ] All others negative	  [ ] Unable to obtain    PHYSICAL EXAM:  T(C): 36.3 (23 @ 15:00), Max: 37 (23 @ 21:50)  HR: 91 (23 @ 15:00) (80 - 94)  BP: 168/91 (23 @ 15:00) (147/92 - 193/107)  RR: 17 (23 @ 15:00) (16 - 20)  SpO2: 100% (23 @ 15:00) (100% - 100%)  Wt(kg): --  I&O's Summary      Appearance: Normal	  Psychiatry: A & O x 3, Mood & affect appropriate  HEENT:   Normal oral mucosa, PERRL, EOMI	  Lymphatic: No lymphadenopathy  Cardiovascular: Normal S1 S2,RRR, No JVD, No murmurs  Respiratory: Lungs clear to auscultation	  Gastrointestinal:  Soft, Non-tender, + BS	  Skin: No rashes, No ecchymoses, No cyanosis	  Neurologic: Non-focal  Extremities: Normal range of motion, No clubbing, cyanosis or edema  Vascular: Peripheral pulses palpable 2+ bilaterally    TELEMETRY: 	    ECG:  	  RADIOLOGY:  OTHER: 	  	  LABS:	 	    CARDIAC MARKERS:                      proBNP:   Lipid Profile:   HgA1c:   TSH:     ASSESSMENT/PLAN: 	              70 minutes spent on total encounter; more than 50% of the visit was spent counseling and/or coordinating care by the attending physician.

## 2023-02-25 NOTE — H&P ADULT - ASSESSMENT
51 y/o female with history of uncontrolled HTN, DM, HTN, CVA (in the past no deficits on aspirin) CHF (EF 25%), CKD4 p/w chest pain found to have abnormal stress test and severe global hypokinesis admitted for further evaluation

## 2023-02-25 NOTE — H&P ADULT - NSICDXFAMILYHX_GEN_ALL_CORE_FT
Patient seen in anticoagulation clinic. Reviewed past INR and dose with patient.  Patient denies any medication changes. States she missed a couple doses of warfarin over the past 2 weeks.  Diet and activity consistent.  Reviewed INR goal.  INR in lower-expanded range.  Will continue current dose of warfarin and recheck in 3 weeks; next INR coordinated with 12/13 lab appointment for another provider.  Reminded patient to call office with any changes.  INR and dose per Dr DEXTER Nieves's protocol.  Onsite provider Anuja Diego PA-C.   FAMILY HISTORY:  FH: HTN (hypertension)  FH: type 2 diabetes    Father  Still living? No  Family history of diabetes mellitus (DM), Age at diagnosis: Age Unknown  Family history of hypertension, Age at diagnosis: Age Unknown    Mother  Still living? Yes, Estimated age: Age Unknown  Family history of hypertension, Age at diagnosis: Age Unknown

## 2023-02-25 NOTE — CONSULT NOTE ADULT - SUBJECTIVE AND OBJECTIVE BOX
[facility] - S Tyson / Bigg S /D Fredy/ AUGUSTINA Rocha/ AUGUSTINA Barr/ Taj Su / GABRIEL Vaughnu/ O Mati  service -[phone], office [phone]  ---------------------------------------------------------------------------------------------------------------    Patient is a 52y Female whom presented to the hospital with   Denied recent NSAID use/Abx use/iv contrast studies.    Patient seen and examined    PAST MEDICAL & SURGICAL HISTORY:  HTN (Hypertension)      DM (Diabetes Mellitus)      CRI (Chronic Renal Insufficiency)      Fibroids  uterine fibroids S/P Surgical Resolution      Hypertension      Leiomyoma      CHF (congestive heart failure)  episode prior to placed on heart medications      Hypertension      Diabetes mellitus  type 2      Hyperlipidemia      CHF (congestive heart failure)        CVA (cerebral vascular accident)  ischemic       Excessive and frequent menstruation      Fibroid      Stage 3 chronic kidney disease      History of  Section      H/O myomectomy         delivery delivered        Fibroid  excision of fibroid in       Termination of pregnancy (fetus)  x 2      Miscarriage  x 2      S/P myomectomy         delivery delivered        H/O: hysterectomy          Allergies: No Known Allergies    Home Medications Reviewed  Hospital Medications:   MEDICATIONS  (STANDING):  amLODIPine   Tablet 10 milliGRAM(s) Oral daily  aspirin  chewable 81 milliGRAM(s) Oral daily  atorvastatin 80 milliGRAM(s) Oral at bedtime  dextrose 5%. 1000 milliLiter(s) (50 mL/Hr) IV Continuous <Continuous>  dextrose 50% Injectable 25 Gram(s) IV Push once  doxazosin 8 milliGRAM(s) Oral at bedtime  glucagon  Injectable 1 milliGRAM(s) IntraMuscular once  heparin   Injectable 5000 Unit(s) SubCutaneous every 8 hours  hydrALAZINE 50 milliGRAM(s) Oral three times a day  insulin lispro (ADMELOG) corrective regimen sliding scale   SubCutaneous three times a day before meals  labetalol 600 milliGRAM(s) Oral three times a day    SOCIAL HISTORY:  Denies ETOh,Smoking, illicit drug use  FAMILY HISTORY:  Family history of hypertension (Mother)    Family history of hypertension (Father)    Family history of diabetes mellitus (DM) (Father)    FH: HTN (hypertension)    FH: type 2 diabetes        REVIEW OF SYSTEMS:  CONSTITUTIONAL: No weakness, fevers or chills  EYES/ENT: No visual changes;  No vertigo or throat pain   NECK: No pain or stiffness  RESPIRATORY: No cough, wheezing, hemoptysis; No shortness of breath  CARDIOVASCULAR: No chest pain or palpitations.  GASTROINTESTINAL: No abdominal or epigastric pain. No nausea, vomiting, or hematemesis; No diarrhea or constipation. No melena or hematochezia.  GENITOURINARY: No dysuria, frequency, foamy urine, urinary urgency, incontinence or hematuria  NEUROLOGICAL: No numbness or weakness  SKIN: No itching, burning, rashes, or lesions   VASCULAR: No bilateral lower extremity edema.   All other review of systems is negative unless indicated above.    VITALS:  T(F): 97.3 (23 @ 15:00), Max: 98.6 (23 @ 21:50)  HR: 91 (23 @ 15:00)  BP: 168/91 (23 @ 15:00)  RR: 17 (23 @ 15:00)  SpO2: 100% (23 @ 15:00)  Wt(kg): --    Height (cm): 162.6 ( @ 15:00)  Weight (kg): 92.9 ( @ 15:00)  BMI (kg/m2): 35.1 ( @ 15:00)  BSA (m2): 1.98 ( @ 15:00)    PHYSICAL EXAM:  Constitutional: NAD  HEENT: anicteric sclera, oropharynx clear, MMM  Neck: No JVD  Respiratory: CTAB, no wheezes, rales or rhonchi  Cardiovascular: S1, S2, RRR  Gastrointestinal: BS+, soft, NT/ND  Extremities: No cyanosis or clubbing. No peripheral edema  Neurological: A/O x 3, no focal deficits  Psychiatric: Normal mood, normal affect  : No CVA tenderness. No diaz.   Skin: No rashes  Vascular Access:    LABS:        Creatinine Trend: 2.12 <--, 2.07 <--    Urine Studies:  Urinalysis Basic - ( 2023 16:40 )    Color: Light Yellow / Appearance: Clear / S.016 / pH:   Gluc:  / Ketone: Negative  / Bili: Negative / Urobili: <2 mg/dL   Blood:  / Protein: 100 mg/dL / Nitrite: Negative   Leuk Esterase: Large / RBC: 1 /HPF / WBC 18 /HPF   Sq Epi:  / Non Sq Epi: 10 /HPF / Bacteria: Few          RADIOLOGY & ADDITIONAL STUDIES:                 [facility] - S Tyson / Bigg S /D Fredy/ S Josefina/ S Cortney/ Taj Su / GABRIEL Vaughnu/ O Mati  service -[phone], office [phone]  ---------------------------------------------------------------------------------------------------------------  Patient seen and examined bedside    51 y/o female with history of uncontrolled HTN, DM, HTN, CVA (in the past no deficits on aspirin) CHF (EF 25%), CKD4 presented to the emergency room complaining of chest pain. Pt reports pressure like substernal chest pain that started the evening prior to admission while lying in bed. Pain was intermittent throughout the night. Pain exacerbated by lying flat, improves with sitting. Not related to exertion. Denies any associated dyspnea, diaphoresis, N/V. Chest pain resolved since then and has not recurred since admission. Denies cough, abd pain, urinary symptoms or LE edema.   Pt had an TTE that showed reduction in EF to 25%. Stress testing was abnormal with severe global hypokinesis. Pt was admitted for further evaluation.   Renal consulted for CKD Mx. Pt has known h/o CKD, primary Nephrologist Dr Alaniz. Scr 1.9 for a long time stable as per pt. denied sob/N/V/D/urianry sxs/rash. cp resolved. Denied recent NSAID use/Abx use/iv contrast studies.    PAST MEDICAL & SURGICAL HISTORY:  HTN (Hypertension)      DM (Diabetes Mellitus)      CRI (Chronic Renal Insufficiency)      Fibroids  uterine fibroids S/P Surgical Resolution      Hypertension      Leiomyoma      CHF (congestive heart failure)  episode prior to placed on heart medications      Hypertension      Diabetes mellitus  type 2      Hyperlipidemia      CHF (congestive heart failure)        CVA (cerebral vascular accident)  ischemic       Excessive and frequent menstruation      Fibroid      Stage 3 chronic kidney disease      History of  Section      H/O myomectomy         delivery delivered  2002      Fibroid  excision of fibroid in       Termination of pregnancy (fetus)  x 2      Miscarriage  x 2      S/P myomectomy  2005       delivery delivered        H/O: hysterectomy          Allergies: No Known Allergies    Home Medications Reviewed  Hospital Medications:   MEDICATIONS  (STANDING):  amLODIPine   Tablet 10 milliGRAM(s) Oral daily  aspirin  chewable 81 milliGRAM(s) Oral daily  atorvastatin 80 milliGRAM(s) Oral at bedtime  dextrose 5%. 1000 milliLiter(s) (50 mL/Hr) IV Continuous <Continuous>  dextrose 50% Injectable 25 Gram(s) IV Push once  doxazosin 8 milliGRAM(s) Oral at bedtime  glucagon  Injectable 1 milliGRAM(s) IntraMuscular once  heparin   Injectable 5000 Unit(s) SubCutaneous every 8 hours  hydrALAZINE 50 milliGRAM(s) Oral three times a day  insulin lispro (ADMELOG) corrective regimen sliding scale   SubCutaneous three times a day before meals  labetalol 600 milliGRAM(s) Oral three times a day    SOCIAL HISTORY:  Denies ETOh,Smoking, illicit drug use  FAMILY HISTORY:  Family history of hypertension (Mother)    Family history of hypertension (Father)    Family history of diabetes mellitus (DM) (Father)    FH: HTN (hypertension)    FH: type 2 diabetes      REVIEW OF SYSTEMS:  CONSTITUTIONAL: No weakness, fevers or chills  EYES/ENT: No visual changes;  No vertigo or throat pain   NECK: No pain or stiffness  RESPIRATORY: No cough, wheezing, hemoptysis; No shortness of breath  CARDIOVASCULAR: No chest pain now or palpitations.  GASTROINTESTINAL: No abdominal or epigastric pain. No nausea, vomiting, or hematemesis; No diarrhea or constipation. No melena or hematochezia.  GENITOURINARY: No dysuria, frequency, foamy urine, urinary urgency, incontinence or hematuria  NEUROLOGICAL: No numbness or weakness  SKIN: No itching, burning, rashes, or lesions   VASCULAR: No bilateral lower extremity edema.   All other review of systems is negative unless indicated above.    VITALS:  T(F): 97.3 (23 @ 15:00), Max: 98.6 (23 @ 21:50)  HR: 91 (23 @ 15:00)  BP: 168/91 (23 @ 15:00)  RR: 17 (23 @ 15:00)  SpO2: 100% (23 @ 15:00)  Wt(kg): --    Height (cm): 162.6 ( @ 15:00)  Weight (kg): 92.9 ( @ 15:00)  BMI (kg/m2): 35.1 ( @ 15:00)  BSA (m2): 1.98 ( @ 15:00)    PHYSICAL EXAM:  Constitutional: NAD  HEENT: anicteric sclera  Neck: No JVD  Respiratory: CTAB, no wheezes, rales or rhonchi  Cardiovascular: S1, S2, RRR  Gastrointestinal: BS+, soft, NT/ND  Extremities: no pedal edema b/l   Neurological: A/O x 3  Psychiatric: Normal mood, normal affect  : No diaz.     LABS:        Creatinine Trend: 2.12 <--, 2.07 <--    Urine Studies:  Urinalysis Basic - ( 2023 16:40 )    Color: Light Yellow / Appearance: Clear / S.016 / pH:   Gluc:  / Ketone: Negative  / Bili: Negative / Urobili: <2 mg/dL   Blood:  / Protein: 100 mg/dL / Nitrite: Negative   Leuk Esterase: Large / RBC: 1 /HPF / WBC 18 /HPF   Sq Epi:  / Non Sq Epi: 10 /HPF / Bacteria: Few    RADIOLOGY & ADDITIONAL STUDIES:    < from: Xray Chest 2 Views PA/Lat (23 @ 10:41) >    IMPRESSION: Clear lungs.      --- End of Report ---    < end of copied text >    < from: US Duplex Kidneys (20 @ 09:06) >    IMPRESSION:     No evidence of a significant renal artery stenosis.  < from: US Duplex Kidneys (20 @ 09:06) >    Right kidney:  8.9 cm. No renal mass, hydronephrosis or calculi. Increased parenchymal echogenicity.    Left kidney:  8.1 cm. No renal mass, hydronephrosis or calculi. Increased parenchymal echogenicity.    Urinary bladder: Within normal limits. Known fibroid uterus.    < end of copied text >    < end of copied text >

## 2023-02-25 NOTE — H&P ADULT - PROBLEM SELECTOR PLAN 1
-Pt with episode of chest pain, now resolved. EKG unchanged from baseline and trops similar to prior. Mildly elevated in the setting of CKD but flat   -TTE and stress test demonstrating new cardiomyopathy and worsening of LV function. Stess test: The left ventricle was moderately dilated at stress. There is a non-homogenous (patchy) pattern of tracer uptake more consistent with cardiomyopathy than with epicardial CAD  -given no evidence of ACS, will hold on hep gtt/plavix load eliazar given uncontrolled BP  -F/u cards recs regarding ischemic evaluation and further w/u of cardiomyopathy   -monitor on tele

## 2023-02-25 NOTE — H&P ADULT - NSHPREVIEWOFSYSTEMS_GEN_ALL_CORE
CONSTITUTIONAL:  No weight loss, fever, chills, weakness or fatigue.  HEENT:  Eyes:  No visual loss, blurred vision, double vision or yellow sclerae. Ears, Nose, Throat:  No hearing loss, sneezing, congestion, runny nose or sore throat.  SKIN:  No rash or itching.  CARDIOVASCULAR:  +chest pain No palpitations or edema.  RESPIRATORY:  No shortness of breath, cough or sputum.  GASTROINTESTINAL:  No anorexia, nausea, vomiting or diarrhea. No abdominal pain or blood.  GENITOURINARY:  Denies hematuria, dysuria.   NEUROLOGICAL:  No headache, dizziness, syncope, paralysis, ataxia, numbness or tingling in the extremities. No change in bowel or bladder control.  MUSCULOSKELETAL:  No muscle, back pain, joint pain or stiffness.  PSYCHIATRIC:  No history of depression or anxiety.  ENDOCRINOLOGIC:  No reports of sweating, cold or heat intolerance. No polyuria or polydipsia.  ALLERGIES:  No history of asthma, hives, eczema or rhinitis.

## 2023-02-25 NOTE — H&P ADULT - PROBLEM SELECTOR PLAN 2
-LV function 25%  -no evidence of volume overload or ADHF at this time   -Strict Is and Os, daily weights  -f/u workup of cardiomyopathy as above

## 2023-02-25 NOTE — H&P ADULT - HISTORY OF PRESENT ILLNESS
----- Message from DUSTIN Bradford sent at 2/2/2018  9:01 AM EST -----  I have sent in the hyoscyamine tablets instead of the SL to see if that will go through    ----- Message -----     From: Ani Weathers MA     Sent: 1/31/2018  10:21 AM       To: DUSTIN Bradford    Insurance will not approve the Hyoscyamine. There preferred drugs are Didyclomine tabs (Tier 1)  or Glycopyrrol tabs (Tier 3)     51 y/o female with history of uncontrolled HTN, DM, HTN, CVA (in the past no deficits on aspirin) CHF (EF 25%), CKD4 presented to the emergency room complaining of chest pain. Pt reports pressure like substernal chest pain that started the evening prior to admission while lying in bed. Pain was intermittent throughout the night. Pain exacerbated by lying flat, improves with sitting. Not related to exertion. Denies any associated dyspnea, diaphoresis, N/V. Chest pain resolved since then and has not recurred since admission. Denies cough, abd pain, urinary symptoms or LE edema.   Pt had an TTE that showed reduction in EF to 25%. Stress testing was abnormal with severe global hypokinesis. Pt was admitted for further evaluation.

## 2023-02-25 NOTE — ED ADULT NURSE REASSESSMENT NOTE - NS ED NURSE REASSESS COMMENT FT1
Patient resting comfortably. No distress noted. Vitals stable. Nursing care continues
Patient resting comfortably. Patient offers no complaints and denies any pain. Nursing care continues
Report given to GABBY Maynard for continuity of care. Pt A&Ox4, respirations equal and unlabored. Pt denies any chest pain, SOB, palpitations, any other cardiac concerns. IV patent. David QUIÑONES aware of pt blood pressure. Medicated as EMR orders. Pt to be transported to CDU by PCA.
Report received from RN Rangel: Pt A&Ox4, respirations equal and unlabored. Pt resting in stretcher at this time comfortably with no complaints IV patent. Pt noted to be hypertensive. Denies CP, headache, any other complaints. CDU PA made aware. Awaiting further interventions. No acute distress noted at this time.
Pt resting comfortably in bed, denies complaints at this time, pt pending transport to bed assignment.
Break RN: pt a&ox4, resting comfortably in stretcher. pt denies cp, sob, n/v, headache/dizziness at this time. Breathing even, unlabored. Pt pending stress test results.

## 2023-02-25 NOTE — H&P ADULT - PROBLEM SELECTOR PLAN 3
-uncontrolled, likely due to missing several home medications  -c/w hydralazine, labetalol, amlodipine and Cardura   -consider workup for resistant hypertension as outpatient

## 2023-02-25 NOTE — PATIENT PROFILE ADULT - FALL HARM RISK - UNIVERSAL INTERVENTIONS
Bed in lowest position, wheels locked, appropriate side rails in place/Call bell, personal items and telephone in reach/Instruct patient to call for assistance before getting out of bed or chair/Non-slip footwear when patient is out of bed/[place] to call system/Physically safe environment - no spills, clutter or unnecessary equipment/Purposeful Proactive Rounding/Room/bathroom lighting operational, light cord in reach

## 2023-02-25 NOTE — H&P ADULT - NSHPLABSRESULTS_GEN_ALL_CORE
( @ 10:26)                      13.5  6.00 )-----------( 187                 43.6    Neutrophils = 4.00 (66.7%)  Lymphocytes = 1.21 (20.2%)  Eosinophils = 0.18 (3.0%)  Basophils = 0.02 (0.3%)  Monocytes = 0.56 (9.3%)  Bands = --%        138  |  105  |  30<H>  ----------------------------<  121<H>  4.2   |  27  |  2.12<H>    Ca    10.6<H>      2023 12:22    TPro  6.9  /  Alb  3.9  /  TBili  0.3  /  DBili  <0.2  /  AST  33<H>  /  ALT  34<H>  /  AlkPhos  135<H>        Urinalysis Basic - ( 2023 16:40 )    Color: Light Yellow / Appearance: Clear / S.016 / pH: x  Gluc: x / Ketone: Negative  / Bili: Negative / Urobili: <2 mg/dL   Blood: x / Protein: 100 mg/dL / Nitrite: Negative   Leuk Esterase: Large / RBC: 1 /HPF / WBC 18 /HPF   Sq Epi: x / Non Sq Epi: 10 /HPF / Bacteria: Few    < from: Transthoracic Echocardiogram (23 @ 06:32) >    CONCLUSIONS:  1. Concentric left ventricular hypertrophy.  2. Mildglobal left ventricular systolic dysfunction.  3. Mild diastolic dysfunction (Stage I).  4. Normal right ventricular size and function.    < end of copied text >    < from: Nuclear Stress Test-Pharmacologic (23 @ 12:26) >    IMPRESSIONS:Abnormal Study  * Myocardial Perfusion SPECT results are abnormal.  * Review of raw data shows: The study is of good technical  quality.  * The left ventricle was moderately dilated at stress.  There is a non-homogenous (patchy) pattern of tracer  uptake, more consistent with cardiomyopathy than with  epicardial CAD  * Post-stress gated wall motion analysis was performed  (LVEF = 25 %;LVEDV = 187 ml.), revealing severe global  hypokinesis.The RV function appeared normal.    < end of copied text >    < from: Xray Chest 2 Views PA/Lat (23 @ 10:41) >        IMPRESSION: Clear lungs.    < end of copied text >        Labs personally reviewed  Imaging personally reviewed  EKG: NSR, t wave inversions in leads II, avl, no changes compared to prior EKG

## 2023-02-25 NOTE — PROVIDER CONTACT NOTE (OTHER) - ASSESSMENT
Patient asymptomatic. Patient denies SOB, chest pain, and discomfort. No acute changes noted. Patient is currently resting comfortably in bed. Patient safety and comfort maintained.

## 2023-02-25 NOTE — H&P ADULT - NSHPPHYSICALEXAM_GEN_ALL_CORE
Get   Labs    ?  Related   To   Sad   Mood    Consider   Referral   To   Behavioral    Health     GENERAL APPEARANCE: Well developed, well nourished, alert and cooperative. NAD.   HEENT:  PERRL, EOMI. External auditory canals normal, hearing grossly intact.  NECK: Neck supple, non-tender   CARDIAC: Normal S1 and S2. No S3, S4 or murmurs. Rhythm is regular.  LUNGS: Clear to auscultation and percussion without rales, rhonchi, wheezing or diminished breath sounds.  ABDOMEN: Positive bowel sounds. Soft, nondistended, nontender. No guarding or rebound.   MUSCULOSKELETAL: ROM intact spine and extremities. No joint erythema or tenderness.   BACK: normal posture, no spinal deformity, symmetry of spinal muscles, without tenderness.  EXTREMITIES: No significant deformity or joint abnormality. No edema. Peripheral pulses intact. No varicosities.  NEUROLOGICAL: CN II-XII intact. Strength and sensation symmetric and intact throughout.   SKIN: Skin normal color, texture and turgor with no lesions or eruptions.  PSYCHIATRIC: AOx3.Normal affect and behavior.

## 2023-02-25 NOTE — PATIENT PROFILE ADULT - NSPROGENSOURCEINFO_GEN_A_NUR
Dr Martinez to bedside.  Discharge orders received.  discharge instructions reviewed per dr Martinez and printed discharge instructions given to pt.  Pt to be  discharged home   patient

## 2023-02-26 LAB
ANION GAP SERPL CALC-SCNC: 8 MMOL/L — SIGNIFICANT CHANGE UP (ref 7–14)
BUN SERPL-MCNC: 36 MG/DL — HIGH (ref 7–23)
CALCIUM SERPL-MCNC: 10.9 MG/DL — HIGH (ref 8.4–10.5)
CHLORIDE SERPL-SCNC: 105 MMOL/L — SIGNIFICANT CHANGE UP (ref 98–107)
CO2 SERPL-SCNC: 26 MMOL/L — SIGNIFICANT CHANGE UP (ref 22–31)
CREAT SERPL-MCNC: 2.41 MG/DL — HIGH (ref 0.5–1.3)
EGFR: 23 ML/MIN/1.73M2 — LOW
GLUCOSE SERPL-MCNC: 143 MG/DL — HIGH (ref 70–99)
HCT VFR BLD CALC: 43.7 % — SIGNIFICANT CHANGE UP (ref 34.5–45)
HGB BLD-MCNC: 13.2 G/DL — SIGNIFICANT CHANGE UP (ref 11.5–15.5)
MAGNESIUM SERPL-MCNC: 2.1 MG/DL — SIGNIFICANT CHANGE UP (ref 1.6–2.6)
MCHC RBC-ENTMCNC: 26.7 PG — LOW (ref 27–34)
MCHC RBC-ENTMCNC: 30.2 GM/DL — LOW (ref 32–36)
MCV RBC AUTO: 88.5 FL — SIGNIFICANT CHANGE UP (ref 80–100)
NRBC # BLD: 0 /100 WBCS — SIGNIFICANT CHANGE UP (ref 0–0)
NRBC # FLD: 0 K/UL — SIGNIFICANT CHANGE UP (ref 0–0)
PHOSPHATE SERPL-MCNC: 3.8 MG/DL — SIGNIFICANT CHANGE UP (ref 2.5–4.5)
PLATELET # BLD AUTO: 205 K/UL — SIGNIFICANT CHANGE UP (ref 150–400)
POTASSIUM SERPL-MCNC: 3.8 MMOL/L — SIGNIFICANT CHANGE UP (ref 3.5–5.3)
POTASSIUM SERPL-SCNC: 3.8 MMOL/L — SIGNIFICANT CHANGE UP (ref 3.5–5.3)
RBC # BLD: 4.94 M/UL — SIGNIFICANT CHANGE UP (ref 3.8–5.2)
RBC # FLD: 14.5 % — SIGNIFICANT CHANGE UP (ref 10.3–14.5)
SODIUM SERPL-SCNC: 139 MMOL/L — SIGNIFICANT CHANGE UP (ref 135–145)
WBC # BLD: 5.52 K/UL — SIGNIFICANT CHANGE UP (ref 3.8–10.5)
WBC # FLD AUTO: 5.52 K/UL — SIGNIFICANT CHANGE UP (ref 3.8–10.5)

## 2023-02-26 RX ORDER — ACETYLCYSTEINE 200 MG/ML
1200 VIAL (ML) MISCELLANEOUS EVERY 12 HOURS
Refills: 0 | Status: DISCONTINUED | OUTPATIENT
Start: 2023-02-26 | End: 2023-02-28

## 2023-02-26 RX ADMIN — Medication 600 MILLIGRAM(S): at 06:52

## 2023-02-26 RX ADMIN — ATORVASTATIN CALCIUM 80 MILLIGRAM(S): 80 TABLET, FILM COATED ORAL at 22:52

## 2023-02-26 RX ADMIN — AMLODIPINE BESYLATE 10 MILLIGRAM(S): 2.5 TABLET ORAL at 06:52

## 2023-02-26 RX ADMIN — Medication 8 MILLIGRAM(S): at 22:51

## 2023-02-26 RX ADMIN — Medication 50 MILLIGRAM(S): at 06:52

## 2023-02-26 RX ADMIN — Medication 600 MILLIGRAM(S): at 13:02

## 2023-02-26 RX ADMIN — Medication 20 MILLIGRAM(S): at 09:10

## 2023-02-26 RX ADMIN — Medication 1200 MILLIGRAM(S): at 17:12

## 2023-02-26 RX ADMIN — Medication 50 MILLIGRAM(S): at 22:51

## 2023-02-26 RX ADMIN — Medication 81 MILLIGRAM(S): at 12:57

## 2023-02-26 RX ADMIN — Medication 600 MILLIGRAM(S): at 22:51

## 2023-02-26 RX ADMIN — Medication 50 MILLIGRAM(S): at 13:57

## 2023-02-26 RX ADMIN — HEPARIN SODIUM 5000 UNIT(S): 5000 INJECTION INTRAVENOUS; SUBCUTANEOUS at 06:52

## 2023-02-27 LAB
ANION GAP SERPL CALC-SCNC: 10 MMOL/L — SIGNIFICANT CHANGE UP (ref 7–14)
BUN SERPL-MCNC: 35 MG/DL — HIGH (ref 7–23)
CALCIUM SERPL-MCNC: 10.2 MG/DL — SIGNIFICANT CHANGE UP (ref 8.4–10.5)
CHLORIDE SERPL-SCNC: 107 MMOL/L — SIGNIFICANT CHANGE UP (ref 98–107)
CO2 SERPL-SCNC: 23 MMOL/L — SIGNIFICANT CHANGE UP (ref 22–31)
CREAT SERPL-MCNC: 2.23 MG/DL — HIGH (ref 0.5–1.3)
EGFR: 26 ML/MIN/1.73M2 — LOW
GLUCOSE SERPL-MCNC: 120 MG/DL — HIGH (ref 70–99)
HCT VFR BLD CALC: 42 % — SIGNIFICANT CHANGE UP (ref 34.5–45)
HGB BLD-MCNC: 12.8 G/DL — SIGNIFICANT CHANGE UP (ref 11.5–15.5)
MAGNESIUM SERPL-MCNC: 2.1 MG/DL — SIGNIFICANT CHANGE UP (ref 1.6–2.6)
MCHC RBC-ENTMCNC: 26.8 PG — LOW (ref 27–34)
MCHC RBC-ENTMCNC: 30.5 GM/DL — LOW (ref 32–36)
MCV RBC AUTO: 87.9 FL — SIGNIFICANT CHANGE UP (ref 80–100)
NRBC # BLD: 0 /100 WBCS — SIGNIFICANT CHANGE UP (ref 0–0)
NRBC # FLD: 0 K/UL — SIGNIFICANT CHANGE UP (ref 0–0)
PHOSPHATE SERPL-MCNC: 3.3 MG/DL — SIGNIFICANT CHANGE UP (ref 2.5–4.5)
PLATELET # BLD AUTO: 195 K/UL — SIGNIFICANT CHANGE UP (ref 150–400)
POTASSIUM SERPL-MCNC: 3.9 MMOL/L — SIGNIFICANT CHANGE UP (ref 3.5–5.3)
POTASSIUM SERPL-SCNC: 3.9 MMOL/L — SIGNIFICANT CHANGE UP (ref 3.5–5.3)
PTH-INTACT FLD-MCNC: 125 PG/ML — HIGH (ref 15–65)
RBC # BLD: 4.78 M/UL — SIGNIFICANT CHANGE UP (ref 3.8–5.2)
RBC # FLD: 14.1 % — SIGNIFICANT CHANGE UP (ref 10.3–14.5)
SODIUM SERPL-SCNC: 140 MMOL/L — SIGNIFICANT CHANGE UP (ref 135–145)
WBC # BLD: 4.98 K/UL — SIGNIFICANT CHANGE UP (ref 3.8–10.5)
WBC # FLD AUTO: 4.98 K/UL — SIGNIFICANT CHANGE UP (ref 3.8–10.5)

## 2023-02-27 RX ADMIN — ATORVASTATIN CALCIUM 80 MILLIGRAM(S): 80 TABLET, FILM COATED ORAL at 22:33

## 2023-02-27 RX ADMIN — Medication 600 MILLIGRAM(S): at 06:41

## 2023-02-27 RX ADMIN — Medication 600 MILLIGRAM(S): at 18:45

## 2023-02-27 RX ADMIN — AMLODIPINE BESYLATE 10 MILLIGRAM(S): 2.5 TABLET ORAL at 06:42

## 2023-02-27 RX ADMIN — Medication 50 MILLIGRAM(S): at 16:44

## 2023-02-27 RX ADMIN — Medication 1200 MILLIGRAM(S): at 06:41

## 2023-02-27 RX ADMIN — Medication 50 MILLIGRAM(S): at 06:42

## 2023-02-27 RX ADMIN — Medication 81 MILLIGRAM(S): at 14:24

## 2023-02-27 RX ADMIN — Medication 20 MILLIGRAM(S): at 06:41

## 2023-02-27 NOTE — CHART NOTE - NSCHARTNOTEFT_GEN_A_CORE
BERNABE HOFFMAN  [id number]    Patient status post LHC/RHC via right femoral artery/vein access. Site clean, dry, and intact. No hematoma or active bleeding. No blood noted on gauze. Extremities warm to touch. Pulses present b/l, capillary refill appropriate. No bruits noted on ausculation of femoral artery. Denies any pain, numbness or tingling. Will continue to monitor.    T(C): 36.5 (02-27-23 @ 20:38), Max: 37 (02-26-23 @ 22:45)  HR: 81 (02-27-23 @ 20:38) (81 - 94)  BP: 133/76 (02-27-23 @ 20:38) (130/70 - 153/76)  RR: 16 (02-27-23 @ 20:38) (16 - 18)  SpO2: 100% (02-27-23 @ 20:38) (99% - 100%)    Dannie Katz PA-C

## 2023-02-28 ENCOUNTER — TRANSCRIPTION ENCOUNTER (OUTPATIENT)
Age: 53
End: 2023-02-28

## 2023-02-28 VITALS
OXYGEN SATURATION: 100 % | HEART RATE: 85 BPM | TEMPERATURE: 99 F | SYSTOLIC BLOOD PRESSURE: 149 MMHG | RESPIRATION RATE: 17 BRPM | DIASTOLIC BLOOD PRESSURE: 79 MMHG

## 2023-02-28 LAB
ANION GAP SERPL CALC-SCNC: 9 MMOL/L — SIGNIFICANT CHANGE UP (ref 7–14)
BASOPHILS # BLD AUTO: 0.03 K/UL — SIGNIFICANT CHANGE UP (ref 0–0.2)
BASOPHILS NFR BLD AUTO: 0.6 % — SIGNIFICANT CHANGE UP (ref 0–2)
BUN SERPL-MCNC: 33 MG/DL — HIGH (ref 7–23)
CALCIUM SERPL-MCNC: 10.8 MG/DL — HIGH (ref 8.4–10.5)
CHLORIDE SERPL-SCNC: 106 MMOL/L — SIGNIFICANT CHANGE UP (ref 98–107)
CO2 SERPL-SCNC: 25 MMOL/L — SIGNIFICANT CHANGE UP (ref 22–31)
CREAT SERPL-MCNC: 2.15 MG/DL — HIGH (ref 0.5–1.3)
EGFR: 27 ML/MIN/1.73M2 — LOW
EOSINOPHIL # BLD AUTO: 0.22 K/UL — SIGNIFICANT CHANGE UP (ref 0–0.5)
EOSINOPHIL NFR BLD AUTO: 4.6 % — SIGNIFICANT CHANGE UP (ref 0–6)
GLUCOSE SERPL-MCNC: 120 MG/DL — HIGH (ref 70–99)
HCT VFR BLD CALC: 44.2 % — SIGNIFICANT CHANGE UP (ref 34.5–45)
HGB BLD-MCNC: 13.9 G/DL — SIGNIFICANT CHANGE UP (ref 11.5–15.5)
IANC: 2.75 K/UL — SIGNIFICANT CHANGE UP (ref 1.8–7.4)
IMM GRANULOCYTES NFR BLD AUTO: 0.4 % — SIGNIFICANT CHANGE UP (ref 0–0.9)
LYMPHOCYTES # BLD AUTO: 1.24 K/UL — SIGNIFICANT CHANGE UP (ref 1–3.3)
LYMPHOCYTES # BLD AUTO: 26.1 % — SIGNIFICANT CHANGE UP (ref 13–44)
MAGNESIUM SERPL-MCNC: 2.1 MG/DL — SIGNIFICANT CHANGE UP (ref 1.6–2.6)
MCHC RBC-ENTMCNC: 28 PG — SIGNIFICANT CHANGE UP (ref 27–34)
MCHC RBC-ENTMCNC: 31.4 GM/DL — LOW (ref 32–36)
MCV RBC AUTO: 88.9 FL — SIGNIFICANT CHANGE UP (ref 80–100)
MONOCYTES # BLD AUTO: 0.49 K/UL — SIGNIFICANT CHANGE UP (ref 0–0.9)
MONOCYTES NFR BLD AUTO: 10.3 % — SIGNIFICANT CHANGE UP (ref 2–14)
NEUTROPHILS # BLD AUTO: 2.75 K/UL — SIGNIFICANT CHANGE UP (ref 1.8–7.4)
NEUTROPHILS NFR BLD AUTO: 58 % — SIGNIFICANT CHANGE UP (ref 43–77)
NRBC # BLD: 0 /100 WBCS — SIGNIFICANT CHANGE UP (ref 0–0)
NRBC # FLD: 0 K/UL — SIGNIFICANT CHANGE UP (ref 0–0)
PHOSPHATE SERPL-MCNC: 3.6 MG/DL — SIGNIFICANT CHANGE UP (ref 2.5–4.5)
PLATELET # BLD AUTO: 204 K/UL — SIGNIFICANT CHANGE UP (ref 150–400)
POTASSIUM SERPL-MCNC: 4.1 MMOL/L — SIGNIFICANT CHANGE UP (ref 3.5–5.3)
POTASSIUM SERPL-SCNC: 4.1 MMOL/L — SIGNIFICANT CHANGE UP (ref 3.5–5.3)
RBC # BLD: 4.97 M/UL — SIGNIFICANT CHANGE UP (ref 3.8–5.2)
RBC # FLD: 14.4 % — SIGNIFICANT CHANGE UP (ref 10.3–14.5)
SODIUM SERPL-SCNC: 140 MMOL/L — SIGNIFICANT CHANGE UP (ref 135–145)
WBC # BLD: 4.75 K/UL — SIGNIFICANT CHANGE UP (ref 3.8–10.5)
WBC # FLD AUTO: 4.75 K/UL — SIGNIFICANT CHANGE UP (ref 3.8–10.5)

## 2023-02-28 RX ORDER — SACUBITRIL AND VALSARTAN 24; 26 MG/1; MG/1
1 TABLET, FILM COATED ORAL
Qty: 60 | Refills: 0
Start: 2023-02-28 | End: 2023-03-29

## 2023-02-28 RX ORDER — FUROSEMIDE 40 MG
1 TABLET ORAL
Qty: 30 | Refills: 0
Start: 2023-02-28 | End: 2023-03-29

## 2023-02-28 RX ORDER — LABETALOL HCL 100 MG
2 TABLET ORAL
Qty: 0 | Refills: 0 | DISCHARGE

## 2023-02-28 RX ORDER — CARVEDILOL PHOSPHATE 80 MG/1
1 CAPSULE, EXTENDED RELEASE ORAL
Qty: 60 | Refills: 0
Start: 2023-02-28 | End: 2023-03-29

## 2023-02-28 RX ADMIN — Medication 600 MILLIGRAM(S): at 05:49

## 2023-02-28 RX ADMIN — Medication 1200 MILLIGRAM(S): at 08:56

## 2023-02-28 RX ADMIN — AMLODIPINE BESYLATE 10 MILLIGRAM(S): 2.5 TABLET ORAL at 05:49

## 2023-02-28 RX ADMIN — Medication 81 MILLIGRAM(S): at 14:11

## 2023-02-28 RX ADMIN — HEPARIN SODIUM 5000 UNIT(S): 5000 INJECTION INTRAVENOUS; SUBCUTANEOUS at 14:04

## 2023-02-28 RX ADMIN — Medication 600 MILLIGRAM(S): at 14:04

## 2023-02-28 RX ADMIN — Medication 50 MILLIGRAM(S): at 14:04

## 2023-02-28 RX ADMIN — Medication 50 MILLIGRAM(S): at 05:49

## 2023-02-28 NOTE — PROGRESS NOTE ADULT - SUBJECTIVE AND OBJECTIVE BOX
[facility] - RENATO Mehta / RENATO Pride / NESHA Daniel/ RENATO Rocha/ RENATO Barr/ STORM Su / FLORENCIO Malone / NINFA Thorne  ---------------------------------------------------------------------------------------------------------------  seen and examined today for CKD 4  Interval : S/P cardiac cath yesterday  VITALS:  T(F): 98.8 (02-28-23 @ 11:19), Max: 98.8 (02-28-23 @ 11:19)  HR: 85 (02-28-23 @ 14:00)  BP: 149/79 (02-28-23 @ 14:00)  RR: 17 (02-28-23 @ 11:19)  SpO2: 100% (02-28-23 @ 11:19)  Wt(kg): --    Physical Exam :-  Constitutional: NAD  Neck: Supple.  Respiratory: Bilateral equal breath sounds,  Cardiovascular: S1, S2 normal,  Gastrointestinal: Bowel Sounds present, soft, non tender.  Extremities: No edema  Neurological: Alert and Oriented x 3, no focal deficits  Psychiatric: Normal mood, normal affect  Data:-  Allergies :   No Known Allergies    Hospital Medications:   MEDICATIONS  (STANDING):  acetylcysteine  Oral Solution 1200 milliGRAM(s) Oral every 12 hours  amLODIPine   Tablet 10 milliGRAM(s) Oral daily  aspirin  chewable 81 milliGRAM(s) Oral daily  atorvastatin 80 milliGRAM(s) Oral at bedtime  dextrose 5%. 1000 milliLiter(s) (50 mL/Hr) IV Continuous <Continuous>  dextrose 50% Injectable 25 Gram(s) IV Push once  doxazosin 8 milliGRAM(s) Oral at bedtime  glucagon  Injectable 1 milliGRAM(s) IntraMuscular once  heparin   Injectable 5000 Unit(s) SubCutaneous every 8 hours  hydrALAZINE 50 milliGRAM(s) Oral three times a day  insulin lispro (ADMELOG) corrective regimen sliding scale   SubCutaneous three times a day before meals  labetalol 600 milliGRAM(s) Oral three times a day    02-28    140  |  106  |  33<H>  ----------------------------<  120<H>  4.1   |  25  |  2.15<H>    Ca    10.8<H>      28 Feb 2023 06:55  Phos  3.6     02-28  Mg     2.10     02-28      Creatinine Trend: 2.15 <--, 2.23 <--, 2.41 <--, 2.12 <--, 2.07 <--                        13.9   4.75  )-----------( 204      ( 28 Feb 2023 06:55 )             44.2   
[facility] - S Tyson / Bigg S /D Fredy/ S Josefina/ S Cortney/ Taj Su / GABRIEL Malone/ O Mati  service -[phone], office [phone]  ---------------------------------------------------------------------------------------------------------------    Patient seen and examined bedside    Subjective and Objective: No overnight events, denied cp/urinary sxs/N/V/D/sob. No complaints today.    Allergies: No Known Allergies      Hospital Medications:   MEDICATIONS  (STANDING):  acetylcysteine  Oral Solution 1200 milliGRAM(s) Oral every 12 hours  amLODIPine   Tablet 10 milliGRAM(s) Oral daily  aspirin  chewable 81 milliGRAM(s) Oral daily  atorvastatin 80 milliGRAM(s) Oral at bedtime  dextrose 5%. 1000 milliLiter(s) (50 mL/Hr) IV Continuous <Continuous>  dextrose 50% Injectable 25 Gram(s) IV Push once  doxazosin 8 milliGRAM(s) Oral at bedtime  furosemide   Injectable 20 milliGRAM(s) IV Push daily  glucagon  Injectable 1 milliGRAM(s) IntraMuscular once  heparin   Injectable 5000 Unit(s) SubCutaneous every 8 hours  hydrALAZINE 50 milliGRAM(s) Oral three times a day  insulin lispro (ADMELOG) corrective regimen sliding scale   SubCutaneous three times a day before meals  labetalol 600 milliGRAM(s) Oral three times a day    VITALS:  T(F): 98 (23 @ 16:00), Max: 98.7 (23 @ 05:45)  HR: 89 (23 @ 16:00)  BP: 140/80 (23 @ 16:00)  RR: 16 (23 @ 16:00)  SpO2: 100% (23 @ 16:00)  Wt(kg): --     @ 07:01  -   @ 18:29  --------------------------------------------------------  IN: 1000 mL / OUT: 800 mL / NET: 200 mL    Weight (kg): 93 ( @ 08:00)    PHYSICAL EXAM:  Constitutional: NAD, obese  HEENT: anicteric sclera  Neck: No JVD  Respiratory: CTAB, no wheezes, rales or rhonchi  Cardiovascular: S1, S2, RRR  Gastrointestinal: BS+, soft, NT/ND  Extremities: no pedal edema b/l   Neurological: A/O x 3  Psychiatric: Normal mood, normal affect  : No diaz.     LABS:      139  |  105  |  36<H>  ----------------------------<  143<H>  3.8   |  26  |  2.41<H>    Ca    10.9<H>      2023 07:31  Phos  3.8       Mg     2.10           Creatinine Trend: 2.41 <--, 2.12 <--, 2.07 <--                        13.2   5.52  )-----------( 205      ( 2023 07:31 )             43.7     Urine Studies:  Urinalysis Basic - ( 2023 16:40 )    Color: Light Yellow / Appearance: Clear / S.016 / pH:   Gluc:  / Ketone: Negative  / Bili: Negative / Urobili: <2 mg/dL   Blood:  / Protein: 100 mg/dL / Nitrite: Negative   Leuk Esterase: Large / RBC: 1 /HPF / WBC 18 /HPF   Sq Epi:  / Non Sq Epi: 10 /HPF / Bacteria: Few          RADIOLOGY & ADDITIONAL STUDIES:  
Patient is a 52y old  Female who presents with a chief complaint of chest pain (2023 13:44)      INTERVAL HPI/OVERNIGHT EVENTS: s/p cardiac cath  T(C): 36.5 (23 @ 20:38), Max: 37 (23 @ 06:30)  HR: 90 (23 @ 22:31) (81 - 94)  BP: 109/52 (23 @ 22:31) (109/52 - 149/62)  RR: 16 (23 @ 22:31) (16 - 18)  SpO2: 100% (23 @ 22:31) (99% - 100%)  Wt(kg): --  I&O's Summary    2023 07:01  -  2023 07:00  --------------------------------------------------------  IN: 1000 mL / OUT: 800 mL / NET: 200 mL        PAST MEDICAL & SURGICAL HISTORY:  HTN (Hypertension)      DM (Diabetes Mellitus)      CRI (Chronic Renal Insufficiency)      Fibroids  uterine fibroids S/P Surgical Resolution      Hypertension      Leiomyoma      CHF (congestive heart failure)  episode prior to placed on heart medications      Hypertension      Diabetes mellitus  type 2      Hyperlipidemia      CHF (congestive heart failure)        CVA (cerebral vascular accident)  ischemic 2019      Excessive and frequent menstruation      Fibroid      Stage 3 chronic kidney disease      History of  Section      H/O myomectomy         delivery delivered        Fibroid  excision of fibroid in       Termination of pregnancy (fetus)  x 2      Miscarriage  x 2      S/P myomectomy         delivery delivered        H/O: hysterectomy          SOCIAL HISTORY  Alcohol:  Tobacco:  Illicit substance use:    FAMILY HISTORY:    REVIEW OF SYSTEMS:  CONSTITUTIONAL: No fever, weight loss, or fatigue  EYES: No eye pain, visual disturbances, or discharge  ENMT:  No difficulty hearing, tinnitus, vertigo; No sinus or throat pain  NECK: No pain or stiffness  RESPIRATORY: No cough, wheezing, chills or hemoptysis; No shortness of breath  CARDIOVASCULAR: No chest pain, palpitations, dizziness, or leg swelling  GASTROINTESTINAL: No abdominal or epigastric pain. No nausea, vomiting, or hematemesis; No diarrhea or constipation. No melena or hematochezia.  GENITOURINARY: No dysuria, frequency, hematuria, or incontinence  NEUROLOGICAL: No headaches, memory loss, loss of strength, numbness, or tremors  SKIN: No itching, burning, rashes, or lesions   LYMPH NODES: No enlarged glands  ENDOCRINE: No heat or cold intolerance; No hair loss  MUSCULOSKELETAL: No joint pain or swelling; No muscle, back, or extremity pain  PSYCHIATRIC: No depression, anxiety, mood swings, or difficulty sleeping  HEME/LYMPH: No easy bruising, or bleeding gums  ALLERY AND IMMUNOLOGIC: No hives or eczema    RADIOLOGY & ADDITIONAL TESTS:    Imaging Personally Reviewed:  [ ] YES  [ ] NO    Consultant(s) Notes Reviewed:  [ ] YES  [ ] NO    PHYSICAL EXAM:  GENERAL: NAD, well-groomed, well-developed  HEAD:  Atraumatic, Normocephalic  EYES: EOMI, PERRLA, conjunctiva and sclera clear  ENMT: No tonsillar erythema, exudates, or enlargement; Moist mucous membranes, Good dentition, No lesions  NECK: Supple, No JVD, Normal thyroid  NERVOUS SYSTEM:  Alert & Oriented X3, Good concentration; Motor Strength 5/5 B/L upper and lower extremities; DTRs 2+ intact and symmetric  CHEST/LUNG: Clear to percussion bilaterally; No rales, rhonchi, wheezing, or rubs  HEART: Regular rate and rhythm; No murmurs, rubs, or gallops  ABDOMEN: Soft, Nontender, Nondistended; Bowel sounds present  EXTREMITIES:  2+ Peripheral Pulses, No clubbing, cyanosis, or edema  LYMPH: No lymphadenopathy noted  SKIN: No rashes or lesions    LABS:                        12.8   4.98  )-----------( 195      ( 2023 07:30 )             42.0         140  |  107  |  35<H>  ----------------------------<  120<H>  3.9   |  23  |  2.23<H>    Ca    10.2      2023 07:30  Phos  3.3       Mg     2.10               CAPILLARY BLOOD GLUCOSE      POCT Blood Glucose.: 102 mg/dL (2023 19:49)  POCT Blood Glucose.: 163 mg/dL (2023 15:12)  POCT Blood Glucose.: 91 mg/dL (2023 12:22)  POCT Blood Glucose.: 133 mg/dL (2023 09:18)    ABG - ( 2023 17:53 )  pH, Arterial: x     pH, Blood: x     /  pCO2: x     /  pO2: x     / HCO3: x     / Base Excess: x     /  SaO2: 95.0                    MEDICATIONS  (STANDING):  acetylcysteine  Oral Solution 1200 milliGRAM(s) Oral every 12 hours  amLODIPine   Tablet 10 milliGRAM(s) Oral daily  aspirin  chewable 81 milliGRAM(s) Oral daily  atorvastatin 80 milliGRAM(s) Oral at bedtime  dextrose 5%. 1000 milliLiter(s) (50 mL/Hr) IV Continuous <Continuous>  dextrose 50% Injectable 25 Gram(s) IV Push once  doxazosin 8 milliGRAM(s) Oral at bedtime  glucagon  Injectable 1 milliGRAM(s) IntraMuscular once  heparin   Injectable 5000 Unit(s) SubCutaneous every 8 hours  hydrALAZINE 50 milliGRAM(s) Oral three times a day  insulin lispro (ADMELOG) corrective regimen sliding scale   SubCutaneous three times a day before meals  labetalol 600 milliGRAM(s) Oral three times a day    MEDICATIONS  (PRN):  acetaminophen     Tablet .. 650 milliGRAM(s) Oral every 6 hours PRN Temp greater or equal to 38C (100.4F), Mild Pain (1 - 3)  aluminum hydroxide/magnesium hydroxide/simethicone Suspension 30 milliLiter(s) Oral every 4 hours PRN Dyspepsia  dextrose Oral Gel 15 Gram(s) Oral once PRN Blood Glucose LESS THAN 70 milliGRAM(s)/deciliter  melatonin 3 milliGRAM(s) Oral at bedtime PRN Insomnia  ondansetron Injectable 4 milliGRAM(s) IV Push every 8 hours PRN Nausea and/or Vomiting      Care Discussed with Consultants/Other Providers [ ] YES  [ ] NO
CARDIOLOGY FOLLOW UP NOTE - DR. DWYER    Patient Name: BERNABE HOFFMAN  Date of Service: 23 @ 12:13    Patient seen and examined    Subjective:    cv: denies chest pain, dyspnea, palpitations, dizziness  pulmonary: denies cough  GI: denies abdominal pain, nausea, vomiting  vascular/legs: no edema   skin: no rash  ROS: otherwise negative   overnight events:      PHYSICAL EXAM:  T(C): 36.7 (23 @ 10:19), Max: 37.1 (23 @ 20:00)  HR: 84 (23 @ 10:19) (84 - 94)  BP: 142/82 (23 @ 10:19) (130/70 - 153/76)  RR: 18 (23 @ 10:19) (16 - 18)  SpO2: 100% (23 @ 10:19) (99% - 100%)  Wt(kg): --  I&O's Summary    2023 07:01  -  2023 07:00  --------------------------------------------------------  IN: 1000 mL / OUT: 800 mL / NET: 200 mL      Daily     Daily Weight in k.8 (2023 06:30)    Appearance: Normal	  Cardiovascular: Normal S1 S2,RRR, No JVD, No murmurs  Respiratory: Lungs clear to auscultation	  Gastrointestinal:  Soft, Non-tender, + BS	  Extremities: Normal range of motion, No clubbing, cyanosis or edema      Home Medications:  amLODIPine 10 mg oral tablet: 1 tab(s) orally once a day (2023 03:05)  aspirin 81 mg oral tablet: 1 tab(s) orally once a day (2023 03:05)  doxazosin 8 mg oral tablet: 1 tab(s) orally once a day (2023 03:05)  hydrALAZINE 50 mg oral tablet: 1 tab(s) orally 3 times a day (2023 03:05)  labetalol 300 mg oral tablet: 2 tab(s) orally 3 times a day (2023 03:05)  Ozempic 2 mg/1.5 mL (0.25 mg or 0.5 mg dose) subcutaneous solution: 0.5 milligram(s) subcutaneous once a week (2023 03:05)      MEDICATIONS  (STANDING):  acetylcysteine  Oral Solution 1200 milliGRAM(s) Oral every 12 hours  amLODIPine   Tablet 10 milliGRAM(s) Oral daily  aspirin  chewable 81 milliGRAM(s) Oral daily  atorvastatin 80 milliGRAM(s) Oral at bedtime  dextrose 5%. 1000 milliLiter(s) (50 mL/Hr) IV Continuous <Continuous>  dextrose 50% Injectable 25 Gram(s) IV Push once  doxazosin 8 milliGRAM(s) Oral at bedtime  furosemide   Injectable 20 milliGRAM(s) IV Push daily  glucagon  Injectable 1 milliGRAM(s) IntraMuscular once  heparin   Injectable 5000 Unit(s) SubCutaneous every 8 hours  hydrALAZINE 50 milliGRAM(s) Oral three times a day  insulin lispro (ADMELOG) corrective regimen sliding scale   SubCutaneous three times a day before meals  labetalol 600 milliGRAM(s) Oral three times a day      TELEMETRY: 	    ECG:  	  RADIOLOGY:   DIAGNOSTIC TESTING:  [ ] Echocardiogram:  [ ] Catheterization:  [ ] Stress Test:    OTHER: 	    LABS:	 	    CARDIAC MARKERS:        Troponin T, High Sensitivity Result: 50 ng/L ( @ 23:56)  Troponin T, High Sensitivity Result: 51 ng/L ( @ 12:22)  Troponin T, High Sensitivity Result: 54 ng/L ( @ 10:26)                                12.8   4.98  )-----------( 195      ( 2023 07:30 )             42.0         140  |  107  |  35<H>  ----------------------------<  120<H>  3.9   |  23  |  2.23<H>    Ca    10.2      2023 07:30  Phos  3.3       Mg     2.10           proBNP:     Lipid Profile:   HgA1c:     Creatinine, Serum: 2.23 mg/dL (23 @ 07:30)  Creatinine, Serum: 2.41 mg/dL (23 @ 07:31)            
[facility] - RENATO Mehta / RENATO Pride / NESHA Daniel/ RENATO Rocha/ RENATO Barr/ STORM Su / FLORENCIO Malone / NINFA Thorne  ---------------------------------------------------------------------------------------------------------------  seen and examined today for Stable CKD 4  Interval : serum creatinine stable  VITALS:  T(F): 98 (02-27-23 @ 10:19), Max: 98.7 (02-26-23 @ 20:00)  HR: 84 (02-27-23 @ 10:19)  BP: 142/82 (02-27-23 @ 10:19)  RR: 18 (02-27-23 @ 10:19)  SpO2: 100% (02-27-23 @ 10:19)  Wt(kg): --    02-26 @ 07:01  -  02-27 @ 07:00  --------------------------------------------------------  IN: 1000 mL / OUT: 800 mL / NET: 200 mL      Physical Exam :-  Constitutional: NAD  Neck: Supple.  Respiratory: Bilateral equal breath sounds,  Cardiovascular: S1, S2 normal,  Gastrointestinal: Bowel Sounds present, soft, non tender.  Extremities: No edema  Neurological: Alert and Oriented x 3, no focal deficits  Psychiatric: Normal mood, normal affect  Data:-  Allergies :   No Known Allergies    Hospital Medications:   MEDICATIONS  (STANDING):  acetylcysteine  Oral Solution 1200 milliGRAM(s) Oral every 12 hours  amLODIPine   Tablet 10 milliGRAM(s) Oral daily  aspirin  chewable 81 milliGRAM(s) Oral daily  atorvastatin 80 milliGRAM(s) Oral at bedtime  dextrose 5%. 1000 milliLiter(s) (50 mL/Hr) IV Continuous <Continuous>  dextrose 50% Injectable 25 Gram(s) IV Push once  doxazosin 8 milliGRAM(s) Oral at bedtime  furosemide   Injectable 20 milliGRAM(s) IV Push daily  glucagon  Injectable 1 milliGRAM(s) IntraMuscular once  heparin   Injectable 5000 Unit(s) SubCutaneous every 8 hours  hydrALAZINE 50 milliGRAM(s) Oral three times a day  insulin lispro (ADMELOG) corrective regimen sliding scale   SubCutaneous three times a day before meals  labetalol 600 milliGRAM(s) Oral three times a day    02-27    140  |  107  |  35<H>  ----------------------------<  120<H>  3.9   |  23  |  2.23<H>    Ca    10.2      27 Feb 2023 07:30  Phos  3.3     02-27  Mg     2.10     02-27      Creatinine Trend: 2.23 <--, 2.41 <--, 2.12 <--, 2.07 <--                        12.8   4.98  )-----------( 195      ( 27 Feb 2023 07:30 )             42.0   
CARDIOLOGY FOLLOW UP - Dr. Galvan  Date of Service: 2/26/23  CC: no events    Review of Systems:  Constitutional: No fever, weight loss, or fatigue  Respiratory: No cough, wheezing, or hemoptysis, no shortness of breath  Cardiovascular: No chest pain, palpitations, passing out, dizziness, or leg swelling  Gastrointestinal: No abd or epigastric pain. No nausea, vomiting, or hematemesis; no diarrhea or consiptaiton, no melena or hematochezia  Vascular: No edema     TELEMETRY:    PHYSICAL EXAM:  T(C): 36.7 (02-26-23 @ 08:00), Max: 37.1 (02-26-23 @ 05:45)  HR: 90 (02-26-23 @ 08:00) (85 - 91)  BP: 150/80 (02-26-23 @ 08:00) (142/84 - 168/91)  RR: 16 (02-26-23 @ 08:00) (16 - 18)  SpO2: 100% (02-26-23 @ 08:00) (98% - 100%)  Wt(kg): --  I&O's Summary    26 Feb 2023 07:01  -  26 Feb 2023 10:27  --------------------------------------------------------  IN: 360 mL / OUT: 300 mL / NET: 60 mL        Appearance: Normal	  Cardiovascular: Normal S1 S2,RRR, No JVD, No murmurs  Respiratory: Lungs clear to auscultation	  Gastrointestinal:  Soft, Non-tender, + BS	  Extremities: Normal range of motion, No clubbing, cyanosis or edema  Vascular: Peripheral pulses palpable 2+ bilaterally       Home Medications:  amLODIPine 10 mg oral tablet: 1 tab(s) orally once a day (25 Feb 2023 03:05)  aspirin 81 mg oral tablet: 1 tab(s) orally once a day (25 Feb 2023 03:05)  doxazosin 8 mg oral tablet: 1 tab(s) orally once a day (25 Feb 2023 03:05)  hydrALAZINE 50 mg oral tablet: 1 tab(s) orally 3 times a day (25 Feb 2023 03:05)  labetalol 300 mg oral tablet: 2 tab(s) orally 3 times a day (25 Feb 2023 03:05)  Ozempic 2 mg/1.5 mL (0.25 mg or 0.5 mg dose) subcutaneous solution: 0.5 milligram(s) subcutaneous once a week (25 Feb 2023 03:05)        MEDICATIONS  (STANDING):  acetylcysteine  Oral Solution 1200 milliGRAM(s) Oral every 12 hours  amLODIPine   Tablet 10 milliGRAM(s) Oral daily  aspirin  chewable 81 milliGRAM(s) Oral daily  atorvastatin 80 milliGRAM(s) Oral at bedtime  dextrose 5%. 1000 milliLiter(s) (50 mL/Hr) IV Continuous <Continuous>  dextrose 50% Injectable 25 Gram(s) IV Push once  doxazosin 8 milliGRAM(s) Oral at bedtime  furosemide   Injectable 20 milliGRAM(s) IV Push daily  glucagon  Injectable 1 milliGRAM(s) IntraMuscular once  heparin   Injectable 5000 Unit(s) SubCutaneous every 8 hours  hydrALAZINE 50 milliGRAM(s) Oral three times a day  insulin lispro (ADMELOG) corrective regimen sliding scale   SubCutaneous three times a day before meals  labetalol 600 milliGRAM(s) Oral three times a day        EKG:  RADIOLOGY:  DIAGNOSTIC TESTING:  [ ] Echocardiogram:  [ ] Catherterization:  [ ] Stress Test:  OTHER:     LABS:	 	                          13.2   5.52  )-----------( 205      ( 26 Feb 2023 07:31 )             43.7     02-26    139  |  105  |  36<H>  ----------------------------<  143<H>  3.8   |  26  |  2.41<H>    Ca    10.9<H>      26 Feb 2023 07:31  Phos  3.8     02-26  Mg     2.10     02-26            CARDIAC MARKERS:                  
CARDIOLOGY FOLLOW UP - Dr. Galvan  Date of Service: 2/28/23  CC: no cp/sob    Review of Systems:  Constitutional: No fever, weight loss, or fatigue  Respiratory: No cough, wheezing, or hemoptysis, no shortness of breath  Cardiovascular: No chest pain, palpitations, passing out, dizziness, or leg swelling  Gastrointestinal: No abd or epigastric pain. No nausea, vomiting, or hematemesis; no diarrhea or consiptaiton, no melena or hematochezia  Vascular: No edema     TELEMETRY:    PHYSICAL EXAM:  T(C): 37.1 (02-28-23 @ 11:19), Max: 37.1 (02-28-23 @ 11:19)  HR: 89 (02-28-23 @ 11:19) (81 - 94)  BP: 130/60 (02-28-23 @ 11:19) (109/52 - 156/89)  RR: 17 (02-28-23 @ 11:19) (16 - 17)  SpO2: 100% (02-28-23 @ 11:19) (100% - 100%)  Wt(kg): --  I&O's Summary      Appearance: Normal	  Cardiovascular: Normal S1 S2,RRR, No JVD, No murmurs  Respiratory: Lungs clear to auscultation	  Gastrointestinal:  Soft, Non-tender, + BS	  Extremities: Normal range of motion, No clubbing, cyanosis or edema  Vascular: Peripheral pulses palpable 2+ bilaterally       Home Medications:  amLODIPine 10 mg oral tablet: 1 tab(s) orally once a day (25 Feb 2023 03:05)  aspirin 81 mg oral tablet: 1 tab(s) orally once a day (25 Feb 2023 03:05)  doxazosin 8 mg oral tablet: 1 tab(s) orally once a day (25 Feb 2023 03:05)  hydrALAZINE 50 mg oral tablet: 1 tab(s) orally 3 times a day (25 Feb 2023 03:05)  labetalol 300 mg oral tablet: 2 tab(s) orally 3 times a day (25 Feb 2023 03:05)  Ozempic 2 mg/1.5 mL (0.25 mg or 0.5 mg dose) subcutaneous solution: 0.5 milligram(s) subcutaneous once a week (25 Feb 2023 03:05)        MEDICATIONS  (STANDING):  acetylcysteine  Oral Solution 1200 milliGRAM(s) Oral every 12 hours  amLODIPine   Tablet 10 milliGRAM(s) Oral daily  aspirin  chewable 81 milliGRAM(s) Oral daily  atorvastatin 80 milliGRAM(s) Oral at bedtime  dextrose 5%. 1000 milliLiter(s) (50 mL/Hr) IV Continuous <Continuous>  dextrose 50% Injectable 25 Gram(s) IV Push once  doxazosin 8 milliGRAM(s) Oral at bedtime  glucagon  Injectable 1 milliGRAM(s) IntraMuscular once  heparin   Injectable 5000 Unit(s) SubCutaneous every 8 hours  hydrALAZINE 50 milliGRAM(s) Oral three times a day  insulin lispro (ADMELOG) corrective regimen sliding scale   SubCutaneous three times a day before meals  labetalol 600 milliGRAM(s) Oral three times a day        EKG:  RADIOLOGY:  DIAGNOSTIC TESTING:  [ ] Echocardiogram:  [ ] Catherterization:  [ ] Stress Test:  OTHER:     LABS:	 	                          13.9   4.75  )-----------( 204      ( 28 Feb 2023 06:55 )             44.2     02-28    140  |  106  |  33<H>  ----------------------------<  120<H>  4.1   |  25  |  2.15<H>    Ca    10.8<H>      28 Feb 2023 06:55  Phos  3.6     02-28  Mg     2.10     02-28            CARDIAC MARKERS:                  
Patient is a 52y old  Female who presents with a chief complaint of chest pain (2023 18:29)      INTERVAL HPI/OVERNIGHT EVENTS: Scheduled for Cardiac cath in am   T(C): 36.9 (23 @ 02:41), Max: 37.1 (23 @ 20:00)  HR: 94 (23 @ 02:41) (89 - 97)  BP: 130/70 (23 @ 02:41) (130/70 - 153/76)  RR: 18 (23 @ 02:41) (16 - 18)  SpO2: 99% (23 @ 02:41) (99% - 100%)  Wt(kg): --  I&O's Summary    2023 07:01  -  2023 05:50  --------------------------------------------------------  IN: 1000 mL / OUT: 800 mL / NET: 200 mL        PAST MEDICAL & SURGICAL HISTORY:  HTN (Hypertension)      DM (Diabetes Mellitus)      CRI (Chronic Renal Insufficiency)      Fibroids  uterine fibroids S/P Surgical Resolution      Hypertension      Leiomyoma      CHF (congestive heart failure)  episode prior to placed on heart medications      Hypertension      Diabetes mellitus  type 2      Hyperlipidemia      CHF (congestive heart failure)        CVA (cerebral vascular accident)  ischemic       Excessive and frequent menstruation      Fibroid      Stage 3 chronic kidney disease      History of  Section      H/O myomectomy         delivery delivered        Fibroid  excision of fibroid in       Termination of pregnancy (fetus)  x 2      Miscarriage  x 2      S/P myomectomy         delivery delivered        H/O: hysterectomy          SOCIAL HISTORY  Alcohol:  Tobacco:  Illicit substance use:    FAMILY HISTORY:    REVIEW OF SYSTEMS:  CONSTITUTIONAL: No fever, weight loss, or fatigue  EYES: No eye pain, visual disturbances, or discharge  ENMT:  No difficulty hearing, tinnitus, vertigo; No sinus or throat pain  NECK: No pain or stiffness  RESPIRATORY: No cough, wheezing, chills or hemoptysis; No shortness of breath  CARDIOVASCULAR: No chest pain, palpitations, dizziness, or leg swelling  GASTROINTESTINAL: No abdominal or epigastric pain. No nausea, vomiting, or hematemesis; No diarrhea or constipation. No melena or hematochezia.  GENITOURINARY: No dysuria, frequency, hematuria, or incontinence  NEUROLOGICAL: No headaches, memory loss, loss of strength, numbness, or tremors  SKIN: No itching, burning, rashes, or lesions   LYMPH NODES: No enlarged glands  ENDOCRINE: No heat or cold intolerance; No hair loss  MUSCULOSKELETAL: No joint pain or swelling; No muscle, back, or extremity pain  PSYCHIATRIC: No depression, anxiety, mood swings, or difficulty sleeping  HEME/LYMPH: No easy bruising, or bleeding gums  ALLERY AND IMMUNOLOGIC: No hives or eczema    RADIOLOGY & ADDITIONAL TESTS:    Imaging Personally Reviewed:  [ ] YES  [ ] NO    Consultant(s) Notes Reviewed:  [ ] YES  [ ] NO    PHYSICAL EXAM:  GENERAL: NAD, well-groomed, well-developed  HEAD:  Atraumatic, Normocephalic  EYES: EOMI, PERRLA, conjunctiva and sclera clear  ENMT: No tonsillar erythema, exudates, or enlargement; Moist mucous membranes, Good dentition, No lesions  NECK: Supple, No JVD, Normal thyroid  NERVOUS SYSTEM:  Alert & Oriented X3, Good concentration; Motor Strength 5/5 B/L upper and lower extremities; DTRs 2+ intact and symmetric  CHEST/LUNG: Clear to percussion bilaterally; No rales, rhonchi, wheezing, or rubs  HEART: Regular rate and rhythm; No murmurs, rubs, or gallops  ABDOMEN: Soft, Nontender, Nondistended; Bowel sounds present  EXTREMITIES:  2+ Peripheral Pulses, No clubbing, cyanosis, or edema  LYMPH: No lymphadenopathy noted  SKIN: No rashes or lesions    LABS:                        13.2   5.52  )-----------( 205      ( 2023 07:31 )             43.7         139  |  105  |  36<H>  ----------------------------<  143<H>  3.8   |  26  |  2.41<H>    Ca    10.9<H>      2023 07:31  Phos  3.8       Mg     2.10               CAPILLARY BLOOD GLUCOSE      POCT Blood Glucose.: 120 mg/dL (2023 22:19)  POCT Blood Glucose.: 128 mg/dL (2023 17:11)  POCT Blood Glucose.: 150 mg/dL (2023 12:13)  POCT Blood Glucose.: 111 mg/dL (2023 08:29)            MEDICATIONS  (STANDING):  acetylcysteine  Oral Solution 1200 milliGRAM(s) Oral every 12 hours  amLODIPine   Tablet 10 milliGRAM(s) Oral daily  aspirin  chewable 81 milliGRAM(s) Oral daily  atorvastatin 80 milliGRAM(s) Oral at bedtime  dextrose 5%. 1000 milliLiter(s) (50 mL/Hr) IV Continuous <Continuous>  dextrose 50% Injectable 25 Gram(s) IV Push once  doxazosin 8 milliGRAM(s) Oral at bedtime  furosemide   Injectable 20 milliGRAM(s) IV Push daily  glucagon  Injectable 1 milliGRAM(s) IntraMuscular once  heparin   Injectable 5000 Unit(s) SubCutaneous every 8 hours  hydrALAZINE 50 milliGRAM(s) Oral three times a day  insulin lispro (ADMELOG) corrective regimen sliding scale   SubCutaneous three times a day before meals  labetalol 600 milliGRAM(s) Oral three times a day    MEDICATIONS  (PRN):  acetaminophen     Tablet .. 650 milliGRAM(s) Oral every 6 hours PRN Temp greater or equal to 38C (100.4F), Mild Pain (1 - 3)  aluminum hydroxide/magnesium hydroxide/simethicone Suspension 30 milliLiter(s) Oral every 4 hours PRN Dyspepsia  dextrose Oral Gel 15 Gram(s) Oral once PRN Blood Glucose LESS THAN 70 milliGRAM(s)/deciliter  melatonin 3 milliGRAM(s) Oral at bedtime PRN Insomnia  ondansetron Injectable 4 milliGRAM(s) IV Push every 8 hours PRN Nausea and/or Vomiting      Care Discussed with Consultants/Other Providers [ ] YES  [ ] NO

## 2023-02-28 NOTE — PROVIDER CONTACT NOTE (OTHER) - ACTION/TREATMENT ORDERED:
Provider will review chart and order hydralazine for 00:00 2/24/23. Will continue to monitor.
FORTINO Dukes made aware. No new orders at this time.
No new interventions ordered at this time. Will continue to monitor
NP notified   insulin held

## 2023-02-28 NOTE — DISCHARGE NOTE PROVIDER - NSDCFUSCHEDAPPT_GEN_ALL_CORE_FT
Soledad Rascon  [facility]  [address]  Scheduled Appointment: 03/23/2023

## 2023-02-28 NOTE — PROVIDER CONTACT NOTE (OTHER) - REASON
Patient refused dinner time insulin coverage
Pt /104 with headache
pt refusing FS
Pt /92 and asymptomatic

## 2023-02-28 NOTE — PROVIDER CONTACT NOTE (OTHER) - BACKGROUND
Pt c/o epigastric pain/ HTN. PMH DM, HTN, CVA (no deficts on ASA) & CHF
Patient admitted for chest pain. PMHx of DM, CHF, HLD, HTN,
Pt c/o epigastric pain & HTN, PMH of DM, HTN, CVA (no deficits, on ASA), CHF & p/w CP
DM   insulin lispro corrective sliding scale

## 2023-02-28 NOTE — DISCHARGE NOTE PROVIDER - NSDCCPCAREPLAN_GEN_ALL_CORE_FT
PRINCIPAL DISCHARGE DIAGNOSIS  Diagnosis: Chest pain  Assessment and Plan of Treatment: You came into the hospital with an abnormal stress test, you were amditted for a cath, you recevied one, you are to start taking a water pill LASIX and a medication for your heart called ENTRESTO, please follow up with the cardiologist within 1-2 weeks after D/C      SECONDARY DISCHARGE DIAGNOSES  Diagnosis: Diabetes mellitus  Assessment and Plan of Treatment: Please continue home medications and follow up with your PCP    Diagnosis: Chronic systolic heart failure  Assessment and Plan of Treatment: Please Start taking the water pill LASIX it was sent to the pharmacy    Diagnosis: Hypertension  Assessment and Plan of Treatment: Please continue home medications and follow up with your PCP    Diagnosis: Stage 4 chronic kidney disease  Assessment and Plan of Treatment: Please continue home medications and follow up with your PCP

## 2023-02-28 NOTE — DISCHARGE NOTE NURSING/CASE MANAGEMENT/SOCIAL WORK - PATIENT PORTAL LINK FT
You can access the FollowMyHealth Patient Portal offered by [facility] by registering at the following website: [email or web address]. By joining [employer] [facility]’s FollowMyHealth portal, you will also be able to view your health information using other applications (apps) compatible with our system.

## 2023-02-28 NOTE — PROGRESS NOTE ADULT - PROVIDER SPECIALTY LIST ADULT
Internal Medicine
Cardiology
Nephrology
Cardiology
Cardiology
Internal Medicine
Nephrology
Nephrology

## 2023-02-28 NOTE — DISCHARGE NOTE NURSING/CASE MANAGEMENT/SOCIAL WORK - NSDCPEFALRISK_GEN_ALL_CORE
For information on Fall & Injury Prevention, visit: [email or web address] OR  [email or web address] OR  https://www.cdc.gov/steadi/patient.html

## 2023-02-28 NOTE — DISCHARGE NOTE PROVIDER - NSDCMRMEDTOKEN_GEN_ALL_CORE_FT
amLODIPine 10 mg oral tablet: 1 tab(s) orally once a day  aspirin 81 mg oral tablet: 1 tab(s) orally once a day  doxazosin 8 mg oral tablet: 1 tab(s) orally once a day  Entresto 24 mg-26 mg oral tablet: 1 tab(s) orally 2 times a day     furosemide 20 mg oral tablet: 1 tab(s) orally once a day   hydrALAZINE 50 mg oral tablet: 1 tab(s) orally 3 times a day  labetalol 300 mg oral tablet: 2 tab(s) orally 3 times a day  Ozempic 2 mg/1.5 mL (0.25 mg or 0.5 mg dose) subcutaneous solution: 0.5 milligram(s) subcutaneous once a week  rosuvastatin 10 mg oral capsule: 1 cap(s) orally once a day (at bedtime)    amLODIPine 10 mg oral tablet: 1 tab(s) orally once a day  aspirin 81 mg oral tablet: 1 tab(s) orally once a day  Coreg 12.5 mg oral tablet: 1 tab(s) orally 2 times a day   doxazosin 8 mg oral tablet: 1 tab(s) orally once a day  Entresto 24 mg-26 mg oral tablet: 1 tab(s) orally 2 times a day     furosemide 20 mg oral tablet: 1 tab(s) orally once a day   hydrALAZINE 50 mg oral tablet: 1 tab(s) orally 3 times a day  Ozempic 2 mg/1.5 mL (0.25 mg or 0.5 mg dose) subcutaneous solution: 0.5 milligram(s) subcutaneous once a week  rosuvastatin 10 mg oral capsule: 1 cap(s) orally once a day (at bedtime)

## 2023-02-28 NOTE — DISCHARGE NOTE PROVIDER - CARE PROVIDER_API CALL
Km Galvan)  Cardiology  [address], [address]  [place] [address]  Phone: [phone]  Fax: [phone]  Follow Up Time:

## 2023-02-28 NOTE — PROVIDER CONTACT NOTE (OTHER) - SITUATION
Pt /92 and asymptomatic
pt refusing FS
Patient states " I take ozempic at home and don't want insulin. my sugar is only high because I had cranberry juice".
Pt /104 with c/o headache

## 2023-02-28 NOTE — DISCHARGE NOTE PROVIDER - HOSPITAL COURSE
51 y/o female with history of uncontrolled HTN, DM, HTN, CVA (in the past no deficits on aspirin) CHF (EF 25%), CKD4 presented to the emergency room complaining of chest pain. Pt reports pressure like substernal chest pain that started the evening prior to admission while lying in bed.     #Acute/Chronic HFrEF  -pt with known prior CHF w mild LV dysfunction  -TTE noted  -orthopnea resolved  -cont low dose lasix PO  -cont BB  -start low dose entresto(due to CKD)  -cath revealed no evidence of CAD     #Chest Pain  -nuclear stress noted  -patchy uptake w severe LV dysfunction  -cath as above   -optimize bp control     #CKD  -renal f/u    Patient seen and evaluated. Reviewed discharge medications with patient and attending. All new medications requiring new prescriptions were sent to the pharmacy of patient's choice. Reviewed need for prescription for previous home medications and new prescriptions sent if requested. Medically cleared/stable for discharge as per Dr. Galvan on 2/28/23 with appropriate follow up. Patient understands and agrees with plan of care.

## 2023-02-28 NOTE — PHARMACOTHERAPY INTERVENTION NOTE - COMMENTS
Discharge medications reviewed with the patient. Current medication schedule was discussed in detail including: medication name, indication, dose, administration times, treatment duration, side effects, drug interactions, and special instructions. Also discussed CHF management, including importance of taking medications every day, fluid and salt restrictions, and maintaining a log of daily weights. Patient verbalized understanding. Questions and concerns were answered and addressed. Patient provided with educational handout and CHF booklet.    New medications: Entresto, carvedilol, furosemide    Ynes Madera, PharmD, BCPS  Clinical Pharmacy Specialist  [id number]

## 2023-02-28 NOTE — PROGRESS NOTE ADULT - ASSESSMENT
51 y/o female with history of uncontrolled HTN, DM, HTN, CVA (in the past no deficits on aspirin) CHF (EF 25%), CKD4 presented to the emergency room complaining of chest pain. Pt reports pressure like substernal chest pain that started the evening prior to admission while lying in bed.     #Acute/Chronic HFrEF  -pt with known prior CHF w mild LV dysfunction  -TTE noted  -orthopnea resolved  -cont low dose lasix 20mg IV daily  -cont BB/hydral  -await cath today, rhc    #Chest Pain  -nuclear stress noted  -patchy uptake w severe LV dysfunction  -given CHF, LV dysfunction, chest pain definitive coronary assessment is warranted  -R/B/A of cath d/w pt she agrees to proceed  -plan for cardiac cath today   -optimize bp control     #CKD  -renal f/u    35 minutes spent on total encounter; more than 50% of the visit was spent counseling and/or coordinating care by the attending physician.      outpt cardiology f/u Dr Oc Ortiz 
51 y/o female with history of uncontrolled HTN, DM, HTN, CVA (in the past no deficits on aspirin) CHF (EF 25%), CKD4 presented to the emergency room complaining of chest pain. Pt reports pressure like substernal chest pain that started the evening prior to admission while lying in bed. admitted to r/o ACS. Renal following for CKD Mx.   	  CKD4, Cr rising  likely 2/2 diabetic nephropathy +/- HTN Nephrosclerosis  Creatinine Trend: 2.23 <-- 2.41 <--, 2.12 <--, 2.07 <--  b/l SCr 1.9 per pt  UA + Protein: 100 mg/dL noted  euvolemic clinically  No evidence of a significant renal artery stenosis on renal doppler in past  K, bicarb acceptable  bp stable    lasix/diuretic per cardiology   monitor BMP daily  low Na diet  dose all meds for eGFR  avoid ACEi/ARB for now/NSAIDs/Nephrotoxics if able    Chronic HFrEF -pt with known prior CMP w mild LV dysfunction  -TTE noted. euvolemic  -cont BB/hydral    Chest Pain- now resolved   - nuclear stress- abnml   -plan for cardiac cath on today per cardiology.  - S/p Mucomyst pre-procedure, pending 1200mg PO q12hrs x2 doses post-procedure    HTN, uncontrolled-bp better today. c/w hydrALAZINE 50mg q8h, Norvasc 10mg po qd -titrate up for optimal bp. low Na diet.  Type 2 diabetes mellitus -Mx per team      For any question, call:  Cell # [phone]  Pager # [phone]  Callback # [phone]
53 y/o female with history of uncontrolled HTN, DM, HTN, CVA (in the past no deficits on aspirin) CHF (EF 25%), CKD4 presented to the emergency room complaining of chest pain. Pt reports pressure like substernal chest pain that started the evening prior to admission while lying in bed.     #Acute/Chronic HFrEF  -pt with known prior CHF w mild LV dysfunction  -TTE noted  -orthopnea resolved  -cont low dose lasix 20mg IV daily  -cont BB/hydral    #Chest Pain  -nuclear stress noted  -patchy uptake w severe LV dysfunction  -given CHF, LV dysfunction, chest pain definitive coronary assessment is warranted  -R/B/A of cath d/w pt she agrees to proceed  -plan for cardiac cath tomorrow    #CKD  -renal eval    35 minutes spent on total encounter; more than 50% of the visit was spent counseling and/or coordinating care by the attending physician.    ACP - Advanced Care Planning    -Advanced care planning discussed with the patient. Advanced care planning forms discussed with the patient and/or family.  Risks, benefits, and alternatives of medical/cardiac procedures were discussed in detail with all questions answered. Up to 30 minutes were spent addressing advanced care planning.  
51 y/o female with history of uncontrolled HTN, DM, HTN, CVA (in the past no deficits on aspirin) CHF (EF 25%), CKD4 presented to the emergency room complaining of chest pain. Pt reports pressure like substernal chest pain that started the evening prior to admission while lying in bed. admitted to r/o ACS. Renal following for CKD Mx.   	  CKD4, Cr rising  likely 2/2 diabetic nephropathy +/- HTN Nephrosclerosis  Creatinine Trend: 2.15 <--  2.23 <-- 2.41 <--, 2.12 <--, 2.07 <--  b/l SCr 1.9 per pt  UA + Protein: 100 mg/dL noted  euvolemic clinically  No evidence of a significant renal artery stenosis on renal doppler in past  K, bicarb acceptable  bp stable  tolerated cardiac cath well, no GLENN    lasix/diuretic per cardiology   monitor BMP daily  low Na diet  dose all meds for eGFR  avoid ACEi/ARB for now/NSAIDs/Nephrotoxics if able    Chronic HFrEF -pt with known prior CMP w mild LV dysfunction  -TTE noted. euvolemic  -cont BB/hydral    Chest Pain- now resolved   - nuclear stress- abnml   - non-diagnostic cardiac cath, no EMMY  - renal function stable    HTN, uncontrolled-bp better today. c/w hydrALAZINE 50mg q8h, Norvasc 10mg po qd -titrate up for optimal bp. low Na diet.  Type 2 diabetes mellitus -Mx per team      For any question, call:  Cell # [phone]  Pager # [phone]  Callback # [phone]
51 y/o female with history of uncontrolled HTN, DM, HTN, CVA (in the past no deficits on aspirin) CHF (EF 25%), CKD4 presented to the emergency room complaining of chest pain. Pt reports pressure like substernal chest pain that started the evening prior to admission while lying in bed. admitted to r/o ACS. Renal following for CKD Mx.   	  CKD4, Cr rising  likely 2/2 diabetic nephropathy +/- HTN Nephrosclerosis  Creatinine Trend: 2.41 <--, 2.12 <--, 2.07 <--  b/l SCr 1.9 per pt  sunrise Cr 10/2022 2>1.82   UA + Protein: 100 mg/dL noted  euvolemic clinically  No evidence of a significant renal artery stenosis on renal doppler in past  K, bicarb acceptable  bp stable    lasix/diuretic per cardiology   monitor BMP daily  low Na diet  dose all meds for eGFR  avoid ACEi/ARB for now/NSAIDs/Nephrotoxics if able    Chronic HFrEF -pt with known prior CMP w mild LV dysfunction  -TTE noted. euvolemic  -cont BB/hydral    Chest Pain- now resolved   - nuclear stress- abnml   -plan for cardiac cath on Monday per cardiology. would recommend to defer it if SCr continues to trend up tomorrow if cath elective.   Intermediate to high risk for GLENN, explained to pt in detail including the possibility of worsening RFT post cath and if no recovery of RF, may need RRT/dialysis. Pt verbalized understanding.   c/w mucomyst 1200mg po q12 x2 doses pre and 2 doses post cath.    HTN, uncontrolled-bp better today. c/w hydrALAZINE 50mg q8h, Norvasc 10mg po qd -titrate up for optimal bp. low Na diet.  Type 2 diabetes mellitus -Mx per team    will closely follow up.   poc d/w pt. will d/w primary team  labs, rad, chart reviewed  For any question, pl call:  Nephrology  Cell -[phone]  Office [phone]  Ans Serv [phone]      
A/P     51 y/o female with history of uncontrolled HTN, DM, HTN, CVA (in the past no deficits on aspirin) CHF (EF 25%), CKD4 presented to the emergency room complaining of chest pain. Pt reports pressure like substernal chest pain that started the evening prior to admission while lying in bed.     #Chest Pain  -nuclear stress done : pos for ischemia   scheduled for cardiac cath in am     #Acute/Chronic HFrEF  Echo done : result noted   -cont lasix 20 mg IV  -cont BB/hydral  -cardio following     #CKD stage 3   -renal following   f/u recommendation     nhan pascual MD    
A/P     53 y/o female with history of uncontrolled HTN, DM, HTN, CVA (in the past no deficits on aspirin) CHF (EF 25%), CKD4 presented to the emergency room complaining of chest pain. Pt reports pressure like substernal chest pain that started the evening prior to admission while lying in bed.     #Chest Pain  -nuclear stress done : pos for ischemia   s/p cardiac cath today   non obstructive coronaries   medical managment     #Acute/Chronic HFrEF  Echo done : result noted   -cont lasix 20 mg IV  -cont BB/hydral  -cardio following     #CKD stage 3   -renal following   f/u recommendation     dispo: s/p cardiac cath , non-obstructive cardiomyopathy . once cleared bt cardio , plan for d/c     
51 y/o female with history of uncontrolled HTN, DM, HTN, CVA (in the past no deficits on aspirin) CHF (EF 25%), CKD4 presented to the emergency room complaining of chest pain. Pt reports pressure like substernal chest pain that started the evening prior to admission while lying in bed.     #Acute/Chronic HFrEF  -pt with known prior CHF w mild LV dysfunction  -TTE noted  -orthopnea resolved  -cont low dose lasix PO  -cont BB  -start low dose entresto(due to CKD)  -cath revealed no evidence of CAD     #Chest Pain  -nuclear stress noted  -patchy uptake w severe LV dysfunction  -cath as above   -optimize bp control     #CKD  -renal f/u    DC home  pt seen by Dr Ortiz as an outpt    35 minutes spent on total encounter; more than 50% of the visit was spent counseling and/or coordinating care by the attending physician.

## 2023-03-01 NOTE — CHART NOTE - NSCHARTNOTEFT_GEN_A_CORE
Post-Discharge Medication Review    Patient was contacted to offer medication counseling post-discharge. Patient declined counseling. She received discharge medication counseling from an inpatient pharmacist prior to discharge.

## 2023-03-23 ENCOUNTER — APPOINTMENT (OUTPATIENT)
Dept: OBGYN | Facility: CLINIC | Age: 53
End: 2023-03-23

## 2023-04-06 ENCOUNTER — APPOINTMENT (OUTPATIENT)
Dept: OBGYN | Facility: CLINIC | Age: 53
End: 2023-04-06
Payer: COMMERCIAL

## 2023-04-06 VITALS
WEIGHT: 204 LBS | DIASTOLIC BLOOD PRESSURE: 104 MMHG | HEIGHT: 64 IN | BODY MASS INDEX: 34.83 KG/M2 | SYSTOLIC BLOOD PRESSURE: 168 MMHG

## 2023-04-06 DIAGNOSIS — Z12.39 ENCOUNTER FOR OTHER SCREENING FOR MALIGNANT NEOPLASM OF BREAST: ICD-10-CM

## 2023-04-06 PROCEDURE — 99396 PREV VISIT EST AGE 40-64: CPT

## 2023-04-10 NOTE — HISTORY OF PRESENT ILLNESS
[Patient reported PAP Smear was normal] : Patient reported PAP Smear was normal [FreeTextEntry1] : 53 yo  presents for annual. S/p URVASHI, BS, right oophorectomy, MAGDA, cysto 10/25/22. She reports hot flashes, for which she is taking primrose oil. Pt has resumed intercourse, no complaints. She started Entresto for heart failure and poor ejection fraction on cardiac stress test. Also reports hair loss.\par \par OB:\par GYN: h/o fibroid\par Medical: CHF, CVA w/ temporary right-sided issues (2019)\par Surgical: URVASHI, BS, right oophorectomy, MAGDA, cysto, repair of cervical laceration, repair of umbilical hernia 10/25/22; CD, abdominal myomectomy\par Family: uterine CA (maternal aunt), breast CA (mother dx 70s), DM (father, sister Type I), HTN (mother), dementia (mother, maternal great aunt x2), father- CHF  80s\par Meds: entresto, furosemide, labetalol 400mg, amlodipine 10mg, rosuvastatin 10mg, ASA 81mcg, folic acid 2 mg, clonidine patch 0.2, Januvia, Humalog, Lantus\par NKDA\par  [PapSmeardate] : 2/21 [ColonoscopyDate] : 10/2022 [TextBox_43] : every 5 yrs

## 2023-04-10 NOTE — END OF VISIT
[FreeTextEntry3] : I, Fredy White, acted as a scribe on behalf of Dr. Soledad Rascon on 04/06/2023 .\par \par All medical entries made by the scribe were at my, Dr. Soledad Rascon's, direction and personally dictated by me on 04/06/2023. I have reviewed the chart and agree that the record accurately reflects my personal performance of the history, physical exam, assessment and plan. I have also personally directed, reviewed, and agreed with the chart.

## 2023-04-10 NOTE — PLAN
[FreeTextEntry1] : 51 yo  for annual. S/p URVASHI, BS, right oophorectomy.\par \par HCM\par -pap done today\par -vit D/exercise/calcium\par -rx breast mammo\par -colonoscopy UTD, due in \par -f/u PCP for annual and appropriate immunizations\par -rto 1 year for annual exam\par \par Abdominal firmness\par -rx abdominal US, poss CT scan

## 2023-04-10 NOTE — PHYSICAL EXAM
[Appropriately responsive] : appropriately responsive [Alert] : alert [No Acute Distress] : no acute distress [Non-distended] : non-distended [Non-tender] : non-tender [No HSM] : No HSM [No Lesions] : no lesions [No Mass] : no mass [Oriented x3] : oriented x3 [Examination Of The Breasts] : a normal appearance [No Masses] : no breast masses were palpable [Labia Majora] : normal [Labia Minora] : normal [Normal] : normal [Absent] : absent [Uterine Adnexae] : normal [FreeTextEntry7] : palpable firmness of anterior abdomen, at right inferior aspect of umbilicus [FreeTextEntry5] : p

## 2023-05-04 LAB — HPV HIGH+LOW RISK DNA PNL CVX: NOT DETECTED

## 2023-05-04 RX ORDER — METRONIDAZOLE 7.5 MG/G
0.75 GEL VAGINAL
Qty: 1 | Refills: 0 | Status: ACTIVE | COMMUNITY
Start: 2022-11-11 | End: 1900-01-01

## 2023-05-09 ENCOUNTER — APPOINTMENT (OUTPATIENT)
Dept: ULTRASOUND IMAGING | Facility: IMAGING CENTER | Age: 53
End: 2023-05-09

## 2023-05-18 DIAGNOSIS — N39.0 URINARY TRACT INFECTION, SITE NOT SPECIFIED: ICD-10-CM

## 2023-05-18 RX ORDER — SULFAMETHOXAZOLE AND TRIMETHOPRIM 800; 160 MG/1; MG/1
800-160 TABLET ORAL TWICE DAILY
Qty: 10 | Refills: 0 | Status: ACTIVE | COMMUNITY
Start: 2023-05-18 | End: 1900-01-01

## 2023-09-29 ENCOUNTER — APPOINTMENT (OUTPATIENT)
Dept: MAMMOGRAPHY | Facility: IMAGING CENTER | Age: 53
End: 2023-09-29

## 2023-09-29 ENCOUNTER — APPOINTMENT (OUTPATIENT)
Dept: ULTRASOUND IMAGING | Facility: IMAGING CENTER | Age: 53
End: 2023-09-29

## 2023-11-22 NOTE — BRIEF OPERATIVE NOTE - NSICDXBRIEFPOSTOP_GEN_ALL_CORE_FT
Patient Quality Outreach    Patient is due for the following:   Colon Cancer Screening  Physical Preventive Adult Physical      Topic Date Due    Hepatitis B Vaccine (1 of 3 - 3-dose series) Never done    Pneumococcal Vaccine (1 - PCV) Never done    Zoster (Shingles) Vaccine (1 of 2) Never done    Diptheria Tetanus Pertussis (DTAP/TDAP/TD) Vaccine (2 - Td or Tdap) 12/06/2023       Next Steps:   Schedule a Adult Preventative    Type of outreach:    Sent [employer] message.      Questions for provider review:    None           Lory Kamara MA        
POST-OP DIAGNOSIS:  Fibroid uterus 25-Oct-2022 14:09:12  Aisha Lazaro  
POST-OP DIAGNOSIS:  Ventral hernia 25-Oct-2022 16:21:19  Mini Helms

## 2024-02-29 NOTE — PROGRESS NOTE ADULT - NSICDXPILOT_GEN_ALL_CORE
Emergency Department Provider Note       PCP: Kris Castro III, MD   Age: 46 y.o.   Sex: female     DISPOSITION Decision To Discharge 02/28/2024 01:41:05 PM       ICD-10-CM    1. Abnormal vaginal bleeding  N93.9 [facility] Torres Phipps MD, OB/GYN, [place]          Medical Decision Making     46-year-old female complaining of vaginal bleeding started yesterday.  She states she has had no menstrual cycle in the last 10 to 12 years and has not sought any medical care for this.  Patient is afebrile vital signs are stable pelvic exam shows small amount of blood labs are normal ultrasound is unremarkable.  Will refer to GYN for further testing and evaluation     1 or more acute illnesses that pose a threat to life or bodily function.   Shared medical decision making was utilized in creating the patients health plan today.    I independently ordered and reviewed each unique test.       I interpreted the Ultrasound  pelvic ultrasound unremarkable.  I interpreted the CBC BMP urinalysis unremarkable pregnancy test negative.              History     Patient to ER complaining of dark vaginal bleeding started yesterday.  Patient states she has not had a menstrual cycle in the past 10 to 12 years.  She has not seen a gynecologist during that time.  She just thought she was going through menopause.  She has had some slight abdominal cramping but no fever no nausea vomiting she is unsure of when her mother went through menopause.    No past medical history on file.     No past surgical history on file.           ROS     Review of Systems   All other systems reviewed and are negative.       Physical Exam     Vitals signs and nursing note reviewed:  Vitals:    02/28/24 0952 02/28/24 1330   BP: 118/77 121/87   Pulse: 74 71   Resp: 15 15   Temp: 98.3 °F (36.8 °C)    TempSrc: Oral    SpO2: 100% 100%   Weight: 63.5 kg (140 lb)    Height: 1.676 m (5' 6\")       Physical Exam  Vitals and nursing note reviewed.   Constitutional:       
[place]

## 2024-04-04 ENCOUNTER — APPOINTMENT (OUTPATIENT)
Dept: OBGYN | Facility: CLINIC | Age: 54
End: 2024-04-04
Payer: COMMERCIAL

## 2024-04-04 VITALS
SYSTOLIC BLOOD PRESSURE: 168 MMHG | DIASTOLIC BLOOD PRESSURE: 99 MMHG | HEIGHT: 64 IN | HEART RATE: 87 BPM | OXYGEN SATURATION: 100 % | WEIGHT: 205 LBS | BODY MASS INDEX: 35 KG/M2

## 2024-04-04 DIAGNOSIS — Z11.51 ENCOUNTER FOR SCREENING FOR HUMAN PAPILLOMAVIRUS (HPV): ICD-10-CM

## 2024-04-04 DIAGNOSIS — Z01.419 ENCOUNTER FOR GYNECOLOGICAL EXAMINATION (GENERAL) (ROUTINE) W/OUT ABNORMAL FINDINGS: ICD-10-CM

## 2024-04-04 DIAGNOSIS — N95.2 POSTMENOPAUSAL ATROPHIC VAGINITIS: ICD-10-CM

## 2024-04-04 DIAGNOSIS — N89.8 OTHER SPECIFIED NONINFLAMMATORY DISORDERS OF VAGINA: ICD-10-CM

## 2024-04-04 PROCEDURE — 99213 OFFICE O/P EST LOW 20 MIN: CPT | Mod: 25

## 2024-04-04 PROCEDURE — 99396 PREV VISIT EST AGE 40-64: CPT

## 2024-04-04 RX ORDER — ESTRADIOL 0.1 MG/G
0.1 CREAM VAGINAL
Qty: 1 | Refills: 2 | Status: ACTIVE | COMMUNITY
Start: 2024-04-04 | End: 1900-01-01

## 2024-04-04 NOTE — REVIEW OF SYSTEMS
[Patient Intake Form Reviewed] : Patient intake form was reviewed [Negative] : Heme/Lymph [FreeTextEntry8] : dryness, pain w/ intercourse

## 2024-04-04 NOTE — PLAN
[FreeTextEntry1] : Routine GYN Exam -Discussed and reviewed importance of monthly BSE -Declines STI testing, importance safe sexual practices discussed -Pap/HPV test collected and sent at todays visit -UTD mammo 3/2024; rpt 1 yr -Osteoporosis prevention; recc. vitd/Calcium supplementation and WBE to maintain bone density; start DEXA >65 -f/u PCP for recommended HCM, vaccinations and CA screening -UTD colonoscopy  Vaginal Dryness -discussed vaginal moisturizers otc and lubricant for intercourse -affirm done today to r/o infection, white d/c on exam -erx estradiol crem 3x week qhs  rto 1 yr or sooner as needed. total encounter time 25 minutes.

## 2024-04-04 NOTE — COUNSELING
[Nutrition/ Exercise/ Weight Management] : nutrition, exercise, weight management [Vitamins/Supplements] : vitamins/supplements [Drugs/Alcohol] : drugs, alcohol [Breast Self Exam] : breast self exam [Bladder Hygiene] : bladder hygiene [Confidentiality] : confidentiality [STD (testing, results, tx)] : STD (testing, results, tx) [Lab Results] : lab results [Medication Management] : medication management

## 2024-04-04 NOTE — HISTORY OF PRESENT ILLNESS
[Patient reported mammogram was normal] : Patient reported mammogram was normal [Patient reported PAP Smear was normal] : Patient reported PAP Smear was normal [FreeTextEntry1] : 54 y/o P1 LMP 10/2022 presents for annual GYN exam. c/o vaginal dryness and discomfort with intercourse. no pelvic pain/PMB/or discarge reported.   GYN: h/o fibroid  Medical: CHF, CVA w/ temporary right-sided issues (2019)  Surgical: URVASHI, BS, right oophorectomy, MAGDA, cysto, repair of cervical laceration, repair of umbilical hernia 10/25/22; CD, abdominal myomectomy  Family: uterine CA (maternal aunt), breast CA (mother dx 70s), DM (father, sister Type I), HTN (mother), dementia (mother, maternal great aunt x2), father- CHF  80s  Meds: furosemide, hydralizine, amlodipine 10mg, Coreg, ASA  NKDA [Mammogramdate] : 3/7/24 [TextBox_19] : BIRADS 1 [PapSmeardate] : 4/6/23 [TextBox_31] : +BV [ColonoscopyDate] : 10/22 [Patient refuses STI testing] : Patient refuses STI testing

## 2024-04-04 NOTE — PHYSICAL EXAM
[Chaperone Declined] : Patient declined chaperone [Appropriately responsive] : appropriately responsive [Alert] : alert [No Acute Distress] : no acute distress [No Lymphadenopathy] : no lymphadenopathy [Soft] : soft [Non-tender] : non-tender [Non-distended] : non-distended [No HSM] : No HSM [No Lesions] : no lesions [No Mass] : no mass [Oriented x3] : oriented x3 [Examination Of The Breasts] : a normal appearance [No Masses] : no breast masses were palpable [Vulvar Atrophy] : vulvar atrophy [Labia Majora] : normal [Labia Minora] : normal [Discharge] : a  ~M vaginal discharge was present [Moderate] : moderate [White] : white [Thin] : thin [No Bleeding] : There was no active vaginal bleeding [Normal] : normal [Absent] : absent [Uterine Adnexae] : normal

## 2024-04-05 DIAGNOSIS — N76.0 ACUTE VAGINITIS: ICD-10-CM

## 2024-04-05 DIAGNOSIS — B96.89 ACUTE VAGINITIS: ICD-10-CM

## 2024-04-05 LAB
CANDIDA VAG CYTO: DETECTED
G VAGINALIS+PREV SP MTYP VAG QL MICRO: DETECTED
T VAGINALIS VAG QL WET PREP: NOT DETECTED

## 2024-04-05 RX ORDER — FLUCONAZOLE 150 MG/1
150 TABLET ORAL
Qty: 2 | Refills: 0 | Status: ACTIVE | COMMUNITY
Start: 2024-04-05 | End: 1900-01-01

## 2024-04-05 RX ORDER — METRONIDAZOLE 7.5 MG/G
0.75 GEL VAGINAL
Qty: 1 | Refills: 0 | Status: ACTIVE | COMMUNITY
Start: 2024-04-05 | End: 1900-01-01

## 2024-04-08 LAB
CYTOLOGY CVX/VAG DOC THIN PREP: ABNORMAL
HPV HIGH+LOW RISK DNA PNL CVX: NOT DETECTED

## 2024-08-27 NOTE — H&P PST ADULT - CONSTITUTIONAL
"Complaints today: Ms. Orta reports that she is doing well with no complaints. Normal fetal movements with no vaginal bleeding, LOF or contractions at this time.     /68   Wt 86.6 kg (191 lb)   LMP 2024 (Exact Date)   BMI 32.79 kg/m²     36 y.o., at 31w1d by Estimated Date of Delivery: 10/28/24  Patient Active Problem List   Diagnosis    Non-seasonal allergic rhinitis due to fungal spores    Supervision of normal first pregnancy, antepartum    Advanced maternal age, primigravida, antepartum    Decreased strength, endurance, and mobility    Decreased activity tolerance     OB History    Para Term  AB Living   1 0 0 0 0 0   SAB IAB Ectopic Multiple Live Births   0 0 0 0 0      # Outcome Date GA Lbr Last/2nd Weight Sex Type Anes PTL Lv   1 Current                Dating reviewed    Allergies and problem list reviewed and updated    Medical and surgical history reviewed    Prenatal labs reviewed and updated    Physical Exam:  ABD: soft, gravid, nontender,     Assessment:  Sofia Bojorquez" was seen today for routine prenatal visit.    Diagnoses and all orders for this visit:    Supervision of normal first pregnancy, antepartum  - Doing well  - TDAP done today    Advanced maternal age, primigravida, antepartum        No orders of the defined types were placed in this encounter.      Follow up in about 2 weeks (around 9/10/2024) for Routine OB.        " detailed exam

## 2024-11-27 ENCOUNTER — NON-APPOINTMENT (OUTPATIENT)
Age: 54
End: 2024-11-27

## 2024-12-13 NOTE — CONSULT NOTE ADULT - ASSESSMENT
Problem: Adult Inpatient Plan of Care  Goal: Plan of Care Review  Outcome: Progressing  Flowsheets (Taken 12/12/2024 2010)  Plan of Care Reviewed With: patient  Goal: Patient-Specific Goal (Individualized)  Outcome: Progressing     Problem: Diabetes Comorbidity  Goal: Blood Glucose Level Within Targeted Range  Outcome: Progressing  Intervention: Monitor and Manage Glycemia  Flowsheets (Taken 12/12/2024 2330)  Glycemic Management: blood glucose monitored     Problem: Fall Injury Risk  Goal: Absence of Fall and Fall-Related Injury  Outcome: Progressing  Intervention: Identify and Manage Contributors  Flowsheets (Taken 12/12/2024 2330)  Self-Care Promotion:   independence encouraged   BADL personal objects within reach  Medication Review/Management: medications reviewed  Intervention: Promote Injury-Free Environment  Flowsheets (Taken 12/12/2024 2330)  Safety Promotion/Fall Prevention:   assistive device/personal item within reach   bed alarm set   nonskid shoes/socks when out of bed   Roll belt self-releasing   side rails raised x 3   toileting scheduled     
51 y/o female with history of uncontrolled HTN, DM, HTN, CVA (in the past no deficits on aspirin) CHF (EF 25%), CKD4 presented to the emergency room complaining of chest pain. Pt reports pressure like substernal chest pain that started the evening prior to admission while lying in bed.     #Acute/Chronic HFrEF  -pt with known prior CHF w mild LV dysfunction  -TTE noted  -c/o orthopnea  -start low dose lasix 20mg IV daily  -cont BB/hydral    #Chest Pain  -nuclear stress noted  -patchy uptake more c/w possible NICM  -however given CHF, LV dysfunction, chest pain definitive coronary assessment is warranted  -plan for cardiac cath on Monday    #CKD  -renal eval    35 minutes spent on total encounter; more than 50% of the visit was spent counseling and/or coordinating care by the attending physician.    ACP - Advanced Care Planning    -Advanced care planning discussed with the patient. Advanced care planning forms discussed with the patient and/or family.  Risks, benefits, and alternatives of medical/cardiac procedures were discussed in detail with all questions answered. Up to 30 minutes were spent addressing advanced care planning.  
51 y/o female with history of uncontrolled HTN, DM, HTN, CVA (in the past no deficits on aspirin) CHF (EF 25%), CKD4 presented to the emergency room complaining of chest pain. Pt reports pressure like substernal chest pain that started the evening prior to admission while lying in bed. admitted to r/o ACS. Renal consulted for CKD Mx.   	  CKD4, stable  likely 2/2 diabetic nephropathy +/- HTN Nephrosclerosis  Creatinine Trend: 2.12 <--, 2.07 <--  b/l SCr 1.9 per pt  sunrise Cr 10/2022 2>1.82   UA + Protein: 100 mg/dL noted  euvolemic clinically  No evidence of a significant renal artery stenosis on renal doppler in past    lasix/diuretic per cardiology   monitor BMP daily  low Na diet  dose all meds for eGFR  avoid ACEi/ARB for now/NSAIDs/Nephrotoxics if able    Chronic HFrEF  -pt with known prior CMP w mild LV dysfunction  -TTE noted  - euvolemic  -cont BB/hydral    Chest Pain- now resolved  -s/p nuclear stress  -plan for cardiac cath on Monday per cardiology  Intermediate risk for GLENN, explained to pt in detail including the possibility of worsening RFT post cath and if no recovery of RF, may need RRT/dialysis. Pt verbalized understanding.   recommend prophylaxis w/mucomyst 1200mg po q12 x2 doses pre and 2 doses post cath.    HTN, uncontrolled-bp high. c/w hydrALAZINE, Norvasc-titrate up for optimal bp. low Na diet.  Type 2 diabetes mellitus -Mx per team    Thanks for consulting. will closely follow up.   poc d/w pt  labs, rad, chart reviewed  For any question, pl call:  Nephrology  Cell -[phone]  Office [phone]  Ans Serv [phone]

## 2025-03-25 NOTE — ED ADULT TRIAGE NOTE - SPO2 (%)
[facility] [place] requesting methylphenidate 54mg #30 and 10mg #30. Cpx scheduled 05/2025 and UDS on 04/2024. Please send if ok.   
[facility] database reviewed prior to refilling medication. Refill sent, methylphenidate 5mg and 30mg #30 each.  Bridget Wallace MD     
100

## 2025-07-02 ENCOUNTER — APPOINTMENT (OUTPATIENT)
Dept: OBGYN | Facility: CLINIC | Age: 55
End: 2025-07-02
Payer: COMMERCIAL

## 2025-07-02 VITALS
BODY MASS INDEX: 37.39 KG/M2 | DIASTOLIC BLOOD PRESSURE: 88 MMHG | SYSTOLIC BLOOD PRESSURE: 151 MMHG | HEIGHT: 64 IN | WEIGHT: 219 LBS

## 2025-07-02 PROCEDURE — 99396 PREV VISIT EST AGE 40-64: CPT

## 2025-07-10 LAB
CYTOLOGY CVX/VAG DOC THIN PREP: NORMAL
HPV HIGH+LOW RISK DNA PNL CVX: NOT DETECTED

## (undated) DEVICE — PACK GYN LAPAROSCOPY

## (undated) DEVICE — NDL HYPO REGULAR BEVEL 25G X 1.5" (BLUE)

## (undated) DEVICE — FOLEY TRAY 16FR 5CC LTX UMETER CLOSED

## (undated) DEVICE — RUMI TIP BLUE 6.7MM X 8CM

## (undated) DEVICE — UTERINE MANIPULATOR CONMED VCARE MED 34MM

## (undated) DEVICE — FOLEY HOLDER STATLOCK 2 WAY ADULT

## (undated) DEVICE — TROCAR COVIDIEN VERSASTEP PLUS 15MM STANDARD

## (undated) DEVICE — LIGASURE IMPACT

## (undated) DEVICE — TROCAR GELPOINT MINI ADVANCED

## (undated) DEVICE — SUT POLYSORB 0 30" GS-23

## (undated) DEVICE — LIGASURE BLUNT TIP 37CM

## (undated) DEVICE — TUBING STRYKEFLOW II SUCTION / IRRIGATOR

## (undated) DEVICE — TUBING RANGER FLUID IRRIGATION SET DISP

## (undated) DEVICE — TROCAR COVIDIEN BLUNT TIP HASSAN 10MM

## (undated) DEVICE — GLV 7.5 PROTEXIS (WHITE)

## (undated) DEVICE — ELCTR PKS PLASMA SPATULA 5MM 33CM

## (undated) DEVICE — NDL COUNTER FOAM AND MAGNET 40-70

## (undated) DEVICE — D HELP - CLEARVIEW CLEARIFY SYSTEM

## (undated) DEVICE — TROCAR APPLIED MEDICAL KII BALLOON BLUNT TIP 12MM X 100MM

## (undated) DEVICE — OLYMPUS PK SPATULA 5MM

## (undated) DEVICE — DRSG KLING 4"

## (undated) DEVICE — APPLICATOR FOR ARISTA XL 38CM

## (undated) DEVICE — UTERINE MANIPULATOR CONMED VCARE LG 37MM

## (undated) DEVICE — BLADE SCALPEL SAFETYLOCK #15

## (undated) DEVICE — DRAPE MAYO STAND 30"

## (undated) DEVICE — GOWN TRIMAX LG

## (undated) DEVICE — DRAPE EQUIPMENT BANDED BAG 30 X 30" (SHOWER CAP)

## (undated) DEVICE — MEDICATION LABELS W MARKER

## (undated) DEVICE — LAP PAD 18 X 18"

## (undated) DEVICE — VENODYNE/SCD SLEEVE CALF LARGE

## (undated) DEVICE — FOLEY CATH 2-WAY 16FR 5CC LATEX LUBRICATH

## (undated) DEVICE — LONE STAR ELASTIC STAY HOOK 5MM SHARP

## (undated) DEVICE — DRAPE 1/2 SHEET 40X57"

## (undated) DEVICE — VISITEC 4X4

## (undated) DEVICE — MARKING PEN W RULER

## (undated) DEVICE — SYR LUER LOK 10CC

## (undated) DEVICE — LONE STAR RETRACTOR SQUARE 14.1CMX14.1CM DISP

## (undated) DEVICE — PREP BETADINE SPONGE STICKS

## (undated) DEVICE — DRAPE INSTRUMENT POUCH 6.75" X 11"

## (undated) DEVICE — TROCAR COVIDIEN VERSAPORT BLADELESS OPTICAL 5MM STANDARD

## (undated) DEVICE — WARMING BLANKET UPPER ADULT

## (undated) DEVICE — SOL IRR POUR H2O 250ML

## (undated) DEVICE — STAPLER COVIDIEN ENDO GIA SHORT HANDLE

## (undated) DEVICE — FOLEY TRAY 16FR LF URINE METER SURESTEP

## (undated) DEVICE — INSUFFLATION NDL COVIDIEN STEP 14G FOR STEP/VERSASTEP

## (undated) DEVICE — SOL IRR BAG H2O 3000ML

## (undated) DEVICE — GLV 6.5 PROTEXIS (WHITE)

## (undated) DEVICE — UTERINE MANIPULATOR COOPER SURGICAL 5MM 33CM GREEN

## (undated) DEVICE — VALVE YELLOW PORT SEAL PLUS 5MM

## (undated) DEVICE — TUBING SUCTION 20FT

## (undated) DEVICE — UTERINE MANIPULATOR CONMED VCARE SM 32MM

## (undated) DEVICE — UTERINE MANIPULATOR CLINICAL INNOVATIONS CLEARVIEW 7CM

## (undated) DEVICE — TUBING SMOKE EVACUATOR

## (undated) DEVICE — ELCTR BOVIE PENCIL SMOKE EVACUATION

## (undated) DEVICE — SUT VLOC 180 0 12" GS-21 GREEN

## (undated) DEVICE — VAGINAL PACKING 2"

## (undated) DEVICE — PACK MAJOR ABDOMINAL SUPINE

## (undated) DEVICE — GLV 7 PROTEXIS (WHITE)

## (undated) DEVICE — PREP CHLOROHEXIDINE 4% 118CC KIT

## (undated) DEVICE — SUT BIOSYN 4-0 18" P-12

## (undated) DEVICE — DRAPE LIGHT HANDLE COVER (BLUE)

## (undated) DEVICE — PREP BETADINE KIT

## (undated) DEVICE — SYR ASEPTO

## (undated) DEVICE — GLV 8 PROTEXIS (WHITE)

## (undated) DEVICE — DRAPE TOWEL BLUE 17" X 24"

## (undated) DEVICE — NDL HYPO REGULAR BEVEL 22G X 1.5" (TURQUOISE)

## (undated) DEVICE — SUT VLOC 90 2-0 9" GS-22 UNDYED

## (undated) DEVICE — TUBING TUR 2 PRONG

## (undated) DEVICE — SPECIMEN CONTAINER 100ML

## (undated) DEVICE — SOL IRR BAG NS 0.9% 3000ML

## (undated) DEVICE — ENDOCATCH 10MM SPECIMEN POUCH

## (undated) DEVICE — DRAPE 3/4 SHEET W REINFORCEMENT 56X77"

## (undated) DEVICE — STAPLER SKIN VISI-STAT 35 WIDE

## (undated) DEVICE — SUT POLYSORB 2-0 30" GS-23

## (undated) DEVICE — TUBING INSUFFLATION LAP FILTER 10FT

## (undated) DEVICE — DRSG TEGADERM 6"X8"

## (undated) DEVICE — PREP CHLORAPREP HI-LITE ORANGE 26ML

## (undated) DEVICE — APPLICATOR VISTASEAL LAP DUAL 35CM RIGID

## (undated) DEVICE — POSITIONER FOAM EGG CRATE ULNAR 2PCS (PINK)

## (undated) DEVICE — TROCAR COVIDIEN VERSASTEP PLUS 11MM STANDARD

## (undated) DEVICE — DRSG STERISTRIPS 0.5 X 4"

## (undated) DEVICE — FOLEY CATH 2-WAY 18FR 5CC LATEX HYDROGEL

## (undated) DEVICE — SOL IRR POUR NS 0.9% 500ML

## (undated) DEVICE — PACK CYSTO

## (undated) DEVICE — BLADE SCALPEL SAFETYLOCK #10

## (undated) DEVICE — ABDOMINAL BINDER XL 9" X 62"-74"

## (undated) DEVICE — ABDOMINAL BINDER MED/LG 12" X 45"-62"

## (undated) DEVICE — TROCAR COVIDIEN VERSASTEP PLUS 12MM STANDARD

## (undated) DEVICE — GLV 8.5 PROTEXIS (WHITE)

## (undated) DEVICE — ENDOCATCH II 15MM

## (undated) DEVICE — DRSG CURITY GAUZE SPONGE 4 X 4" 12-PLY

## (undated) DEVICE — SUCTION YANKAUER NO CONTROL VENT